# Patient Record
Sex: FEMALE | Race: WHITE | NOT HISPANIC OR LATINO | Employment: OTHER | URBAN - NONMETROPOLITAN AREA
[De-identification: names, ages, dates, MRNs, and addresses within clinical notes are randomized per-mention and may not be internally consistent; named-entity substitution may affect disease eponyms.]

---

## 2023-01-25 PROBLEM — R00.2 HEART PALPITATIONS: Status: ACTIVE | Noted: 2023-01-25

## 2023-01-25 PROBLEM — H25.13 AGE-RELATED NUCLEAR CATARACT, BILATERAL: Status: ACTIVE | Noted: 2023-01-25

## 2023-01-25 PROBLEM — N18.32 STAGE 3B CHRONIC KIDNEY DISEASE (MULTI): Status: ACTIVE | Noted: 2023-01-25

## 2023-01-25 PROBLEM — H90.3 ASYMMETRIC SNHL (SENSORINEURAL HEARING LOSS): Status: ACTIVE | Noted: 2023-01-25

## 2023-01-25 PROBLEM — M54.2 NECK PAIN: Status: ACTIVE | Noted: 2023-01-25

## 2023-01-25 PROBLEM — E27.9 ADRENAL NODULE: Status: ACTIVE | Noted: 2023-01-25

## 2023-01-25 PROBLEM — K92.2 GI BLEEDING: Status: ACTIVE | Noted: 2023-01-25

## 2023-01-25 PROBLEM — I25.84 CORONARY ARTERY CALCIFICATION: Status: ACTIVE | Noted: 2023-01-25

## 2023-01-25 PROBLEM — R06.02 EXERTIONAL SHORTNESS OF BREATH: Status: ACTIVE | Noted: 2023-01-25

## 2023-01-25 PROBLEM — T78.40XA ALLERGIC REACTION: Status: ACTIVE | Noted: 2023-01-25

## 2023-01-25 PROBLEM — M25.522 LEFT ELBOW PAIN: Status: ACTIVE | Noted: 2023-01-25

## 2023-01-25 PROBLEM — M81.0 OSTEOPOROSIS: Status: ACTIVE | Noted: 2023-01-25

## 2023-01-25 PROBLEM — R26.9 GAIT ABNORMALITY: Status: ACTIVE | Noted: 2023-01-25

## 2023-01-25 PROBLEM — G47.33 OBSTRUCTIVE SLEEP APNEA: Status: ACTIVE | Noted: 2023-01-25

## 2023-01-25 PROBLEM — G25.81 RESTLESS LEGS SYNDROME: Status: ACTIVE | Noted: 2023-01-25

## 2023-01-25 PROBLEM — I27.20 PULMONARY HYPERTENSION, MILD (MULTI): Status: ACTIVE | Noted: 2023-01-25

## 2023-01-25 PROBLEM — G89.29 CHRONIC LEFT-SIDED LOW BACK PAIN WITH LEFT-SIDED SCIATICA: Status: ACTIVE | Noted: 2023-01-25

## 2023-01-25 PROBLEM — H43.399 VITREOUS FLOATERS: Status: ACTIVE | Noted: 2023-01-25

## 2023-01-25 PROBLEM — I10 BENIGN ESSENTIAL HYPERTENSION: Status: ACTIVE | Noted: 2023-01-25

## 2023-01-25 PROBLEM — J45.20 MILD INTERMITTENT ASTHMA WITHOUT COMPLICATION (HHS-HCC): Status: ACTIVE | Noted: 2023-01-25

## 2023-01-25 PROBLEM — I26.99 PULMONARY EMBOLISM (MULTI): Status: ACTIVE | Noted: 2023-01-25

## 2023-01-25 PROBLEM — E55.9 VITAMIN D DEFICIENCY: Status: ACTIVE | Noted: 2023-01-25

## 2023-01-25 PROBLEM — E78.5 DYSLIPIDEMIA: Status: ACTIVE | Noted: 2023-01-25

## 2023-01-25 PROBLEM — I25.10 CORONARY ARTERY CALCIFICATION: Status: ACTIVE | Noted: 2023-01-25
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
## 2023-01-25 PROBLEM — R31.0 GROSS HEMATURIA: Status: ACTIVE | Noted: 2023-01-25

## 2023-01-25 PROBLEM — E66.812 CLASS 2 SEVERE OBESITY DUE TO EXCESS CALORIES WITH SERIOUS COMORBIDITY AND BODY MASS INDEX (BMI) OF 39.0 TO 39.9 IN ADULT: Status: ACTIVE | Noted: 2023-01-25

## 2023-01-25 PROBLEM — E66.01 CLASS 2 SEVERE OBESITY DUE TO EXCESS CALORIES WITH SERIOUS COMORBIDITY AND BODY MASS INDEX (BMI) OF 39.0 TO 39.9 IN ADULT (MULTI): Status: ACTIVE | Noted: 2023-01-25

## 2023-01-25 PROBLEM — H91.10 PRESBYACUSIS: Status: ACTIVE | Noted: 2023-01-25

## 2023-01-25 PROBLEM — R53.82 CHRONIC FATIGUE: Status: ACTIVE | Noted: 2023-01-25

## 2023-01-25 PROBLEM — N39.3 STRESS INCONTINENCE IN FEMALE: Status: ACTIVE | Noted: 2023-01-25

## 2023-01-25 PROBLEM — M54.50 ACUTE LOW BACK PAIN: Status: ACTIVE | Noted: 2023-01-25

## 2023-01-25 PROBLEM — H93.13 TINNITUS OF BOTH EARS: Status: ACTIVE | Noted: 2023-01-25

## 2023-01-25 PROBLEM — D12.6 TUBULAR ADENOMA OF COLON: Status: ACTIVE | Noted: 2023-01-25

## 2023-01-25 PROBLEM — F41.8 DEPRESSION WITH ANXIETY: Status: ACTIVE | Noted: 2023-01-25

## 2023-01-25 PROBLEM — B07.0 PLANTAR WARTS: Status: ACTIVE | Noted: 2023-01-25

## 2023-01-25 PROBLEM — L65.9 HAIR LOSS: Status: ACTIVE | Noted: 2023-01-25

## 2023-01-25 PROBLEM — H43.813 POSTERIOR VITREOUS DETACHMENT OF BOTH EYES: Status: ACTIVE | Noted: 2023-01-25

## 2023-01-25 PROBLEM — M54.42 CHRONIC LEFT-SIDED LOW BACK PAIN WITH LEFT-SIDED SCIATICA: Status: ACTIVE | Noted: 2023-01-25

## 2023-01-25 PROBLEM — I20.9 ANGINA PECTORIS (CMS-HCC): Status: ACTIVE | Noted: 2023-01-25

## 2023-01-25 PROBLEM — D64.9 ANEMIA: Status: ACTIVE | Noted: 2023-01-25

## 2023-01-25 PROBLEM — R94.120 ABNORMALLY COMPLIANT LEFT MIDDLE EAR SYSTEM WITH TYPE AD TYMPANOGRAM CURVE: Status: ACTIVE | Noted: 2023-01-25

## 2023-01-25 PROBLEM — I82.409 DVT (DEEP VENOUS THROMBOSIS) (MULTI): Status: ACTIVE | Noted: 2023-01-25

## 2023-01-25 PROBLEM — N28.1 RENAL CYST: Status: ACTIVE | Noted: 2023-01-25

## 2023-01-25 PROBLEM — H04.123 DRY EYES: Status: ACTIVE | Noted: 2023-01-25

## 2023-01-25 PROBLEM — R94.31 ABNORMAL EKG: Status: ACTIVE | Noted: 2023-01-25

## 2023-01-25 PROBLEM — R13.10 DYSPHAGIA: Status: ACTIVE | Noted: 2023-01-25

## 2023-01-25 PROBLEM — F41.9 ANXIETY: Status: ACTIVE | Noted: 2023-01-25

## 2023-01-25 PROBLEM — H91.90 DECREASED HEARING: Status: ACTIVE | Noted: 2023-01-25

## 2023-01-25 PROBLEM — R93.1 AGATSTON CORONARY ARTERY CALCIUM SCORE LESS THAN 100: Status: ACTIVE | Noted: 2023-01-25

## 2023-01-25 PROBLEM — M25.512 ACUTE PAIN OF LEFT SHOULDER: Status: ACTIVE | Noted: 2023-01-25

## 2023-01-25 PROBLEM — I25.10 ATHSCL HEART DISEASE OF NATIVE CORONARY ARTERY W/O ANG PCTRS: Status: ACTIVE | Noted: 2023-01-25

## 2023-01-25 PROBLEM — E27.8 ADRENAL NODULE (MULTI): Status: ACTIVE | Noted: 2023-01-25

## 2023-01-25 RX ORDER — LISINOPRIL 10 MG/1
1 TABLET ORAL DAILY
COMMUNITY
Start: 2015-09-01 | End: 2023-04-17

## 2023-01-25 RX ORDER — BISOPROLOL FUMARATE 5 MG/1
2.5 TABLET, FILM COATED ORAL DAILY
COMMUNITY
Start: 2018-11-26 | End: 2023-09-13 | Stop reason: SDUPTHER

## 2023-01-25 RX ORDER — ATORVASTATIN CALCIUM 40 MG/1
1 TABLET, FILM COATED ORAL DAILY
COMMUNITY
Start: 2016-06-13 | End: 2023-06-05 | Stop reason: SDUPTHER

## 2023-01-25 RX ORDER — NITROGLYCERIN 0.4 MG/1
TABLET SUBLINGUAL
COMMUNITY
Start: 2018-11-26

## 2023-01-25 RX ORDER — DULOXETIN HYDROCHLORIDE 30 MG/1
1 CAPSULE, DELAYED RELEASE ORAL DAILY
COMMUNITY
Start: 2018-07-26 | End: 2023-04-17

## 2023-01-25 RX ORDER — ISOSORBIDE MONONITRATE 30 MG/1
1 TABLET, EXTENDED RELEASE ORAL DAILY
COMMUNITY
Start: 2018-11-26 | End: 2023-09-13 | Stop reason: SDUPTHER

## 2023-03-09 ENCOUNTER — OFFICE VISIT (OUTPATIENT)
Dept: PRIMARY CARE | Facility: CLINIC | Age: 73
End: 2023-03-09
Payer: MEDICARE

## 2023-03-09 VITALS
SYSTOLIC BLOOD PRESSURE: 134 MMHG | HEART RATE: 86 BPM | RESPIRATION RATE: 22 BRPM | BODY MASS INDEX: 39.79 KG/M2 | OXYGEN SATURATION: 98 % | WEIGHT: 216.2 LBS | HEIGHT: 62 IN | DIASTOLIC BLOOD PRESSURE: 78 MMHG

## 2023-03-09 DIAGNOSIS — I25.84 CORONARY ARTERY CALCIFICATION: ICD-10-CM

## 2023-03-09 DIAGNOSIS — L65.9 HAIR LOSS: ICD-10-CM

## 2023-03-09 DIAGNOSIS — I20.9 ANGINA PECTORIS (CMS-HCC): ICD-10-CM

## 2023-03-09 DIAGNOSIS — E27.8 ADRENAL NODULE (MULTI): ICD-10-CM

## 2023-03-09 DIAGNOSIS — N18.32 STAGE 3B CHRONIC KIDNEY DISEASE (MULTI): ICD-10-CM

## 2023-03-09 DIAGNOSIS — I25.10 CORONARY ARTERY CALCIFICATION: ICD-10-CM

## 2023-03-09 DIAGNOSIS — E66.01 CLASS 2 SEVERE OBESITY DUE TO EXCESS CALORIES WITH SERIOUS COMORBIDITY AND BODY MASS INDEX (BMI) OF 39.0 TO 39.9 IN ADULT (MULTI): ICD-10-CM

## 2023-03-09 DIAGNOSIS — I26.93 SINGLE SUBSEGMENTAL PULMONARY EMBOLISM WITHOUT ACUTE COR PULMONALE (MULTI): Primary | ICD-10-CM

## 2023-03-09 DIAGNOSIS — H93.13 TINNITUS OF BOTH EARS: ICD-10-CM

## 2023-03-09 DIAGNOSIS — F41.9 ANXIETY: ICD-10-CM

## 2023-03-09 DIAGNOSIS — R53.83 OTHER FATIGUE: ICD-10-CM

## 2023-03-09 DIAGNOSIS — I25.10 ATHSCL HEART DISEASE OF NATIVE CORONARY ARTERY W/O ANG PCTRS: ICD-10-CM

## 2023-03-09 DIAGNOSIS — E55.9 VITAMIN D DEFICIENCY: ICD-10-CM

## 2023-03-09 DIAGNOSIS — E78.5 DYSLIPIDEMIA: ICD-10-CM

## 2023-03-09 DIAGNOSIS — I10 BENIGN ESSENTIAL HYPERTENSION: ICD-10-CM

## 2023-03-09 DIAGNOSIS — M81.0 AGE-RELATED OSTEOPOROSIS WITHOUT CURRENT PATHOLOGICAL FRACTURE: ICD-10-CM

## 2023-03-09 DIAGNOSIS — K58.8 OTHER IRRITABLE BOWEL SYNDROME: ICD-10-CM

## 2023-03-09 PROBLEM — I27.20 PULMONARY HYPERTENSION, MILD (MULTI): Status: RESOLVED | Noted: 2023-01-25 | Resolved: 2023-03-09

## 2023-03-09 PROCEDURE — 1160F RVW MEDS BY RX/DR IN RCRD: CPT | Performed by: FAMILY MEDICINE

## 2023-03-09 PROCEDURE — 3075F SYST BP GE 130 - 139MM HG: CPT | Performed by: FAMILY MEDICINE

## 2023-03-09 PROCEDURE — 3078F DIAST BP <80 MM HG: CPT | Performed by: FAMILY MEDICINE

## 2023-03-09 PROCEDURE — 3008F BODY MASS INDEX DOCD: CPT | Performed by: FAMILY MEDICINE

## 2023-03-09 PROCEDURE — 1159F MED LIST DOCD IN RCRD: CPT | Performed by: FAMILY MEDICINE

## 2023-03-09 PROCEDURE — 1036F TOBACCO NON-USER: CPT | Performed by: FAMILY MEDICINE

## 2023-03-09 PROCEDURE — 99214 OFFICE O/P EST MOD 30 MIN: CPT | Performed by: FAMILY MEDICINE

## 2023-03-09 RX ORDER — CALCIUM CARBONATE/VITAMIN D3 250-3.125
1 TABLET ORAL
COMMUNITY
End: 2023-09-13 | Stop reason: SDUPTHER

## 2023-03-09 RX ORDER — SEMAGLUTIDE 1.34 MG/ML
0.25 INJECTION, SOLUTION SUBCUTANEOUS
Qty: 1 EACH | Refills: 0 | Status: SHIPPED | OUTPATIENT
Start: 2023-03-09 | End: 2023-03-09

## 2023-03-09 RX ORDER — SEMAGLUTIDE 1.34 MG/ML
0.25 INJECTION, SOLUTION SUBCUTANEOUS
Qty: 1 EACH | Refills: 0 | Status: SHIPPED | OUTPATIENT
Start: 2023-03-09 | End: 2023-04-27

## 2023-03-09 ASSESSMENT — PATIENT HEALTH QUESTIONNAIRE - PHQ9
SUM OF ALL RESPONSES TO PHQ9 QUESTIONS 1 AND 2: 0
2. FEELING DOWN, DEPRESSED OR HOPELESS: NOT AT ALL
1. LITTLE INTEREST OR PLEASURE IN DOING THINGS: NOT AT ALL

## 2023-03-09 ASSESSMENT — ENCOUNTER SYMPTOMS
OCCASIONAL FEELINGS OF UNSTEADINESS: 0
DEPRESSION: 0
LOSS OF SENSATION IN FEET: 0

## 2023-03-09 ASSESSMENT — COLUMBIA-SUICIDE SEVERITY RATING SCALE - C-SSRS
2. HAVE YOU ACTUALLY HAD ANY THOUGHTS OF KILLING YOURSELF?: NO
6. HAVE YOU EVER DONE ANYTHING, STARTED TO DO ANYTHING, OR PREPARED TO DO ANYTHING TO END YOUR LIFE?: NO

## 2023-03-09 ASSESSMENT — PAIN SCALES - GENERAL: PAINLEVEL: 0-NO PAIN

## 2023-03-09 NOTE — PROGRESS NOTES
Subjective     Soni Cleveland is a 72 y.o. female who presents for Follow-up (3 month follow up meds).      HPI  The patient is a 72 year-old female presenting to the clinic for a 3 month follow up .  Has history of pulmonary embolism.  Has been on Eliquis.  Educated on dyslipidemia and diet and exercise.  Educated on hypertension low-salt and exercise.  Complaining hair loss.  Advised to keep appointment with the dermatologist.  Has been to the dermatologist and stated that her loss is improving.  History of anxiety.  Denies depression.  Denies suicidal.  Denies homicidal.  Diet exercise.  Advised to lose weight.  Educated on coronary artery disease.  Advised to call 911 if develops chest pain and/or heart palpitations and/or shortness of breath.  History of adrenal nodule.  Advised to keep appointment with specialist.  And exercise.  Continue current management of angina.  No chest pain.  Feeling tired.  Advised on diet and exercise.  Educated on vitamin D deficiency.  Educated on irritable bowel syndrome.  Advised to increase dietary fiber intake.  Complaining of ringing in both ears.  Recommended referral to the ENT.  Review of Systems     General.  Denies fatigue.  Denies fever.  Denies weight loss.  Denies weight gain.    HEENT dictated as above respiratory.  Denies cough.  Denies shortness of breath.    Cardiovascular.  Denies chest pain.  Denies heart palpitations.  Denies shortness of breath.    Gastrointestinal.  Denies nausea vomiting diarrhea.  Denies abdominal pain.    Genitourinary denies burning urination.  Denies frequent urination.  Denies flank pain.  Denies blood in the urine.  Denies abnormal vaginal discharge.    Neurology.  Denies tingling numbness but denies weakness.  Denies headache.  Denies blurred vision.    Musculoskeletal.  Denies body aches.  Denies joint pains.  Denies muscle aches.  Denies muscle weakness    Endocrinology.  Denies cold intolerance.  Denies hot  "intolerance.      Objective   /78 (BP Location: Left arm, Patient Position: Sitting, BP Cuff Size: Large adult)   Pulse 86   Resp 22   Ht 1.575 m (5' 2\")   Wt 98.1 kg (216 lb 3.2 oz)   SpO2 98%   BMI 39.54 kg/m²      Physical Exam  General.  Not in distress.  HEENT normocephalic anicteric sclerae.  Neck soft supple no thyromegaly.  No carotid bruit.  Lungs are clear.  Heart regular.  Abdomen soft nontender nondistended bowel sounds are positive.  Extremities no clubbing cyanosis or edema.  Psychiatric.  Has good eye contact.  No crying spells noted.  Speech was normal.  Denies depression.  Denies suicidal.  Denies homicidal.     Assessment/Plan     1.  Pulm embolism.  Dictated as above.    2.  Dyslipidemia.  Educated on diet exercise.  Advised to lose weight.    3.  Hair loss.  Dictated as above    4.  Anxiety.  Counseled for anxiety.  Patient.  Denies suicidal.  Denies homicidal.  Continue current management.  5.  Obesity.  Educated on diet and exercise.  Explained adverse effects of medication.  We will continue monitor weight loss  6.  Hypertension.  Dictated as above.    7.  Coronary artery disease.  Dictated as above.    8.  Chronic kidney disease.  Dictated as above.    9.  Osteoporosis.  Dictated as above.    10.  Fatigue.  Dictated as above.    11.  Vitamin D deficiency.  Dictated as above.    12.  Irritable bowel syndrome.  Dictated as above.    13.  Tinnitus of both ears.  Dictated as above                              Problem List Items Addressed This Visit          Circulatory    Angina pectoris (CMS/HCC)    Athscl heart disease of native coronary artery w/o ang pctrs    Benign essential hypertension    Relevant Orders    Comprehensive metabolic panel    Urinalysis with Reflex Microscopic    Albumin , Urine Random    Coronary artery calcification    Pulmonary embolism (CMS/HCC) - Primary       Genitourinary    Stage 3b chronic kidney disease       Musculoskeletal    Osteoporosis    Relevant " Orders    XR DEXA bone density       Endocrine/Metabolic    Class 2 severe obesity due to excess calories with serious comorbidity and body mass index (BMI) of 39.0 to 39.9 in adult (CMS/[facility])    Relevant Medications    semaglutide (Ozempic) 0.25 mg or 0.5 mg(2 mg/1.5 mL) pen injector    Vitamin D deficiency    Relevant Orders    Vitamin D 25-Hydroxy,Total       Other    Adrenal nodule (CMS/HCC)    Anxiety    Dyslipidemia    Relevant Orders    Lipid panel    Tinnitus of both ears    Relevant Orders    Referral to ENT    Hair loss     Other Visit Diagnoses       Other fatigue        Relevant Orders    CBC and Auto Differential    Tsh With Reflex To Free T4 If Abnormal    Vitamin B12    Folate    Other irritable bowel syndrome            Scribe Attestation  By signing my name below, ICourtney Scribe   attest that this documentation has been prepared under the direction and in the presence of Huy John MD.

## 2023-03-18 ENCOUNTER — LAB (OUTPATIENT)
Dept: LAB | Facility: LAB | Age: 73
End: 2023-03-18
Payer: MEDICARE

## 2023-03-18 DIAGNOSIS — E55.9 VITAMIN D DEFICIENCY: ICD-10-CM

## 2023-03-18 DIAGNOSIS — R53.83 OTHER FATIGUE: ICD-10-CM

## 2023-03-18 DIAGNOSIS — I10 BENIGN ESSENTIAL HYPERTENSION: ICD-10-CM

## 2023-03-18 DIAGNOSIS — E78.5 DYSLIPIDEMIA: ICD-10-CM

## 2023-03-18 LAB
ALANINE AMINOTRANSFERASE (SGPT) (U/L) IN SER/PLAS: 15 U/L (ref 7–45)
ALBUMIN (G/DL) IN SER/PLAS: 4 G/DL (ref 3.4–5)
ALKALINE PHOSPHATASE (U/L) IN SER/PLAS: 74 U/L (ref 33–136)
ANION GAP IN SER/PLAS: 13 MMOL/L (ref 10–20)
APPEARANCE, URINE: ABNORMAL
ASPARTATE AMINOTRANSFERASE (SGOT) (U/L) IN SER/PLAS: 16 U/L (ref 9–39)
BACTERIA, URINE: ABNORMAL /HPF
BASOPHILS (10*3/UL) IN BLOOD BY AUTOMATED COUNT: 0.04 X10E9/L (ref 0–0.1)
BASOPHILS/100 LEUKOCYTES IN BLOOD BY AUTOMATED COUNT: 0.4 % (ref 0–2)
BILIRUBIN TOTAL (MG/DL) IN SER/PLAS: 0.6 MG/DL (ref 0–1.2)
BILIRUBIN, URINE: NEGATIVE
BLOOD, URINE: NEGATIVE
CALCIUM (MG/DL) IN SER/PLAS: 9.8 MG/DL (ref 8.6–10.3)
CARBON DIOXIDE, TOTAL (MMOL/L) IN SER/PLAS: 28 MMOL/L (ref 21–32)
CHLORIDE (MMOL/L) IN SER/PLAS: 106 MMOL/L (ref 98–107)
CHOLESTEROL (MG/DL) IN SER/PLAS: 170 MG/DL (ref 0–199)
CHOLESTEROL IN HDL (MG/DL) IN SER/PLAS: 57.4 MG/DL
CHOLESTEROL/HDL RATIO: 3
COLOR, URINE: YELLOW
CREATININE (MG/DL) IN SER/PLAS: 1.27 MG/DL (ref 0.5–1.05)
EOSINOPHILS (10*3/UL) IN BLOOD BY AUTOMATED COUNT: 0.18 X10E9/L (ref 0–0.7)
EOSINOPHILS/100 LEUKOCYTES IN BLOOD BY AUTOMATED COUNT: 2 % (ref 0–6)
ERYTHROCYTE DISTRIBUTION WIDTH (RATIO) BY AUTOMATED COUNT: 14.6 % (ref 11.5–14.5)
ERYTHROCYTE MEAN CORPUSCULAR HEMOGLOBIN CONCENTRATION (G/DL) BY AUTOMATED: 31.8 G/DL (ref 32–36)
ERYTHROCYTE MEAN CORPUSCULAR VOLUME (FL) BY AUTOMATED COUNT: 90 FL (ref 80–100)
ERYTHROCYTES (10*6/UL) IN BLOOD BY AUTOMATED COUNT: 4.72 X10E12/L (ref 4–5.2)
GFR FEMALE: 45 ML/MIN/1.73M2
GLUCOSE (MG/DL) IN SER/PLAS: 93 MG/DL (ref 74–99)
GLUCOSE, URINE: NEGATIVE MG/DL
HEMATOCRIT (%) IN BLOOD BY AUTOMATED COUNT: 42.4 % (ref 36–46)
HEMOGLOBIN (G/DL) IN BLOOD: 13.5 G/DL (ref 12–16)
HYALINE CASTS, URINE: ABNORMAL /LPF
IMMATURE GRANULOCYTES (10*3/UL) ABSOLUTE: 0.02 X10E9/L (ref 0–0.7)
IMMATURE GRANULOCYTES/100 LEUKOCYTES IN BLOOD BY AUTOMATED COUNT: 0.2 % (ref 0–0.9)
KETONES, URINE: NEGATIVE MG/DL
LDL: 76 MG/DL (ref 0–99)
LEUKOCYTE ESTERASE, URINE: ABNORMAL
LEUKOCYTES (10*3/UL) IN BLOOD BY AUTOMATED COUNT: 9.2 X10E9/L (ref 4.4–11.3)
LYMPHOCYTES (10*3/UL) IN BLOOD BY AUTOMATED COUNT: 2.08 X10E9/L (ref 1.2–4.8)
LYMPHOCYTES/100 LEUKOCYTES IN BLOOD BY AUTOMATED COUNT: 22.7 % (ref 13–44)
MONOCYTES (10*3/UL) IN BLOOD BY AUTOMATED COUNT: 0.82 X10E9/L (ref 0.1–1)
MONOCYTES/100 LEUKOCYTES IN BLOOD BY AUTOMATED COUNT: 9 % (ref 2–10)
MUCUS, URINE: ABNORMAL /LPF
NEUTROPHILS (10*3/UL) IN BLOOD BY AUTOMATED COUNT: 6.01 X10E9/L (ref 1.2–7.7)
NEUTROPHILS/100 LEUKOCYTES IN BLOOD BY AUTOMATED COUNT: 65.7 % (ref 40–80)
NITRITE, URINE: NEGATIVE
NRBC (PER 100 WBCS) BY AUTOMATED COUNT: 0 /100 WBC (ref 0–0)
PH, URINE: 5 (ref 5–8)
PLATELETS (10*3/UL) IN BLOOD AUTOMATED COUNT: 240 X10E9/L (ref 150–450)
POTASSIUM (MMOL/L) IN SER/PLAS: 4.4 MMOL/L (ref 3.5–5.3)
PROTEIN TOTAL: 6.6 G/DL (ref 6.4–8.2)
PROTEIN, URINE: NEGATIVE MG/DL
RBC, URINE: 2 /HPF (ref 0–5)
SODIUM (MMOL/L) IN SER/PLAS: 143 MMOL/L (ref 136–145)
SPECIFIC GRAVITY, URINE: 1.02 (ref 1–1.03)
SQUAMOUS EPITHELIAL CELLS, URINE: 11 /HPF
THYROTROPIN (MIU/L) IN SER/PLAS BY DETECTION LIMIT <= 0.05 MIU/L: 1.8 MIU/L (ref 0.44–3.98)
TRIGLYCERIDE (MG/DL) IN SER/PLAS: 181 MG/DL (ref 0–149)
UREA NITROGEN (MG/DL) IN SER/PLAS: 23 MG/DL (ref 6–23)
UROBILINOGEN, URINE: <2 MG/DL (ref 0–1.9)
VLDL: 36 MG/DL (ref 0–40)
WBC, URINE: 28 /HPF (ref 0–5)

## 2023-03-18 PROCEDURE — 82570 ASSAY OF URINE CREATININE: CPT

## 2023-03-18 PROCEDURE — 80053 COMPREHEN METABOLIC PANEL: CPT

## 2023-03-18 PROCEDURE — 36415 COLL VENOUS BLD VENIPUNCTURE: CPT

## 2023-03-18 PROCEDURE — 82306 VITAMIN D 25 HYDROXY: CPT

## 2023-03-18 PROCEDURE — 82746 ASSAY OF FOLIC ACID SERUM: CPT

## 2023-03-18 PROCEDURE — 82043 UR ALBUMIN QUANTITATIVE: CPT

## 2023-03-18 PROCEDURE — 81001 URINALYSIS AUTO W/SCOPE: CPT

## 2023-03-18 PROCEDURE — 84443 ASSAY THYROID STIM HORMONE: CPT

## 2023-03-18 PROCEDURE — 85025 COMPLETE CBC W/AUTO DIFF WBC: CPT

## 2023-03-18 PROCEDURE — 82607 VITAMIN B-12: CPT

## 2023-03-18 PROCEDURE — 80061 LIPID PANEL: CPT

## 2023-03-19 LAB
ALBUMIN (MG/L) IN URINE: 15.4 MG/L
ALBUMIN/CREATININE (UG/MG) IN URINE: 9.8 UG/MG CRT (ref 0–30)
CALCIDIOL (25 OH VITAMIN D3) (NG/ML) IN SER/PLAS: 69 NG/ML
COBALAMIN (VITAMIN B12) (PG/ML) IN SER/PLAS: 680 PG/ML (ref 211–911)
CREATININE (MG/DL) IN URINE: 157 MG/DL (ref 20–320)
FOLATE (NG/ML) IN SER/PLAS: >24 NG/ML

## 2023-03-23 LAB
ANTICARDIOLIPIN IGA ANTIBODY: 0.9 APL U/ML (ref 0–20)
ANTICARDIOLIPIN IGG ANTIBODY: <1.6 GPL U/ML (ref 0–20)
ANTICARDIOLIPIN IGM ANTIBODY: 1.8 MPL U/ML (ref 0–20)
BETA 2 GLYCOPROTEIN 1 IGA AB IN SERUM: 1.3 U/ML (ref 0–20)
BETA 2 GLYCOPROTEIN 1 IGG AB IN SERUM: <1.4 U/ML (ref 0–20)
BETA 2 GLYCOPROTEIN 1 IGM ANTIBODY IN SERUM: 2.6 U/ML (ref 0–20)
COMPLEMENT C3 (MG/DL) IN SER/PLAS: 161 MG/DL (ref 87–200)
COMPLEMENT C4 (MG/DL) IN SER/PLAS: 44 MG/DL (ref 10–50)
RHEUMATOID FACTOR (IU/ML) IN SERUM OR PLASMA: <10 IU/ML (ref 0–15)
THYROPEROXIDASE AB (IU/ML) IN SER/PLAS: <28 IU/ML

## 2023-03-25 LAB — THYROGLOBULIN AB (IU/ML) IN SER/PLAS: <0.9 IU/ML (ref 0–4)

## 2023-04-10 ENCOUNTER — OFFICE VISIT (OUTPATIENT)
Dept: PRIMARY CARE | Facility: CLINIC | Age: 73
End: 2023-04-10
Payer: MEDICARE

## 2023-04-10 VITALS
WEIGHT: 214.6 LBS | HEIGHT: 62 IN | OXYGEN SATURATION: 96 % | DIASTOLIC BLOOD PRESSURE: 78 MMHG | BODY MASS INDEX: 39.49 KG/M2 | HEART RATE: 84 BPM | SYSTOLIC BLOOD PRESSURE: 134 MMHG

## 2023-04-10 DIAGNOSIS — R53.82 CHRONIC FATIGUE: ICD-10-CM

## 2023-04-10 DIAGNOSIS — R41.3 MEMORY PROBLEM: ICD-10-CM

## 2023-04-10 DIAGNOSIS — M85.89 OSTEOPENIA OF MULTIPLE SITES: ICD-10-CM

## 2023-04-10 DIAGNOSIS — E78.5 DYSLIPIDEMIA: Primary | ICD-10-CM

## 2023-04-10 DIAGNOSIS — N18.32 STAGE 3B CHRONIC KIDNEY DISEASE (MULTI): ICD-10-CM

## 2023-04-10 DIAGNOSIS — I27.82 OTHER CHRONIC PULMONARY EMBOLISM WITHOUT ACUTE COR PULMONALE (MULTI): ICD-10-CM

## 2023-04-10 DIAGNOSIS — E55.9 VITAMIN D DEFICIENCY: ICD-10-CM

## 2023-04-10 DIAGNOSIS — H93.13 TINNITUS OF BOTH EARS: ICD-10-CM

## 2023-04-10 DIAGNOSIS — E66.01 CLASS 2 SEVERE OBESITY DUE TO EXCESS CALORIES WITH SERIOUS COMORBIDITY AND BODY MASS INDEX (BMI) OF 39.0 TO 39.9 IN ADULT (MULTI): ICD-10-CM

## 2023-04-10 PROBLEM — I82.509 CHRONIC DEEP VEIN THROMBOSIS (DVT) OF LOWER EXTREMITY (MULTI): Status: ACTIVE | Noted: 2023-01-25

## 2023-04-10 PROCEDURE — 3008F BODY MASS INDEX DOCD: CPT | Performed by: FAMILY MEDICINE

## 2023-04-10 PROCEDURE — 1036F TOBACCO NON-USER: CPT | Performed by: FAMILY MEDICINE

## 2023-04-10 PROCEDURE — 3078F DIAST BP <80 MM HG: CPT | Performed by: FAMILY MEDICINE

## 2023-04-10 PROCEDURE — 99213 OFFICE O/P EST LOW 20 MIN: CPT | Performed by: FAMILY MEDICINE

## 2023-04-10 PROCEDURE — 3075F SYST BP GE 130 - 139MM HG: CPT | Performed by: FAMILY MEDICINE

## 2023-04-10 PROCEDURE — 1160F RVW MEDS BY RX/DR IN RCRD: CPT | Performed by: FAMILY MEDICINE

## 2023-04-10 PROCEDURE — 1159F MED LIST DOCD IN RCRD: CPT | Performed by: FAMILY MEDICINE

## 2023-04-10 ASSESSMENT — PAIN SCALES - GENERAL: PAINLEVEL: 0-NO PAIN

## 2023-04-10 NOTE — PROGRESS NOTES
Subjective     Soni Cleveland is a 72 y.o. female who presents for Follow-up (1 month follow up meds).      HPI  The patient is a 72 year-old female presenting to the clinic for a 1 month follow up appointment.  Discussed diet and exercise.  Patient sees hematology.   She reports memory problems.   Complaint difficulty with memory including forgetfulness.  Referral placed with Neurology.  Educated on obesity and diet and exercise.  Advised to lose weight.  Educated on vitamin D deficiency.  We will continue monitor.  Educated on dyslipidemia and diet exercise.  Educated on osteopenia and muscles and exercise.  Complaining feeling tired.  Advised on diet and exercise.  Patient stated that she had history of pulm embolism when she was treated in the past.  Denies shortness of breath but denies chest pain.  Denies heart palpitations.    Educated on chronic kidney disease.  We will continue monitor.  Complaining of ringing in both ears.    Patient is due for Colonoscopy 2024.   Follow up in 4 weeks.      Review of Systems  Review of systems    General.  Denies fever.  Denies chills.    HEENT denies nasal congestion.  Denies sinus pressure.  Complain ringing in both ears    Respiratory.  Denies cough.  Denies shortness of breath.    Cardiovascular.  Denies chest pain.  Denies heart palpitations.  Denies shortness of breath.    Gastrointestinal.  Denies nausea vomiting diarrhea.  Denies abdominal pain.    Genitourinary denies burning urination.  Denies frequent urination.  Denies flank pain.  Denies blood in the urine.  Denies abnormal vaginal discharge.    Neurology.  Denies tingling numbness but denies weakness.  Denies headache.  Denies blurred vision.    Musculoskeletal.  Denies body aches.  Denies joint pains.  Denies muscle aches.  Denies muscle weakness    Endocrinology.  Dictated as above.      Psychiatric.  Denies depression.  Denies anxiety.  Denies suicidal.  Denies homicidal.      Objective   /78 (BP  "Location: Right arm, Patient Position: Sitting, BP Cuff Size: Large adult)   Pulse 84   Ht 1.575 m (5' 2\")   Wt 97.3 kg (214 lb 9.6 oz)   SpO2 96%   BMI 39.25 kg/m²        Physical Exam  General.  Not in distress.  HEENT normocephalic anicteric sclerae.  Neck soft supple no thyromegaly.  No carotid bruit.  Lungs are clear.  Heart regular.  Abdomen soft nontender nondistended bowel sounds are positive.  Extremities no clubbing cyanosis or edema.  Psychiatric.  Has good eye contact.  No crying spells noted.  Speech was normal.  Denies depression.  Denies suicidal.  Denies homicidal.      Assessment/Plan   1.  Dyslipidemia.  Educated on diet exercise.  We will continue monitor.    2.  Obesity.  Educated on diet exercise and advised to lose weight.  Start taking the Ozempic.    3.  Vitamin D deficiency but educated on vitamin D deficiency.  We will continue monitor    4.  Osteopenia.  Educated on osteopenia and muscles after exercise.  Recommended calcium and vitamin D.  Recommended muscle strength and exercise.    5.  Fatigue.  Educated on diet exercise.  We will continue monitor.    6.  Pulmonary embolism.  Dictated as above.    7.  Memory problem.  Dictated as above  8.  Tinnitus of both ears.  Dictated as above                                      Problem List Items Addressed This Visit          Circulatory    Pulmonary embolism (CMS/[facility])       Genitourinary    Stage 3b chronic kidney disease       Endocrine/Metabolic    Class 2 severe obesity due to excess calories with serious comorbidity and body mass index (BMI) of 39.0 to 39.9 in adult (CMS/[facility])    Vitamin D deficiency       Other    Chronic fatigue    Dyslipidemia - Primary     Other Visit Diagnoses       Osteopenia of multiple sites        Memory problem        Relevant Orders    Referral to Neurology        Scribe Attestation  By signing my name below, Courtney OBREGON Scribe   attest that this documentation has been prepared under the direction and in " the presence of Huy John MD.

## 2023-05-08 ENCOUNTER — OFFICE VISIT (OUTPATIENT)
Dept: PRIMARY CARE | Facility: CLINIC | Age: 73
End: 2023-05-08
Payer: MEDICARE

## 2023-05-08 VITALS
DIASTOLIC BLOOD PRESSURE: 82 MMHG | WEIGHT: 214.6 LBS | OXYGEN SATURATION: 96 % | SYSTOLIC BLOOD PRESSURE: 138 MMHG | HEART RATE: 84 BPM | HEIGHT: 62 IN | BODY MASS INDEX: 39.49 KG/M2

## 2023-05-08 DIAGNOSIS — I25.10 ATHSCL HEART DISEASE OF NATIVE CORONARY ARTERY W/O ANG PCTRS: ICD-10-CM

## 2023-05-08 DIAGNOSIS — E78.5 DYSLIPIDEMIA: Primary | ICD-10-CM

## 2023-05-08 DIAGNOSIS — F40.243 FLYING PHOBIA: ICD-10-CM

## 2023-05-08 DIAGNOSIS — E88.810 DYSMETABOLIC SYNDROME: ICD-10-CM

## 2023-05-08 DIAGNOSIS — E66.01 CLASS 2 SEVERE OBESITY DUE TO EXCESS CALORIES WITH SERIOUS COMORBIDITY AND BODY MASS INDEX (BMI) OF 39.0 TO 39.9 IN ADULT (MULTI): ICD-10-CM

## 2023-05-08 DIAGNOSIS — M81.0 AGE-RELATED OSTEOPOROSIS WITHOUT CURRENT PATHOLOGICAL FRACTURE: ICD-10-CM

## 2023-05-08 DIAGNOSIS — R53.82 CHRONIC FATIGUE: ICD-10-CM

## 2023-05-08 DIAGNOSIS — I10 PRIMARY HYPERTENSION: ICD-10-CM

## 2023-05-08 PROCEDURE — 1159F MED LIST DOCD IN RCRD: CPT | Performed by: FAMILY MEDICINE

## 2023-05-08 PROCEDURE — 3075F SYST BP GE 130 - 139MM HG: CPT | Performed by: FAMILY MEDICINE

## 2023-05-08 PROCEDURE — 3008F BODY MASS INDEX DOCD: CPT | Performed by: FAMILY MEDICINE

## 2023-05-08 PROCEDURE — 1036F TOBACCO NON-USER: CPT | Performed by: FAMILY MEDICINE

## 2023-05-08 PROCEDURE — 3079F DIAST BP 80-89 MM HG: CPT | Performed by: FAMILY MEDICINE

## 2023-05-08 PROCEDURE — 99214 OFFICE O/P EST MOD 30 MIN: CPT | Performed by: FAMILY MEDICINE

## 2023-05-08 PROCEDURE — 1160F RVW MEDS BY RX/DR IN RCRD: CPT | Performed by: FAMILY MEDICINE

## 2023-05-08 RX ORDER — HYDROXYZINE HYDROCHLORIDE 25 MG/1
TABLET, FILM COATED ORAL
Qty: 10 TABLET | Refills: 0 | Status: SHIPPED | OUTPATIENT
Start: 2023-05-08 | End: 2023-08-22 | Stop reason: WASHOUT

## 2023-05-08 ASSESSMENT — PAIN SCALES - GENERAL: PAINLEVEL: 0-NO PAIN

## 2023-05-08 NOTE — PROGRESS NOTES
"Subjective     Soni Cleveland is a 72 y.o. female who presents for No chief complaint on file..       HPI  The patient is a 72 year-old female presenting to the clinic for a 4 week follow up appointment.  Follow-up.  Educated on dyslipidemia and exercise.  Complaining feeling tired but advised on diet exercise.  Educated on obesity and diet and exercise.  Advised to lose weight.  Educated on dysmetabolic syndrome and diet and exercise.  Advised to lose weight.  Educated on hypertension and low-salt and exercise.  Has history of flying phobia.  Needs medication for phobia.    Educated on atherosclerotic heart disease.  Educated on osteoporosis and muscle strength and exercise.  Continue Prolia.    She goes to cardiologist                  Review of Systems  Review of systems    General.  Denies fever.  Denies chills.    HEENT denies nasal congestion.  Denies sinus pressure.    Respiratory.  Denies cough.  Denies shortness of breath.    Cardiovascular.  Denies chest pain.  Denies heart palpitations.  Denies shortness of breath.    Gastrointestinal.  Denies nausea vomiting diarrhea.  Denies abdominal pain.    Genitourinary denies burning urination.  Denies frequent urination.  Denies flank pain.  Denies blood in the urine.  Denies abnormal vaginal discharge.    Neurology.  Denies tingling numbness but denies weakness.  Denies headache.  Denies blurred vision.    Musculoskeletal.  Denies body aches.  Denies joint pains.  Denies muscle aches.  Denies muscle weakness    Endocrinology.  Denies cold intolerance.  Denies hot intolerance.    Psychiatric.  Denies depression.  Denies anxiety.  Denies suicidal.  Denies homicidal.      Objective       5/8/2023 1:35 PM   /82   BP Location Left arm   Patient Position Sitting   BP Cuff Size Large adult   Pulse 84   SpO2 96 %   Weight 97.3 kg (214 lb 9.6 oz)   Height 1.575 m (5' 2\")   Pain Score 0-No pain    Other Vitals   BMI 39.25 kg/m2   BSA 2.06 m2   Menstrual status " Postmenopausal   OB/Gyn status reviewed 5/8/2023   Tobacco   Smoking status Never   Smokeless status Never   Reviewed 5/8/2023          Physical Exam  General.  Not in distress.  HEENT normocephalic anicteric sclerae.  Neck soft supple no thyromegaly.  No carotid bruit.  Lungs are clear.  Heart regular.  Abdomen soft nontender nondistended bowel sounds are positive.  Extremities no clubbing cyanosis or edema.  Psychiatric.  Has good eye contact.  No crying spells noted.  Speech was normal.  Denies depression.  Denies suicidal.  Denies homicidal.    Assessment/Plan     1.  Dyslipidemia.  Educated on diet exercise.  Advised to lose weight we will continue monitor.    2 chronic fatigue.  Educated on diet exercise.  Advised to lose weight    3.  Obesity.  Educated on diet exercise.  We will continue monitor    4.  Dysmetabolic syndrome.  Dictated as above    5.  Hypertension.  Educated on low-salt and exercise.  Continue current management.  6.  Flying phobia.  Counseled for flying phobia.  Started on medication.  Explained adverse effects of medication.  7.  Atherosclerotic heart disease.  Denies chest pain.  Denies heart palpitations.  Denies shortness of breath.  Keep appointment with the cardiologist.    8.  Osteoporosis.  Educated on muscle center exercise.  Continue current management.                                        Problem List Items Addressed This Visit    None  Scribe Attestation  By signing my name below, Courtney OBREGON Scribe   attest that this documentation has been prepared under the direction and in the presence of Huy John MD.

## 2023-06-05 DIAGNOSIS — E78.5 DYSLIPIDEMIA: Primary | ICD-10-CM

## 2023-06-05 RX ORDER — ATORVASTATIN CALCIUM 40 MG/1
40 TABLET, FILM COATED ORAL DAILY
Qty: 90 TABLET | Refills: 0 | Status: SHIPPED | OUTPATIENT
Start: 2023-06-05 | End: 2023-08-25 | Stop reason: SDUPTHER

## 2023-06-12 ENCOUNTER — TELEPHONE (OUTPATIENT)
Dept: PRIMARY CARE | Facility: CLINIC | Age: 73
End: 2023-06-12
Payer: MEDICARE

## 2023-06-12 DIAGNOSIS — M81.0 AGE-RELATED OSTEOPOROSIS WITHOUT CURRENT PATHOLOGICAL FRACTURE: Primary | ICD-10-CM

## 2023-06-30 LAB
ALANINE AMINOTRANSFERASE (SGPT) (U/L) IN SER/PLAS: 20 U/L (ref 7–45)
ALBUMIN (G/DL) IN SER/PLAS: 4.1 G/DL (ref 3.4–5)
ALKALINE PHOSPHATASE (U/L) IN SER/PLAS: 72 U/L (ref 33–136)
ANION GAP IN SER/PLAS: 12 MMOL/L (ref 10–20)
ASPARTATE AMINOTRANSFERASE (SGOT) (U/L) IN SER/PLAS: 19 U/L (ref 9–39)
BILIRUBIN TOTAL (MG/DL) IN SER/PLAS: 0.5 MG/DL (ref 0–1.2)
CALCIUM (MG/DL) IN SER/PLAS: 9.6 MG/DL (ref 8.6–10.3)
CARBON DIOXIDE, TOTAL (MMOL/L) IN SER/PLAS: 28 MMOL/L (ref 21–32)
CHLORIDE (MMOL/L) IN SER/PLAS: 105 MMOL/L (ref 98–107)
CREATININE (MG/DL) IN SER/PLAS: 1.27 MG/DL (ref 0.5–1.05)
GFR FEMALE: 45 ML/MIN/1.73M2
GLUCOSE (MG/DL) IN SER/PLAS: 153 MG/DL (ref 74–99)
POTASSIUM (MMOL/L) IN SER/PLAS: 4.3 MMOL/L (ref 3.5–5.3)
PROTEIN TOTAL: 6.9 G/DL (ref 6.4–8.2)
SODIUM (MMOL/L) IN SER/PLAS: 141 MMOL/L (ref 136–145)
UREA NITROGEN (MG/DL) IN SER/PLAS: 19 MG/DL (ref 6–23)

## 2023-08-21 PROBLEM — R31.0 GROSS HEMATURIA: Status: RESOLVED | Noted: 2023-01-25 | Resolved: 2023-08-21
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
## 2023-08-21 PROBLEM — I25.10 ATHSCL HEART DISEASE OF NATIVE CORONARY ARTERY W/O ANG PCTRS: Status: RESOLVED | Noted: 2023-01-25 | Resolved: 2023-08-21

## 2023-08-21 PROBLEM — I25.84 CORONARY ARTERY CALCIFICATION: Status: RESOLVED | Noted: 2023-01-25 | Resolved: 2023-08-21

## 2023-08-21 PROBLEM — K92.2 GI BLEEDING: Status: RESOLVED | Noted: 2023-01-25 | Resolved: 2023-08-21

## 2023-08-21 PROBLEM — I77.810 MILD ASCENDING AORTA DILATATION (CMS-HCC): Status: ACTIVE | Noted: 2023-08-21

## 2023-08-21 PROBLEM — I25.10 CORONARY ARTERY CALCIFICATION: Status: RESOLVED | Noted: 2023-01-25 | Resolved: 2023-08-21

## 2023-08-22 ENCOUNTER — OFFICE VISIT (OUTPATIENT)
Dept: PRIMARY CARE | Facility: CLINIC | Age: 73
End: 2023-08-22
Payer: MEDICARE

## 2023-08-22 VITALS
BODY MASS INDEX: 39.64 KG/M2 | DIASTOLIC BLOOD PRESSURE: 60 MMHG | OXYGEN SATURATION: 92 % | HEART RATE: 72 BPM | WEIGHT: 215.4 LBS | SYSTOLIC BLOOD PRESSURE: 104 MMHG | HEIGHT: 62 IN | TEMPERATURE: 98.6 F

## 2023-08-22 DIAGNOSIS — Z12.31 ENCOUNTER FOR SCREENING MAMMOGRAM FOR BREAST CANCER: ICD-10-CM

## 2023-08-22 DIAGNOSIS — Z00.00 HEALTHCARE MAINTENANCE: ICD-10-CM

## 2023-08-22 DIAGNOSIS — F43.23 ADJUSTMENT REACTION WITH ANXIETY AND DEPRESSION: Primary | ICD-10-CM

## 2023-08-22 DIAGNOSIS — I10 PRIMARY HYPERTENSION: ICD-10-CM

## 2023-08-22 DIAGNOSIS — R73.9 HYPERGLYCEMIA: ICD-10-CM

## 2023-08-22 LAB — POC HEMOGLOBIN A1C: 5.3 % (ref 4.2–6.5)

## 2023-08-22 PROCEDURE — 3074F SYST BP LT 130 MM HG: CPT | Performed by: PHYSICIAN ASSISTANT

## 2023-08-22 PROCEDURE — 1160F RVW MEDS BY RX/DR IN RCRD: CPT | Performed by: PHYSICIAN ASSISTANT

## 2023-08-22 PROCEDURE — 3008F BODY MASS INDEX DOCD: CPT | Performed by: PHYSICIAN ASSISTANT

## 2023-08-22 PROCEDURE — 83036 HEMOGLOBIN GLYCOSYLATED A1C: CPT | Performed by: PHYSICIAN ASSISTANT

## 2023-08-22 PROCEDURE — 1159F MED LIST DOCD IN RCRD: CPT | Performed by: PHYSICIAN ASSISTANT

## 2023-08-22 PROCEDURE — 1036F TOBACCO NON-USER: CPT | Performed by: PHYSICIAN ASSISTANT

## 2023-08-22 PROCEDURE — 99214 OFFICE O/P EST MOD 30 MIN: CPT | Performed by: PHYSICIAN ASSISTANT

## 2023-08-22 PROCEDURE — 3078F DIAST BP <80 MM HG: CPT | Performed by: PHYSICIAN ASSISTANT

## 2023-08-22 PROCEDURE — 1126F AMNT PAIN NOTED NONE PRSNT: CPT | Performed by: PHYSICIAN ASSISTANT

## 2023-08-22 RX ORDER — DULOXETIN HYDROCHLORIDE 30 MG/1
30 CAPSULE, DELAYED RELEASE ORAL DAILY
Qty: 90 CAPSULE | Refills: 0 | Status: CANCELLED | OUTPATIENT
Start: 2023-08-22 | End: 2023-11-20

## 2023-08-22 RX ORDER — MINOXIDIL 10 MG/1
10 TABLET ORAL DAILY
COMMUNITY
Start: 2023-06-19 | End: 2024-01-08 | Stop reason: ALTCHOICE

## 2023-08-22 RX ORDER — LISINOPRIL 10 MG/1
10 TABLET ORAL DAILY
Qty: 90 TABLET | Refills: 0 | Status: SHIPPED | OUTPATIENT
Start: 2023-08-22 | End: 2023-09-13 | Stop reason: SDUPTHER

## 2023-08-22 RX ORDER — DULOXETIN HYDROCHLORIDE 30 MG/1
30 CAPSULE, DELAYED RELEASE ORAL 2 TIMES DAILY
Qty: 180 CAPSULE | Refills: 0 | Status: SHIPPED | OUTPATIENT
Start: 2023-08-22 | End: 2023-11-06

## 2023-08-22 NOTE — PATIENT INSTRUCTIONS
Please get your Bivalent COVID vaccine    Influenza vaccines will be available in October.    Cymbalta (duloxetine)- take one 30mg dose in the morning and another in the evening.   How Severe Is It?: moderate Is This A New Presentation, Or A Follow-Up?: Follow Up Alopecia Areata

## 2023-08-22 NOTE — ASSESSMENT & PLAN NOTE
"1.  Total coronary artery calcium score of 43.86  falls within the  \"Definite but mild plaque\" category.  "

## 2023-08-22 NOTE — ASSESSMENT & PLAN NOTE
CT in 2021- Mild dilatation of the ascending aorta measuring 3.6 cm.   (Noted 3.8cm in 2018 on ct cardiac calcium score)

## 2023-08-22 NOTE — ASSESSMENT & PLAN NOTE
Normal sinus rhythm  Nonspecific ST abnormality  Abnormal ECG  No previous ECGs available  Confirmed by SRIRAM HOYOS, FARTUN STEELE

## 2023-08-22 NOTE — PROGRESS NOTES
"Subjective     HPI   Soni Cleveland is a 73 y.o. year old female patient with presenting to clinic with concern for   Chief Complaint   Patient presents with    New Patient Visit     Establish care.         Megha Porter presents to clinic to establish as a new patient. She was previously seen by Dr. Luna.     Has had a pneumococcal vaccine.  Has had 5 covid vaccines but has not had bivalent or upto date within 6 months.    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     Compliant with CPAP- she does not use her CPAP. Uncomfortable. Tried variations without benefit.       Depressed- retired from the [employer] at the same time as her  immediately before COVID pandemic. She is frustrated and sad that she put on weight and became sedentary while he has become very active and fit. She has seen psych for counseling before. She has been on cymbalta 30mg every day for\" a long time\". But states that she never really felt better on it.     Patient Active Problem List   Diagnosis    Abnormal EKG    Adrenal nodule (CMS/HCC)    Agatston coronary artery calcium score less than 100    Age-related nuclear cataract, bilateral    Allergic reaction    Anemia    Angina pectoris (CMS/HCC)    Asymmetric SNHL (sensorineural hearing loss)    Primary hypertension    Chronic fatigue    Class 2 severe obesity due to excess calories with serious comorbidity and body mass index (BMI) of 39.0 to 39.9 in adult (CMS/HCC)    Depression with anxiety    Dry eyes    Chronic deep vein thrombosis (DVT) of lower extremity (CMS/HCC)    Dyslipidemia    Dysphagia    Gait abnormality    Heart palpitations    Mild intermittent asthma without complication    Obstructive sleep apnea    Osteoporosis    Posterior vitreous detachment of both eyes    Pulmonary embolism (CMS/HCC)    Renal cyst    Restless legs syndrome    Stage 3b chronic kidney disease (CMS/HCC)    Stress incontinence in female    Tinnitus of " both ears    Tubular adenoma of colon    Vitamin D deficiency    Vitreous floaters    Chronic left-sided low back pain with left-sided sciatica    Mild ascending aorta dilatation (CMS/HCC)       Past Medical History:   Diagnosis Date    Asymmetrical sensorineural hearing loss 03/09/2018    Asymmetrical left sensorineural hearing loss    Chronic fatigue 03/24/2021    History of chronic fatigue    Chronic kidney disease, stage 3a (CMS/HCC) 02/19/2021    now Stage 3B CKD  Aug 2023    Fall 01/25/2023    Subsequent encounter    GI bleeding 01/25/2023    Gross hematuria 01/25/2023    Impacted cerumen, right ear 02/27/2018    Impacted cerumen of right ear    Tinnitus, bilateral 08/18/2020    Tinnitus, bilateral      Past Surgical History:   Procedure Laterality Date    APPENDECTOMY  03/19/2015    Appendectomy    HYSTERECTOMY  03/19/2015    Hysterectomy    TONSILLECTOMY  03/19/2015    Tonsillectomy      Family History   Problem Relation Name Age of Onset    Cirrhosis Mother      Stroke Father          CVA    Heart failure Father          Congestive    Gout Father      Heart attack Father          Myocardial infarction    Colon cancer Sister        Social History     Tobacco Use    Smoking status: Former     Packs/day: 1.00     Years: 10.00     Additional pack years: 0.00     Total pack years: 10.00     Types: Cigarettes    Smokeless tobacco: Never    Tobacco comments:     Was a smoker for about 10 yrs   Substance Use Topics    Alcohol use: Not Currently        Current Outpatient Medications:     apixaban (Eliquis) 5 mg tablet, Take 1 tablet (5 mg) by mouth every 12 hours., Disp: , Rfl:     atorvastatin (Lipitor) 40 mg tablet, Take 1 tablet (40 mg) by mouth once daily., Disp: 90 tablet, Rfl: 0    bisoprolol (Zebeta) 5 mg tablet, Take 0.5 tablets (2.5 mg) by mouth once daily., Disp: , Rfl:     calcium carbonate-vitamin D3 (Oyster Shell) 250 mg-3.125 mcg (125 unit) tablet, Take 1 tablet by mouth 2 times a day with meals.,  "Disp: , Rfl:     denosumab (Prolia) 60 mg/mL syringe, Inject 1 mL (60 mg) under the skin 1 time for 1 dose., Disp: 1 mL, Rfl: 0    dext 70/polycarbophil/peg/NaCl (ARTIFICIAL TEARS SOLUTION OPHT), , Disp: , Rfl:     minoxidil (Loniten) 10 mg tablet, Take 1 tablet (10 mg) by mouth once daily., Disp: , Rfl:     mv-mn/iron/folic acid/herb 190 (VITAMIN D3 COMPLETE ORAL), , Disp: , Rfl:     nitroglycerin (Nitrostat) 0.4 mg SL tablet, DISSOLVE 1 TABLET UNDER THE TONGUE EVERY 5 MINUTES FOR UP TO 3 DOSES AS NEEDED FOR CHEST PAIN.CALL 911 IF PAIN PERSISTS., Disp: , Rfl:     DULoxetine (Cymbalta) 30 mg DR capsule, Take 1 capsule (30 mg) by mouth 2 times a day. Do not crush or chew., Disp: 180 capsule, Rfl: 0    isosorbide mononitrate ER (Imdur) 30 mg 24 hr tablet, Take 1 tablet (30 mg) by mouth once daily., Disp: , Rfl:     lisinopril 10 mg tablet, Take 1 tablet (10 mg) by mouth once daily., Disp: 90 tablet, Rfl: 0     Review of Systems  Constitutional: Denies fever  HEENT: Denies ST, earache  CVS: Denies Chest pain  Pulmonary: Denies wheezing, SOB  GI: Denies N/V  : Denies dysuria  Musculoskeletal:  Denies myalgia  Neuro: Denies focal weakness or numbness.  Skin: Denies Rashes.  *Review of Systems is negative unless otherwise mentioned in HPI or ROS above.    Objective   /60   Pulse 72   Temp 37 °C (98.6 °F)   Ht 1.575 m (5' 2\")   Wt 97.7 kg (215 lb 6.4 oz)   SpO2 92%   BMI 39.40 kg/m²  reviewed Body mass index is 39.4 kg/m².     Physical Exam  Constitutional: NAD.  Resting comfortably.  Head: Atraumatic, normocephalic.  EENT: deferred d/t masking  Cardiac: Regular rate & rhythm.   Pulmonary: Lungs clear bilat  GI: Soft, Nontender, nondistended.   Musculoskeletal: No peripheral edema.   Skin: No evidence of trauma. No rashes  Psych: Intact judgement and insight.    .Assessment/Plan   Problem List Items Addressed This Visit       Primary hypertension    Relevant Medications    lisinopril 10 mg tablet     Other " Visit Diagnoses       Adjustment reaction with anxiety and depression    -  Primary    Relevant Orders    Referral to Psychology    Healthcare maintenance        Relevant Medications    DULoxetine (Cymbalta) 30 mg DR capsule    Hyperglycemia        Relevant Orders    POCT glycosylated hemoglobin (Hb A1C) manually resulted (Completed)    Encounter for screening mammogram for breast cancer        Relevant Orders    BI mammo bilateral screening tomosynthesis

## 2023-08-22 NOTE — ASSESSMENT & PLAN NOTE
Continue care with Dr. Diaz with antihypertensives and cholesterol treatments. Use NTG as prescribed,

## 2023-08-25 DIAGNOSIS — E78.5 DYSLIPIDEMIA: ICD-10-CM

## 2023-08-25 RX ORDER — ATORVASTATIN CALCIUM 40 MG/1
40 TABLET, FILM COATED ORAL DAILY
Qty: 90 TABLET | Refills: 0 | Status: SHIPPED | OUTPATIENT
Start: 2023-08-25 | End: 2023-09-13 | Stop reason: SDUPTHER

## 2023-09-07 LAB
BASOPHILS (10*3/UL) IN BLOOD BY AUTOMATED COUNT: 0.06 X10E9/L (ref 0–0.1)
BASOPHILS/100 LEUKOCYTES IN BLOOD BY AUTOMATED COUNT: 0.7 % (ref 0–2)
EOSINOPHILS (10*3/UL) IN BLOOD BY AUTOMATED COUNT: 0.13 X10E9/L (ref 0–0.4)
EOSINOPHILS/100 LEUKOCYTES IN BLOOD BY AUTOMATED COUNT: 1.4 % (ref 0–6)
ERYTHROCYTE DISTRIBUTION WIDTH (RATIO) BY AUTOMATED COUNT: 13.5 % (ref 11.5–14.5)
ERYTHROCYTE MEAN CORPUSCULAR HEMOGLOBIN CONCENTRATION (G/DL) BY AUTOMATED: 31.2 G/DL (ref 32–36)
ERYTHROCYTE MEAN CORPUSCULAR VOLUME (FL) BY AUTOMATED COUNT: 93 FL (ref 80–100)
ERYTHROCYTES (10*6/UL) IN BLOOD BY AUTOMATED COUNT: 4.7 X10E12/L (ref 4–5.2)
FERRITIN (UG/LL) IN SER/PLAS: 189 UG/L (ref 8–150)
HEMATOCRIT (%) IN BLOOD BY AUTOMATED COUNT: 43.6 % (ref 36–46)
HEMOGLOBIN (G/DL) IN BLOOD: 13.6 G/DL (ref 12–16)
IMMATURE GRANULOCYTES/100 LEUKOCYTES IN BLOOD BY AUTOMATED COUNT: 0.4 % (ref 0–0.9)
IRON (UG/DL) IN SER/PLAS: 84 UG/DL (ref 35–150)
IRON BINDING CAPACITY (UG/DL) IN SER/PLAS: 337 UG/DL (ref 240–445)
IRON SATURATION (%) IN SER/PLAS: 25 % (ref 25–45)
LEUKOCYTES (10*3/UL) IN BLOOD BY AUTOMATED COUNT: 9.2 X10E9/L (ref 4.4–11.3)
LYMPHOCYTES (10*3/UL) IN BLOOD BY AUTOMATED COUNT: 1.44 X10E9/L (ref 0.8–3)
LYMPHOCYTES/100 LEUKOCYTES IN BLOOD BY AUTOMATED COUNT: 15.7 % (ref 13–44)
MONOCYTES (10*3/UL) IN BLOOD BY AUTOMATED COUNT: 0.87 X10E9/L (ref 0.05–0.8)
MONOCYTES/100 LEUKOCYTES IN BLOOD BY AUTOMATED COUNT: 9.5 % (ref 2–10)
NEUTROPHILS (10*3/UL) IN BLOOD BY AUTOMATED COUNT: 6.65 X10E9/L (ref 1.6–5.5)
NEUTROPHILS/100 LEUKOCYTES IN BLOOD BY AUTOMATED COUNT: 72.3 % (ref 40–80)
PLATELETS (10*3/UL) IN BLOOD AUTOMATED COUNT: 235 X10E9/L (ref 150–450)

## 2023-09-08 LAB
IMMUNOGLOBULIN LIGHT CHAINS KAPPA/LAMBDA (MASS RATIO) IN SERUM: 1.23 (ref 0.26–1.65)
IMMUNOGLOBULIN LIGHT CHAINS.KAPPA (MG/DL) IN SERUM: 2.3 MG/DL (ref 0.33–1.94)
IMMUNOGLOBULIN LIGHT CHAINS.LAMBDA (MG/DL) IN SERUM: 1.87 MG/DL (ref 0.57–2.63)

## 2023-09-12 PROBLEM — M25.512 ACUTE PAIN OF LEFT SHOULDER: Status: RESOLVED | Noted: 2023-01-25 | Resolved: 2023-09-12

## 2023-09-12 PROBLEM — B07.0 PLANTAR WARTS: Status: RESOLVED | Noted: 2023-01-25 | Resolved: 2023-09-12

## 2023-09-12 PROBLEM — L65.9 HAIR LOSS: Status: RESOLVED | Noted: 2023-01-25 | Resolved: 2023-09-12

## 2023-09-12 PROBLEM — M25.522 LEFT ELBOW PAIN: Status: RESOLVED | Noted: 2023-01-25 | Resolved: 2023-09-12

## 2023-09-12 PROBLEM — F41.9 ANXIETY: Status: RESOLVED | Noted: 2023-01-25 | Resolved: 2023-09-12

## 2023-09-12 PROBLEM — R06.02 EXERTIONAL SHORTNESS OF BREATH: Status: RESOLVED | Noted: 2023-01-25 | Resolved: 2023-09-12

## 2023-09-12 NOTE — PROGRESS NOTES
Subjective     HPI   Soni Cleveland is a 73 y.o. year old female patient with presenting to clinic with concern for   Chief Complaint   Patient presents with    Follow-up       Fatigue since increasing cymbalta to 30mg BID from every day. Try taking 60 mg at bedtime instead.  Prolia due 12/12/23, labs need ordered. Needs to be on Rx Ca and Vit D. DEXA due in 5 more years.    Want to discuss vaccines. Recommended covid vaccine update and seasonal flu and RSV vaccine if eligible and Boostrix Tdap.         Patient Active Problem List   Diagnosis    Abnormal EKG    Abnormally compliant left middle ear system with type AD tympanogram curve    Adrenal nodule (CMS/HCC)    Agatston coronary artery calcium score less than 100    Age-related nuclear cataract, bilateral    Allergic reaction    Anemia    Angina pectoris (CMS/HCC)    Asymmetric SNHL (sensorineural hearing loss)    Primary hypertension    Chronic fatigue    Class 2 severe obesity due to excess calories with serious comorbidity and body mass index (BMI) of 39.0 to 39.9 in adult (CMS/HCC)    Depression with anxiety    Dry eyes    Chronic deep vein thrombosis (DVT) of lower extremity (CMS/HCC)    Dyslipidemia    Dysphagia    Gait abnormality    Heart palpitations    Mild intermittent asthma without complication    Neck pain    Obstructive sleep apnea    Osteoporosis    Posterior vitreous detachment of both eyes    Presbyacusis    Pulmonary embolism (CMS/HCC)    Renal cyst    Restless legs syndrome    Stage 3b chronic kidney disease (CMS/HCC)    Stress incontinence in female    Tinnitus of both ears    Tubular adenoma of colon    Vitamin D deficiency    Vitreous floaters    Decreased hearing    Acute low back pain    Chronic left-sided low back pain with left-sided sciatica    Mild ascending aorta dilatation (CMS/HCC)       Past Medical History:   Diagnosis Date    Asymmetrical sensorineural hearing loss 03/09/2018    Asymmetrical left sensorineural hearing loss     Chronic fatigue 03/24/2021    History of chronic fatigue    Chronic kidney disease, stage 3a (CMS/HCC) 02/19/2021    now Stage 3B CKD  Aug 2023    Fall 01/25/2023    Subsequent encounter    GI bleeding 01/25/2023    Gross hematuria 01/25/2023    Impacted cerumen, right ear 02/27/2018    Impacted cerumen of right ear    Tinnitus, bilateral 08/18/2020    Tinnitus, bilateral      Past Surgical History:   Procedure Laterality Date    APPENDECTOMY  03/19/2015    Appendectomy    HYSTERECTOMY  03/19/2015    Hysterectomy    MR HEAD ANGIO WO IV CONTRAST  2/21/2012    MR HEAD ANGIO WO IV CONTRAST [facility]    MR NECK ANGIO WO IV CONTRAST  2/21/2012    MR NECK ANGIO WO IV CONTRAST [facility]    TONSILLECTOMY  03/19/2015    Tonsillectomy      Family History   Problem Relation Name Age of Onset    Cirrhosis Mother      Stroke Father          CVA    Heart failure Father          Congestive    Gout Father      Heart attack Father          Myocardial infarction    Colon cancer Sister        Social History     Tobacco Use    Smoking status: Former     Packs/day: 1.00     Years: 10.00     Additional pack years: 0.00     Total pack years: 10.00     Types: Cigarettes    Smokeless tobacco: Never    Tobacco comments:     Was a smoker for about 10 yrs   Substance Use Topics    Alcohol use: Not Currently        Current Outpatient Medications:     apixaban (Eliquis) 5 mg tablet, Take 1 tablet (5 mg) by mouth every 12 hours., Disp: , Rfl:     denosumab (Prolia) 60 mg/mL syringe, Inject 1 mL (60 mg) under the skin 1 time for 1 dose., Disp: 1 mL, Rfl: 0    dext 70/polycarbophil/peg/NaCl (ARTIFICIAL TEARS SOLUTION OPHT), , Disp: , Rfl:     DULoxetine (Cymbalta) 30 mg DR capsule, Take 1 capsule (30 mg) by mouth 2 times a day. Do not crush or chew., Disp: 180 capsule, Rfl: 0    minoxidil (Loniten) 10 mg tablet, Take 1 tablet (10 mg) by mouth once daily., Disp: , Rfl:     mv-mn/iron/folic acid/herb 190 (VITAMIN D3 COMPLETE  "ORAL), , Disp: , Rfl:     nitroglycerin (Nitrostat) 0.4 mg SL tablet, DISSOLVE 1 TABLET UNDER THE TONGUE EVERY 5 MINUTES FOR UP TO 3 DOSES AS NEEDED FOR CHEST PAIN.CALL 911 IF PAIN PERSISTS., Disp: , Rfl:     atorvastatin (Lipitor) 40 mg tablet, Take 1 tablet (40 mg) by mouth once daily at bedtime., Disp: 90 tablet, Rfl: 3    bisoprolol (Zebeta) 5 mg tablet, Take 0.5 tablets (2.5 mg) by mouth once daily., Disp: 45 tablet, Rfl: 3    calcium carbonate-vitamin D3 (Oyster Shell) 250 mg-3.125 mcg (125 unit) tablet, Take 1 tablet by mouth 2 times a day with meals., Disp: 180 tablet, Rfl: 3    isosorbide mononitrate ER (Imdur) 30 mg 24 hr tablet, Take 1 tablet (30 mg) by mouth once daily., Disp: 90 tablet, Rfl: 3    lisinopril 10 mg tablet, Take 1 tablet (10 mg) by mouth once daily., Disp: 90 tablet, Rfl: 3     Review of Systems  Constitutional: Denies fever  HEENT: Denies ST, earache  CVS: Denies Chest pain  Pulmonary: Denies wheezing, SOB  GI: Denies N/V  : Denies dysuria  Musculoskeletal:  Denies myalgia  Neuro: Denies focal weakness or numbness.  Skin: Denies Rashes.  *Review of Systems is negative unless otherwise mentioned in HPI or ROS above.    Objective   /80   Pulse 78   Temp 37 °C (98.6 °F)   Ht 1.575 m (5' 2\")   Wt 96.2 kg (212 lb)   SpO2 95%   BMI 38.78 kg/m²  reviewed Body mass index is 38.78 kg/m².     Physical Exam  Constitutional: NAD.  Resting comfortably.  Head: Atraumatic, normocephalic.  ENT: Moist oral mucosa. Nasal mucosa mildly edematous and nonerythematous.   Cardiac: Regular rate & rhythm.   Pulmonary: Lungs clear bilat  GI: Soft, Nontender, nondistended.   Musculoskeletal: No peripheral edema.   Skin: No evidence of trauma. No rashes  Psych: Intact judgement and insight.    .Assessment/Plan   Problem List Items Addressed This Visit       Primary hypertension    Relevant Medications    lisinopril 10 mg tablet    isosorbide mononitrate ER (Imdur) 30 mg 24 hr tablet    bisoprolol " (Zebeta) 5 mg tablet    Other Relevant Orders    CBC    Dyslipidemia    Relevant Medications    atorvastatin (Lipitor) 40 mg tablet    Osteoporosis - Primary    Relevant Medications    calcium carbonate-vitamin D3 (Oyster Shell) 250 mg-3.125 mcg (125 unit) tablet    Other Relevant Orders    Basic Metabolic Panel    Calcium    Magnesium    Phosphorus     Other Visit Diagnoses       Routine general medical examination at a health care facility        Relevant Orders    CBC    Borderline hyperlipidemia        Relevant Orders    TSH with reflex to Free T4 if abnormal    Vitamin D insufficiency        Relevant Orders    Vitamin D 25-Hydroxy,Total (for eval of Vitamin D levels)

## 2023-09-13 ENCOUNTER — OFFICE VISIT (OUTPATIENT)
Dept: PRIMARY CARE | Facility: CLINIC | Age: 73
End: 2023-09-13
Payer: MEDICARE

## 2023-09-13 VITALS
SYSTOLIC BLOOD PRESSURE: 116 MMHG | HEIGHT: 62 IN | TEMPERATURE: 98.6 F | HEART RATE: 78 BPM | DIASTOLIC BLOOD PRESSURE: 80 MMHG | OXYGEN SATURATION: 95 % | BODY MASS INDEX: 39.01 KG/M2 | WEIGHT: 212 LBS

## 2023-09-13 DIAGNOSIS — Z00.00 ROUTINE GENERAL MEDICAL EXAMINATION AT A HEALTH CARE FACILITY: ICD-10-CM

## 2023-09-13 DIAGNOSIS — M81.0 AGE-RELATED OSTEOPOROSIS WITHOUT CURRENT PATHOLOGICAL FRACTURE: ICD-10-CM

## 2023-09-13 DIAGNOSIS — E78.5 BORDERLINE HYPERLIPIDEMIA: ICD-10-CM

## 2023-09-13 DIAGNOSIS — I10 PRIMARY HYPERTENSION: ICD-10-CM

## 2023-09-13 DIAGNOSIS — E78.5 DYSLIPIDEMIA: ICD-10-CM

## 2023-09-13 DIAGNOSIS — E55.9 VITAMIN D INSUFFICIENCY: ICD-10-CM

## 2023-09-13 DIAGNOSIS — M81.0 AGE RELATED OSTEOPOROSIS, UNSPECIFIED PATHOLOGICAL FRACTURE PRESENCE: Primary | ICD-10-CM

## 2023-09-13 PROCEDURE — 1036F TOBACCO NON-USER: CPT | Performed by: PHYSICIAN ASSISTANT

## 2023-09-13 PROCEDURE — 3008F BODY MASS INDEX DOCD: CPT | Performed by: PHYSICIAN ASSISTANT

## 2023-09-13 PROCEDURE — 1159F MED LIST DOCD IN RCRD: CPT | Performed by: PHYSICIAN ASSISTANT

## 2023-09-13 PROCEDURE — 1160F RVW MEDS BY RX/DR IN RCRD: CPT | Performed by: PHYSICIAN ASSISTANT

## 2023-09-13 PROCEDURE — 99214 OFFICE O/P EST MOD 30 MIN: CPT | Performed by: PHYSICIAN ASSISTANT

## 2023-09-13 PROCEDURE — 3074F SYST BP LT 130 MM HG: CPT | Performed by: PHYSICIAN ASSISTANT

## 2023-09-13 PROCEDURE — 1126F AMNT PAIN NOTED NONE PRSNT: CPT | Performed by: PHYSICIAN ASSISTANT

## 2023-09-13 PROCEDURE — 3079F DIAST BP 80-89 MM HG: CPT | Performed by: PHYSICIAN ASSISTANT

## 2023-09-13 RX ORDER — LISINOPRIL 10 MG/1
10 TABLET ORAL DAILY
Qty: 90 TABLET | Refills: 3 | Status: SHIPPED | OUTPATIENT
Start: 2023-09-13 | End: 2024-09-12

## 2023-09-13 RX ORDER — BISOPROLOL FUMARATE 5 MG/1
2.5 TABLET, FILM COATED ORAL DAILY
Qty: 45 TABLET | Refills: 3 | Status: SHIPPED | OUTPATIENT
Start: 2023-09-13 | End: 2024-09-12

## 2023-09-13 RX ORDER — ISOSORBIDE MONONITRATE 30 MG/1
30 TABLET, EXTENDED RELEASE ORAL DAILY
Qty: 90 TABLET | Refills: 3 | Status: SHIPPED | OUTPATIENT
Start: 2023-09-13 | End: 2023-12-15 | Stop reason: ALTCHOICE

## 2023-09-13 RX ORDER — ATORVASTATIN CALCIUM 40 MG/1
40 TABLET, FILM COATED ORAL NIGHTLY
Qty: 90 TABLET | Refills: 3 | Status: SHIPPED | OUTPATIENT
Start: 2023-09-13 | End: 2024-09-12

## 2023-09-13 RX ORDER — CALCIUM CARBONATE/VITAMIN D3 250-3.125
1 TABLET ORAL
Qty: 180 TABLET | Refills: 3 | Status: SHIPPED | OUTPATIENT
Start: 2023-09-13 | End: 2024-09-12

## 2023-09-13 ASSESSMENT — PATIENT HEALTH QUESTIONNAIRE - PHQ9
1. LITTLE INTEREST OR PLEASURE IN DOING THINGS: NOT AT ALL
2. FEELING DOWN, DEPRESSED OR HOPELESS: NOT AT ALL
SUM OF ALL RESPONSES TO PHQ9 QUESTIONS 1 AND 2: 0

## 2023-11-03 ENCOUNTER — OFFICE VISIT (OUTPATIENT)
Dept: OTOLARYNGOLOGY | Facility: CLINIC | Age: 73
End: 2023-11-03
Payer: MEDICARE

## 2023-11-03 DIAGNOSIS — H91.93 DECREASED HEARING OF BOTH EARS: Primary | ICD-10-CM

## 2023-11-03 DIAGNOSIS — H93.13 TINNITUS OF BOTH EARS: ICD-10-CM

## 2023-11-03 PROCEDURE — 1160F RVW MEDS BY RX/DR IN RCRD: CPT | Performed by: OTOLARYNGOLOGY

## 2023-11-03 PROCEDURE — 3074F SYST BP LT 130 MM HG: CPT | Performed by: OTOLARYNGOLOGY

## 2023-11-03 PROCEDURE — 1036F TOBACCO NON-USER: CPT | Performed by: OTOLARYNGOLOGY

## 2023-11-03 PROCEDURE — 3079F DIAST BP 80-89 MM HG: CPT | Performed by: OTOLARYNGOLOGY

## 2023-11-03 PROCEDURE — 1126F AMNT PAIN NOTED NONE PRSNT: CPT | Performed by: OTOLARYNGOLOGY

## 2023-11-03 PROCEDURE — 99213 OFFICE O/P EST LOW 20 MIN: CPT | Performed by: OTOLARYNGOLOGY

## 2023-11-03 PROCEDURE — 3008F BODY MASS INDEX DOCD: CPT | Performed by: OTOLARYNGOLOGY

## 2023-11-03 PROCEDURE — 1159F MED LIST DOCD IN RCRD: CPT | Performed by: OTOLARYNGOLOGY

## 2023-11-03 NOTE — PROGRESS NOTES
Chief Complaint     follow up tinnitus      History of Present Illness    11.03.2023: She has decreased hearing bilaterally, more on the left side. She has severely annoying tinnitus.     Plan:  1- Hearing test  2- information regarding lenire was provided    _________________________________________________________________    05.05.2023: Tinnitus makes her crazy at night. During the day she can manage it. It is like cricket sounds.  TMs look intact.     Recommendations:  1- hearing test  2- consider lenire device     ____________________________________________________________________________________________        03.08.2021: Ms. Cleveland is a 71 yo F. She is referred by Dr. John for tinnitus in ears. It has been going on for years.   Secondly, she has itching in her ear canals.     ____________________________________________________________________________________________     03.24.2021: She has bilateral presbyacusis and tinnitus. She had her hearing test today. Compared to her hearing test which was done ~3 years ago, there is not much progress in her hearing loss. It was explained to her that tinnitus is a benign condition and may happen with age related hearing loss. Stress and focusing on tinnitus can make it psychologically worse. Some of her current medications may have caused or may have increased her tinnitus (aspirin, lisonopril and/or atorvastatin). I recommend to ask Dr. John if it is possible to stop/pause some of these medications for some time or not.      Recommendations:  1- avoid focusing on your tinnitus  2- follow up in 3 years      Review of Systems  Below is a copy of her initial ROS, she does not have fever today.     Constitutional: no fever.   ENMT: difficulty with hearing~and~tinnitus.      Active Problems     · Abnormal EKG (794.31) (R94.31)   · Abnormally compliant left middle ear system with type AD tympanogram curve (385.89)  (R94.120)   · Acute low back pain (724.2)  (M54.50)   · Acute pain of left shoulder (719.41) (M25.512)   · Adrenal nodule (255.8) (E27.8)   · Agatston coronary artery calcium score less than 100 (793.2) (R93.1)   · Age-related nuclear cataract, bilateral (366.16) (H25.13)   · Allergic reaction (995.3) (T78.40XA)   · RAVI positive (795.79) (R76.8)   · Anemia (285.9) (D64.9)   · Angina pectoris (413.9) (I20.9)   · Angina pectoris   · Anxiety (300.00) (F41.9)   · Asymmetric SNHL (sensorineural hearing loss) (389.16) (H90.3)   · Athscl heart disease of native coronary artery w/o ang pctrs (414.01) (I25.10)   · Benign essential hypertension (401.1) (I10)   · BMI 39.0-39.9,adult (V85.39) (Z68.39)   · Breast cancer screening (V76.10) (Z12.39)   · Chronic fatigue (780.79) (R53.82)   · Chronic left-sided low back pain with left-sided sciatica (724.2,724.3,338.29)  (M54.42,G89.29)   · CKD (chronic kidney disease) (585.9) (N18.9)   · Class 2 severe obesity due to excess calories with serious comorbidity and body mass  index (BMI) of 38.0 to 38.9 in adult (278.01,V85.38) (E66.01,Z68.38)   · Class 2 severe obesity due to excess calories with serious comorbidity and body mass  index (BMI) of 39.0 to 39.9 in adult (278.01,V85.39) (E66.01,Z68.39)   · Class 3 severe obesity due to excess calories with serious comorbidity and body mass  index (BMI) of 40.0 to 44.9 in adult (278.01,V85.41) (E66.01,Z68.41)   · Colon cancer screening (V76.51) (Z12.11)   · Coronary artery calcification (414.00,414.4) (I25.10,I25.84)   · Coronary atherosclerosis (414.00) (I25.10)   · Decreased hearing (389.9) (H91.90)   · Depression (311) (F32.A)   · Depression with anxiety (300.4) (F41.8)   · Dry eyes (375.15) (H04.123)   · DVT (deep venous thrombosis) (453.40) (I82.409)   · Dyslipidemia (272.4) (E78.5)   · Dysphagia (787.20) (R13.10)   · Encounter for colorectal cancer screening (V76.51,V76.41) (Z12.11,Z12.12)   · Encounter for immunization (V03.89) (Z23)   · Encounter for preventive health  examination (V70.0) (Z00.00)   · Exertional shortness of breath (786.05) (R06.02)   · Fall, subsequent encounter (V58.89,E888.9) (W19.XXXD)   · Gait abnormality (781.2) (R26.9)   · GI bleeding (578.9) (K92.2)   · GI bleeding (578.9) (K92.2)   · Gross hematuria (599.71) (R31.0)   · Hair loss (704.00) (L65.9)   · Heart palpitations (785.1) (R00.2)   · History of long-term treatment with high-risk medication (V58.69) (Z79.899)   · Left elbow pain (719.42) (M25.522)   · Mild intermittent asthma without complication (493.90) (J45.20)   · Neck pain (723.1) (M54.2)   · Obesity (278.00) (E66.9)   · Obstructive sleep apnea (327.23) (G47.33)   · Osteoporosis (733.00) (M81.0)   · Plantar warts (078.12) (B07.0)   · Posterior vitreous detachment of both eyes (379.21) (H43.813)   · Postmenopausal status (V49.81) (Z78.0)   · Presbyacusis (388.01) (H91.10)   · Pulmonary embolism (415.19) (I26.99)   · Pulmonary hypertension, mild (416.8) (I27.20)   · Pulmonary hypertension, mild   · Renal cyst (753.10) (N28.1)   · Restless legs syndrome (333.94) (G25.81)   · Stage 1 chronic kidney disease (585.1) (N18.1)   · Stage 3a chronic kidney disease (585.3) (N18.31)   · Stage 3b chronic kidney disease (585.3) (N18.32)   · Stress incontinence in female (625.6) (N39.3)   · Tinnitus (388.30) (H93.19)   · Tinnitus of both ears (388.30) (H93.13)   · Tinnitus, bilateral (388.30) (H93.13)   · Tubular adenoma of colon (211.3) (D12.6)   · Vitamin D deficiency (268.9) (E55.9)   · Vitreous floaters (379.24) (H43.399)        Vitamin D deficiency (268.9) (E55.9)       Osteoporosis (733.00) (M81.0)       Dyslipidemia (272.4) (E78.5)       Chronic fatigue (780.79) (R53.82)           Past Medical History     · History of Acute pain of left shoulder (719.41) (M25.512)   · Resolved Date: 24 Mar 2021   · History of Acute pain of left shoulder (719.41) (M25.512)   · Resolved Date: 02 Aug 2021   · History of Ankle pain (719.47) (M25.579)   · Resolved Date: 01 Jun  2016   · History of Anxiety (300.00) (F41.9)   · Resolved Date: 01 Jun 2016   · History of Asymmetrical left sensorineural hearing loss   · Resolved Date: 24 Mar 2021   · History of Benign essential hypertension (401.1) (I10)   · Resolved Date: 20 Oct 2015   · History of Hand pain (729.5) (M79.643)   · Resolved Date: 01 Jun 2016   · History of chest pain (V13.89) (Z87.898)   · Resolved Date: 01 Jun 2016   · History of chronic fatigue (V13.89) (Z87.898)   · Resolved Date: 19 Feb 2021   · History of chronic fatigue (V13.89) (Z87.898)   · Resolved Date: 24 Mar 2021   · History of dizziness (V13.89) (Z87.898)   · Resolved Date: 01 Jun 2016   · History of fatigue (V13.89) (Z87.898)   · Resolved Date: 01 Jun 2016   · History of neck pain (V13.59) (Z87.39)   · Resolved Date: 07 Jul 2022   · History of neck pain (V13.59) (Z87.39)   · Resolved Date: 07 Jul 2022   · History of obesity (V12.29) (Z86.39)   · Resolved Date: 05 May 2021   · History of obesity (V12.29) (Z86.39)   · Resolved Date: 02 Aug 2021   · History of tinea corporis (V12.09) (Z86.19)   · Resolved Date: 01 Jun 2016   · History of vertigo (V12.49) (Z87.898)   · Resolved Date: 01 Jun 2016   · History of Impacted cerumen of right ear (380.4) (H61.21)   · Resolved Date: 24 Mar 2021   · History of Intertrigo (695.89) (L30.4)   · Resolved Date: 01 Jun 2016   · History of Need for hepatitis C screening test (V73.89) (Z11.59)   · Resolved Date: 26 Jul 2018   · History of No significant past medical history   · History of SOB (shortness of breath) on exertion (786.05) (R06.02)   · Resolved Date: 01 Jun 2016   · History of Stage 3a chronic kidney disease (585.3) (N18.31)   · Resolved Date: 05 May 2021   · History of Stage 3b chronic kidney disease (585.3) (N18.32)   · Resolved Date: 05 May 2021   · History of Tinnitus, bilateral (388.30) (H93.13)   · Resolved Date: 19 Feb 2021     Surgical History     · History of Appendectomy   · History of Hysterectomy   · History of  Tonsillectomy     Family History     · Family history of hepatic cirrhosis (V18.59) (Z83.79)     · Family history of cerebrovascular accident (V17.1) (Z82.3)   · Family history of congestive heart failure (V17.49) (Z82.49)   · Family history of gout (V18.19) (Z82.69)   · Family history of myocardial infarction (V17.3) (Z82.49)     · Family history of colon cancer (V16.0) (Z80.0)     Social History     · Denied: History of Alcohol   · H/O nicotine dependence (V15.82) (Z87.891)   · Never Used Drugs     Allergies     · Penicillins   Recorded By: Oksana Causey; 5/7/2013 4:34:28 PM     Current Meds     Medication Name Instruction   Artificial Tears SOLN     Atorvastatin Calcium 40 MG Oral Tablet Take 1 tablet daily   Bisoprolol Fumarate 5 MG Oral Tablet TAKE 0.5 TABLET Daily   DULoxetine HCl - 30 MG Oral Capsule Delayed Release Particles TAKE 1 CAPSULE Daily   Eliquis 5 MG Oral Tablet TAKE 1 TABLET Every twelve hours   Isosorbide Mononitrate ER 30 MG Oral Tablet Extended Release 24 Hour TAKE 1 TABLET DAILY.   Lisinopril 10 MG Oral Tablet Take 1 tablet daily   Nitroglycerin 0.4 MG Sublingual Tablet Sublingual DISSOLVE 1 TABLET UNDER THE TONGUE EVERY 5 MINUTES FOR UP TO 3 DOSES AS NEEDED FOR CHEST PAIN.CALL 911 IF PAIN PERSISTS.   Ozempic (0.25 or 0.5 MG/DOSE) 2 MG/1.5ML SOPN     Prolia 60 MG/ML Subcutaneous Solution Prefilled Syringe INJECT 60 MG Once   Vitamin D3 Complete Oral Tablet        Physical Exam  (Old exam)  General appearance: Healthy-appearing, well-nourished, well groomed, in no acute distress.      Head and Face: Atraumatic with no masses, lesions, or scarring.      Facial strength: Normal strength and symmetry, no synkinesis or facial tic.      Eyes: Conjunctivas look non-hyperemic bilaterally     Ears: Ear canals look normal. Mild wax bilaterally. Tympanic membranes look intact, no hyperemia, fluid or retraction. Mild sclerosis at left posteroinferiorly. She may have had effusion when she was a little  kid.     Nose: No purulent discharge. (previous exam)     Throat: No tonsil hypertrophy. No hyperemia. (previous exam)     Pulmonary: Normal respiratory effort.      Lymphatic No palpable pathologic lymph nodes at neck. (previous exam)     Neurological/Psychiatric Orientation to person, place, and time: Normal.   Mood and affect: Normal.      Extremities: No clubbing.      Skin: No skin lesions were noted at face or neck      Procedure  03.24.2021: Hearing Test  Appointment time: 9:10-9:40     Otoscopy revealed clear ear canals with visual inspection of the tympanic membranes bilaterally.     Behavioral hearing evaluation revealed slight asymmetry with the left ear being worse:  Right ear - normal hearing sensitivity 250-2000 Hz sloping to mild sensorineural hearing loss  Left ear - normal hearing sensitivity 250-2000 Hz sloping to moderate sensorineural hearing loss  *slight decrease bilaterally 4094-2477 Hz slince 2018     Speech reception thresholds obtained at a level consistent with pure tone thresholds bilaterally.     Word discrimination excellent bilaterally.     Tympanometry revealed:  Right ear - Type A, normal middle ear function  Left ear - Type Ad, eardrum hypermobility with normal ear canal volume and middle ear pressure     Ipsilateral/contralateral acoustic reflexes present 500-4000 Hz bilaterally.        Recommendations:  1) Re-evaluate hearing annually, or sooner, if a change in hearing is noted  2) Hearing aids for both ears if struggling to hear  3) Use of fan or soothing noise during times of quiet (bed time) to help mask tinnitus     03.07.2018: Hearing test     Otoscopy revealed clear ear canals with visual inspection of the tympanic membranes bilaterally.     Behavioral hearing evaluation revealed slight asymmetry with the left ear being worse:  Right ear - normal hearing sensitivity 250-2000 Hz sloping to a mild high frequency sensorineural hearing loss  Left ear - normal hearing sensitivity  250-2000 Hz sloping to a moderate high frequency sensorineural hearing loss     Speech reception thresholds obtained at a level consistent with pure tone thresholds bilaterally.     Word discrimination excellent bilaterally.     Tympanometry revealed normal middle ear function bilaterally.     Ipsilateral acoustic reflexes present 500-4000 Hz bilaterally.    Diagnoses/Problems     · Tinnitus of both ears (388.30) (H93.13)     Assessment and Plan:     11.03.2023: She has decreased hearing bilaterally, more on the left side. She has severely annoying tinnitus.     Plan:  1- Hearing test  2- information regarding lenire was provided    _________________________________________________________________    05.05.2023: Tinnitus makes her crazy at night. During the day she can manage it. It is like cricket sounds.  TMs look intact.     Recommendations:  1- hearing test  2- consider lenire device     ____________________________________________________________________________________________     03.24.2021: She has bilateral presbyacusis and tinnitus. She had her hearing test today. Compared to her hearing test which was done ~3 years ago, there is not much progress in her hearing loss. It was explained to her that tinnitus is a benign condition and may happen with age related hearing loss. Stress and focusing on tinnitus can make it psychologically worse. Some of her current medications may have caused or may have increased her tinnitus (aspirin, lisonopril and/or atorvastatin). I recommend to ask Dr. John if it is possible to stop/pause some of these medications for some time or not.      Recommendations:  1- avoid focusing on your tinnitus  2- follow up in 3 years

## 2023-11-04 DIAGNOSIS — Z00.00 HEALTHCARE MAINTENANCE: ICD-10-CM

## 2023-11-06 RX ORDER — DULOXETIN HYDROCHLORIDE 30 MG/1
30 CAPSULE, DELAYED RELEASE ORAL 2 TIMES DAILY
Qty: 180 CAPSULE | Refills: 1 | Status: SHIPPED | OUTPATIENT
Start: 2023-11-06 | End: 2024-04-04 | Stop reason: SDUPTHER

## 2023-11-27 PROBLEM — M81.0 AGE-RELATED OSTEOPOROSIS WITHOUT CURRENT PATHOLOGICAL FRACTURE: Status: ACTIVE | Noted: 2023-11-27

## 2023-11-29 ENCOUNTER — APPOINTMENT (OUTPATIENT)
Dept: OPHTHALMOLOGY | Facility: CLINIC | Age: 73
End: 2023-11-29
Payer: MEDICARE

## 2023-12-13 ENCOUNTER — OFFICE VISIT (OUTPATIENT)
Dept: PRIMARY CARE | Facility: CLINIC | Age: 73
End: 2023-12-13
Payer: MEDICARE

## 2023-12-13 VITALS
OXYGEN SATURATION: 93 % | SYSTOLIC BLOOD PRESSURE: 115 MMHG | WEIGHT: 215.8 LBS | BODY MASS INDEX: 39.71 KG/M2 | TEMPERATURE: 97 F | HEIGHT: 62 IN | HEART RATE: 68 BPM | DIASTOLIC BLOOD PRESSURE: 80 MMHG

## 2023-12-13 DIAGNOSIS — Z00.00 MEDICARE ANNUAL WELLNESS VISIT, INITIAL: Primary | ICD-10-CM

## 2023-12-13 DIAGNOSIS — Z00.00 ROUTINE GENERAL MEDICAL EXAMINATION AT HEALTH CARE FACILITY: ICD-10-CM

## 2023-12-13 DIAGNOSIS — L30.9 HAND DERMATITIS: ICD-10-CM

## 2023-12-13 PROCEDURE — 3008F BODY MASS INDEX DOCD: CPT | Performed by: PHYSICIAN ASSISTANT

## 2023-12-13 PROCEDURE — 1160F RVW MEDS BY RX/DR IN RCRD: CPT | Performed by: PHYSICIAN ASSISTANT

## 2023-12-13 PROCEDURE — 1170F FXNL STATUS ASSESSED: CPT | Performed by: PHYSICIAN ASSISTANT

## 2023-12-13 PROCEDURE — 3074F SYST BP LT 130 MM HG: CPT | Performed by: PHYSICIAN ASSISTANT

## 2023-12-13 PROCEDURE — 3079F DIAST BP 80-89 MM HG: CPT | Performed by: PHYSICIAN ASSISTANT

## 2023-12-13 PROCEDURE — 1126F AMNT PAIN NOTED NONE PRSNT: CPT | Performed by: PHYSICIAN ASSISTANT

## 2023-12-13 PROCEDURE — G0438 PPPS, INITIAL VISIT: HCPCS | Performed by: PHYSICIAN ASSISTANT

## 2023-12-13 PROCEDURE — 1036F TOBACCO NON-USER: CPT | Performed by: PHYSICIAN ASSISTANT

## 2023-12-13 PROCEDURE — 1159F MED LIST DOCD IN RCRD: CPT | Performed by: PHYSICIAN ASSISTANT

## 2023-12-13 RX ORDER — TRIAMCINOLONE ACETONIDE 1 MG/G
CREAM TOPICAL 2 TIMES DAILY
Qty: 30 G | Refills: 0 | Status: SHIPPED | OUTPATIENT
Start: 2023-12-13

## 2023-12-13 ASSESSMENT — ENCOUNTER SYMPTOMS
OCCASIONAL FEELINGS OF UNSTEADINESS: 0
LOSS OF SENSATION IN FEET: 0
DEPRESSION: 0

## 2023-12-13 ASSESSMENT — ACTIVITIES OF DAILY LIVING (ADL)
DOING_HOUSEWORK: INDEPENDENT
TAKING_MEDICATION: INDEPENDENT
BATHING: INDEPENDENT
DRESSING: INDEPENDENT
MANAGING_FINANCES: INDEPENDENT
GROCERY_SHOPPING: INDEPENDENT

## 2023-12-13 NOTE — PROGRESS NOTES
"Subjective   HPI   Soni Cleveland is a 73 y.o. year old female patient with presenting to clinic with concern for Medicare Visit     Chief Complaint   Patient presents with    Medicare Annual Wellness Visit Subsequent     Has shot coming up wants to make sure order is in  Hands shake and itch in the morning         Fatigue since increasing cymbalta to 30mg BID from every day. Try taking 60 mg at bedtime instead.    Needs to be on Rx Ca and Vit D. DEXA due in 5 more years.    Shaky hands at times in the mornings and times of stress. Infrequent.   Itching hands and dry skin. Hx dryness.     May 2024 will need colonoscopy ordered properly    Prolia infusion coming up soon.     Previous visit- Recommended covid vaccine update and seasonal flu and RSV vaccine if eligible and Boostrix Tdap.     Has had a pneumococcal vaccine.  Has had 5 covid vaccines but has not had bivalent or upto date within 6 months.    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 Works in [employer] keeping 1/2 day once a week.     NonCompliant with CPAP- she does not use her CPAP. Uncomfortable. Tried variations without benefit.     Depressed- retired from the [employer] at the same time as her  immediately before COVID pandemic. She is frustrated and sad that she put on weight and became sedentary while he has become very active and fit. She has seen psych for counseling before. She has been on cymbalta 30mg every day for\" a long time\". But states that she never really felt better on it.       Patient Care Team:  Vanessa Dsouza PA-C as PCP - General (Family Medicine)  Huy John MD as PCP - [facility] Attributed Provider   No Patient Care Coordination Note on file.       Specialists      Screenings  Labs-(50PSA, Mg,CK,B12,  PAP- (21-65yr,HPV q5)   SANDRA-(40yr)  DEXA-(65yr,depot,steroid)  Colonosc-(50)  CT cardiac Ca (40y)  LDCT (20 pack yr)     AAA US- (smoke,60y, FamHx,CAD, PAD,HTN,Chol)     PVD US- (LE " discolor/anesthesia)  Dentist-  Ophtho-    Body mass index is 39.47 kg/m². BMI (annual wellness)- morbid>40, nutritionist <19     Preventative Health   Health Maintenance Due   Topic Date Due    Hepatitis A Vaccine (1 of 2 - Risk 2-dose series) Never done    Hepatitis B Vaccine (1 of 3 - Risk 3-dose series) Never done            Topic Date Due    Hepatitis A Vaccines (1 of 2 - Risk 2-dose series) Never done    Hepatitis B Vaccines (1 of 3 - Risk 3-dose series) Never done        Immunizations Reviewed  Immunization History   Administered Date(s) Administered    Flu vaccine, quadrivalent, high-dose, preservative free, age 65y+ (FLUZONE) 12/02/2021    Influenza, High Dose Seasonal, Preservative Free 10/09/2019    Influenza, Seasonal, Quadrivalent, Adjuvanted 11/05/2020, 09/12/2022    Influenza, seasonal, injectable 09/12/2022    Influenza, seasonal, injectable, preservative free 10/01/2014    Moderna COVID-19 vaccine, bivalent, blue cap/gray label *Check age/dose* 10/08/2022    Moderna SARS-CoV-2 Vaccination 02/05/2021, 03/05/2021, 11/18/2021, 04/05/2022, 10/08/2022    Pneumococcal conjugate vaccine, 13-valent (PREVNAR 13) 07/18/2017    Pneumococcal polysaccharide vaccine, 23-valent, age 2 years and older (PNEUMOVAX 23) 10/17/2014, 01/14/2021    Pneumococcal, Unspecified 01/14/2021    Zoster vaccine, recombinant, adult (SHINGRIX) 01/24/2021, 12/15/2021    Zoster, live 10/01/2014        Problem List Reviewed  Patient Active Problem List   Diagnosis    Abnormal EKG    Abnormally compliant left middle ear system with type AD tympanogram curve    Adrenal nodule (CMS/HCC)    Agatston coronary artery calcium score less than 100    Age-related nuclear cataract, bilateral    Allergic reaction    Anemia    Angina pectoris (CMS/HCC)    Asymmetric SNHL (sensorineural hearing loss)    Primary hypertension    Chronic fatigue    Class 2 severe obesity due to excess calories with serious comorbidity and body mass index (BMI) of 39.0  to 39.9 in adult (CMS/HCC)    Depression with anxiety    Dry eyes    Chronic deep vein thrombosis (DVT) of lower extremity (CMS/HCC)    Dyslipidemia    Dysphagia    Gait abnormality    Heart palpitations    Mild intermittent asthma without complication    Neck pain    Obstructive sleep apnea    Osteoporosis    Posterior vitreous detachment of both eyes    Presbyacusis    Pulmonary embolism (CMS/HCC)    Renal cyst    Restless legs syndrome    Stage 3b chronic kidney disease (CMS/HCC)    Stress incontinence in female    Tinnitus of both ears    Tubular adenoma of colon    Vitamin D deficiency    Vitreous floaters    Decreased hearing    Acute low back pain    Chronic left-sided low back pain with left-sided sciatica    Mild ascending aorta dilatation (CMS/HCC)    Age-related osteoporosis without current pathological fracture       Past Medical History:   Diagnosis Date    Asymmetrical sensorineural hearing loss 03/09/2018    Asymmetrical left sensorineural hearing loss    Chronic fatigue 03/24/2021    History of chronic fatigue    Chronic kidney disease, stage 3a (CMS/HCC) 02/19/2021    now Stage 3B CKD  Aug 2023    Fall 01/25/2023    Subsequent encounter    GI bleeding 01/25/2023    Gross hematuria 01/25/2023    Impacted cerumen, right ear 02/27/2018    Impacted cerumen of right ear    Tinnitus, bilateral 08/18/2020    Tinnitus, bilateral      Past Surgical History:   Procedure Laterality Date    APPENDECTOMY  03/19/2015    Appendectomy    HYSTERECTOMY  03/19/2015    Hysterectomy    MR HEAD ANGIO WO IV CONTRAST  2/21/2012    MR HEAD ANGIO WO IV CONTRAST [facility]    MR NECK ANGIO WO IV CONTRAST  2/21/2012    MR NECK ANGIO WO IV CONTRAST [facility]    TONSILLECTOMY  03/19/2015    Tonsillectomy      Family History   Problem Relation Name Age of Onset    Cirrhosis Mother      Stroke Father          CVA    Heart failure Father          Congestive    Gout Father      Heart attack Father           Myocardial infarction    Colon cancer Sister        Social History     Tobacco Use    Smoking status: Former     Packs/day: 1.00     Years: 10.00     Additional pack years: 0.00     Total pack years: 10.00     Types: Cigarettes    Smokeless tobacco: Never    Tobacco comments:     Was a smoker for about 10 yrs   Substance Use Topics    Alcohol use: Not Currently        Medications Reviewed  Current Outpatient Medications on File Prior to Visit   Medication Sig Dispense Refill    apixaban (Eliquis) 5 mg tablet Take 1 tablet (5 mg) by mouth every 12 hours.      atorvastatin (Lipitor) 40 mg tablet Take 1 tablet (40 mg) by mouth once daily at bedtime. 90 tablet 3    bisoprolol (Zebeta) 5 mg tablet Take 0.5 tablets (2.5 mg) by mouth once daily. 45 tablet 3    calcium carbonate-vitamin D3 (Oyster Shell) 250 mg-3.125 mcg (125 unit) tablet Take 1 tablet by mouth 2 times a day with meals. 180 tablet 3    dext 70/polycarbophil/peg/NaCl (ARTIFICIAL TEARS SOLUTION OPHT)       DULoxetine (Cymbalta) 30 mg DR capsule Take 1 capsule (30 mg) by mouth 2 times a day. 180 capsule 1    isosorbide mononitrate ER (Imdur) 30 mg 24 hr tablet Take 1 tablet (30 mg) by mouth once daily. 90 tablet 3    lisinopril 10 mg tablet Take 1 tablet (10 mg) by mouth once daily. 90 tablet 3    minoxidil (Loniten) 10 mg tablet Take 1 tablet (10 mg) by mouth once daily.      mv-mn/iron/folic acid/herb 190 (VITAMIN D3 COMPLETE ORAL)       nitroglycerin (Nitrostat) 0.4 mg SL tablet DISSOLVE 1 TABLET UNDER THE TONGUE EVERY 5 MINUTES FOR UP TO 3 DOSES AS NEEDED FOR CHEST PAIN.CALL 911 IF PAIN PERSISTS.      denosumab (Prolia) 60 mg/mL syringe Inject 1 mL (60 mg) under the skin 1 time for 1 dose. 1 mL 0     No current facility-administered medications on file prior to visit.        Review of Systems  Constitutional: Denies fever  HEENT: Denies ST, earache  CVS: Denies Chest pain  Pulmonary: Denies wheezing, SOB  GI: Denies N/V  : Denies  "dysuria  Musculoskeletal:  Denies myalgia  Neuro: Denies focal weakness or numbness.  Skin: Denies Rashes.  *Review of Systems is negative unless otherwise mentioned in HPI or ROS above.      @ADAMABEGIN  /80   Pulse 68   Temp 36.1 °C (97 °F)   Ht 1.575 m (5' 2\")   Wt 97.9 kg (215 lb 12.8 oz)   SpO2 93%   BMI 39.47 kg/m²  reviewed Body mass index is 39.47 kg/m².     Physical Exam  Constitutional: NAD. Afebrile. Resting comfortably.  ENT: Nasal mucosa and oropharynx: moist oral mucosa. Posterior oropharynx nonerythematous. No posterior pharyngeal streaking.  Eyes: PERRLA. EOM intact. No vertical or circular nystagmus.  Lymph: No anterior cervical chain or submandibular lymphadenopathy. No sentinel lymph nodes.  Cardiac: Regular rate & rhythm. No murmur, gallops, or rubs.  Pulmonary: Lungs clear to auscultation bilaterally with good aeration. No wheezes, rhonchi, or rales. Normal WOB.  GI: Soft, Nontender, nondistended. No guarding. Normal BS x4.  : No suprapubic tenderness. No CVA tenderness to percussion.   Musculoskeletal: No peripheral edema.   Skin: No evidence of trauma. No rashes  Neuro: No focal neuro deficits. Normal gait without assistive devices.  Psych: Intact judgement and insight.    Plan    Current Outpatient Medications:     apixaban (Eliquis) 5 mg tablet, Take 1 tablet (5 mg) by mouth every 12 hours., Disp: , Rfl:     atorvastatin (Lipitor) 40 mg tablet, Take 1 tablet (40 mg) by mouth once daily at bedtime., Disp: 90 tablet, Rfl: 3    bisoprolol (Zebeta) 5 mg tablet, Take 0.5 tablets (2.5 mg) by mouth once daily., Disp: 45 tablet, Rfl: 3    calcium carbonate-vitamin D3 (Oyster Shell) 250 mg-3.125 mcg (125 unit) tablet, Take 1 tablet by mouth 2 times a day with meals., Disp: 180 tablet, Rfl: 3    dext 70/polycarbophil/peg/NaCl (ARTIFICIAL TEARS SOLUTION OPHT), , Disp: , Rfl:     DULoxetine (Cymbalta) 30 mg DR capsule, Take 1 capsule (30 mg) by mouth 2 times a day., Disp: 180 capsule, " Rfl: 1    isosorbide mononitrate ER (Imdur) 30 mg 24 hr tablet, Take 1 tablet (30 mg) by mouth once daily., Disp: 90 tablet, Rfl: 3    lisinopril 10 mg tablet, Take 1 tablet (10 mg) by mouth once daily., Disp: 90 tablet, Rfl: 3    minoxidil (Loniten) 10 mg tablet, Take 1 tablet (10 mg) by mouth once daily., Disp: , Rfl:     mv-mn/iron/folic acid/herb 190 (VITAMIN D3 COMPLETE ORAL), , Disp: , Rfl:     nitroglycerin (Nitrostat) 0.4 mg SL tablet, DISSOLVE 1 TABLET UNDER THE TONGUE EVERY 5 MINUTES FOR UP TO 3 DOSES AS NEEDED FOR CHEST PAIN.CALL 911 IF PAIN PERSISTS., Disp: , Rfl:     denosumab (Prolia) 60 mg/mL syringe, Inject 1 mL (60 mg) under the skin 1 time for 1 dose., Disp: 1 mL, Rfl: 0             .Assessment/Plan   Problem List Items Addressed This Visit    None  Visit Diagnoses         Codes    Medicare annual wellness visit, initial    -  Primary Z00.00    Hand dermatitis     L30.9    Relevant Medications    triamcinolone (Kenalog) 0.1 % cream    Routine general medical examination at health care facility     Z00.00

## 2023-12-15 ENCOUNTER — OFFICE VISIT (OUTPATIENT)
Dept: RHEUMATOLOGY | Facility: CLINIC | Age: 73
End: 2023-12-15
Payer: MEDICARE

## 2023-12-15 VITALS — SYSTOLIC BLOOD PRESSURE: 128 MMHG | DIASTOLIC BLOOD PRESSURE: 76 MMHG | BODY MASS INDEX: 38.96 KG/M2 | WEIGHT: 213 LBS

## 2023-12-15 DIAGNOSIS — R76.8 SS-A ANTIBODY POSITIVE: ICD-10-CM

## 2023-12-15 DIAGNOSIS — R76.8 ANA POSITIVE: Primary | ICD-10-CM

## 2023-12-15 DIAGNOSIS — L65.9 ALOPECIA: ICD-10-CM

## 2023-12-15 PROBLEM — R60.0 LOCALIZED EDEMA: Status: ACTIVE | Noted: 2021-11-19

## 2023-12-15 PROBLEM — E83.52 HYPERCALCEMIA: Status: ACTIVE | Noted: 2023-03-02

## 2023-12-15 PROBLEM — I82.409 DEEP VEIN THROMBOSIS (DVT) (MULTI): Status: ACTIVE | Noted: 2023-12-15

## 2023-12-15 PROBLEM — R31.9 HEMATURIA: Status: ACTIVE | Noted: 2022-10-27

## 2023-12-15 PROBLEM — J45.20 MILD INTERMITTENT ASTHMA (HHS-HCC): Status: ACTIVE | Noted: 2023-01-25

## 2023-12-15 PROBLEM — Z78.0 ASYMPTOMATIC POSTMENOPAUSAL STATE: Status: ACTIVE | Noted: 2023-12-15

## 2023-12-15 PROBLEM — R13.19 CERVICAL DYSPHAGIA: Status: ACTIVE | Noted: 2023-12-15

## 2023-12-15 PROBLEM — E88.810 METABOLIC SYNDROME: Status: ACTIVE | Noted: 2023-12-15

## 2023-12-15 PROBLEM — K62.5 BRIGHT RED RECTAL BLEEDING: Status: ACTIVE | Noted: 2019-05-02

## 2023-12-15 PROBLEM — R06.02 SHORTNESS OF BREATH: Status: ACTIVE | Noted: 2021-07-09

## 2023-12-15 PROBLEM — N17.9 AKI (ACUTE KIDNEY INJURY) (CMS-HCC): Status: ACTIVE | Noted: 2023-12-15

## 2023-12-15 PROBLEM — H43.819 POSTERIOR VITREOUS DETACHMENT: Status: ACTIVE | Noted: 2023-12-15

## 2023-12-15 PROBLEM — I13.0 HYPERTENSIVE HEART AND CHRONIC KIDNEY DISEASE WITH HEART FAILURE AND STAGE 1 THROUGH STAGE 4 CHRONIC KIDNEY DISEASE, OR UNSPECIFIED CHRONIC KIDNEY DISEASE (MULTI): Status: ACTIVE | Noted: 2022-09-07

## 2023-12-15 PROBLEM — D35.01 ADENOMA OF RIGHT ADRENAL GLAND: Status: ACTIVE | Noted: 2021-09-07

## 2023-12-15 PROBLEM — I27.20 PULMONARY HYPERTENSION (MULTI): Status: ACTIVE | Noted: 2023-12-15

## 2023-12-15 PROBLEM — Z92.29 H/O HIGH RISK MEDICATION TREATMENT: Status: ACTIVE | Noted: 2023-12-15

## 2023-12-15 PROBLEM — R41.3 MEMORY DIFFICULTY: Status: ACTIVE | Noted: 2023-12-15

## 2023-12-15 PROBLEM — H25.10 NUCLEAR SENILE CATARACT: Status: ACTIVE | Noted: 2023-01-25

## 2023-12-15 PROBLEM — K92.2 GASTROINTESTINAL HEMORRHAGE: Status: ACTIVE | Noted: 2023-01-25

## 2023-12-15 PROBLEM — G43.009 HEADACHE, COMMON MIGRAINE: Status: ACTIVE | Noted: 2023-06-09

## 2023-12-15 PROBLEM — N39.0 URINARY TRACT INFECTION: Status: ACTIVE | Noted: 2023-12-15

## 2023-12-15 PROBLEM — E27.8: Status: ACTIVE | Noted: 2023-12-15

## 2023-12-15 PROBLEM — K63.5 POLYP OF COLON: Status: ACTIVE | Noted: 2022-10-27

## 2023-12-15 PROBLEM — E83.51 HYPOCALCEMIA: Status: ACTIVE | Noted: 2022-10-04

## 2023-12-15 PROBLEM — M54.2 NECK PAIN ON RIGHT SIDE: Status: ACTIVE | Noted: 2023-12-15

## 2023-12-15 PROBLEM — S37.009A INJURY OF KIDNEY: Status: ACTIVE | Noted: 2023-12-15

## 2023-12-15 PROBLEM — I26.99 ACUTE PULMONARY EMBOLISM (MULTI): Status: ACTIVE | Noted: 2023-12-15

## 2023-12-15 PROBLEM — K92.1 GASTROINTESTINAL HEMORRHAGE WITH MELENA: Status: ACTIVE | Noted: 2019-05-02

## 2023-12-15 PROBLEM — Q61.9 CYSTIC KIDNEY DISEASE, UNSPECIFIED: Status: ACTIVE | Noted: 2023-01-25

## 2023-12-15 PROBLEM — N18.30 CHRONIC KIDNEY DISEASE, STAGE 3 (MULTI): Status: ACTIVE | Noted: 2022-11-28

## 2023-12-15 PROBLEM — M54.9 BACK PAIN: Status: ACTIVE | Noted: 2023-12-15

## 2023-12-15 PROBLEM — E83.30 DISORDER OF PHOSPHORUS METABOLISM, UNSPECIFIED: Status: ACTIVE | Noted: 2022-09-07

## 2023-12-15 PROBLEM — D50.9 IRON DEFICIENCY ANEMIA, UNSPECIFIED: Status: ACTIVE | Noted: 2023-03-02

## 2023-12-15 PROBLEM — G89.29 OTHER CHRONIC PAIN: Status: ACTIVE | Noted: 2022-07-25

## 2023-12-15 PROBLEM — E53.8 DEFICIENCY OF OTHER SPECIFIED B GROUP VITAMINS: Status: ACTIVE | Noted: 2023-03-02

## 2023-12-15 PROBLEM — I26.99 ACUTE PULMONARY EMBOLISM WITHOUT ACUTE COR PULMONALE (MULTI): Status: ACTIVE | Noted: 2023-12-15

## 2023-12-15 PROBLEM — R41.3 MEMORY IMPAIRMENT: Status: ACTIVE | Noted: 2023-12-15

## 2023-12-15 PROBLEM — K76.0 FATTY (CHANGE OF) LIVER, NOT ELSEWHERE CLASSIFIED: Status: ACTIVE | Noted: 2023-03-02

## 2023-12-15 PROBLEM — G62.9 POLYNEUROPATHY, UNSPECIFIED: Status: ACTIVE | Noted: 2023-03-02

## 2023-12-15 PROBLEM — D35.01: Status: ACTIVE | Noted: 2022-09-07

## 2023-12-15 PROBLEM — M85.80 OSTEOPENIA: Status: ACTIVE | Noted: 2023-12-15

## 2023-12-15 PROBLEM — R51.9 HEADACHE: Status: ACTIVE | Noted: 2023-12-15

## 2023-12-15 PROBLEM — I50.30 DIASTOLIC HEART FAILURE (MULTI): Status: ACTIVE | Noted: 2023-12-15

## 2023-12-15 PROBLEM — Z86.711 HISTORY OF PULMONARY EMBOLISM: Status: ACTIVE | Noted: 2023-12-15

## 2023-12-15 PROBLEM — M85.80 OTHER SPECIFIED DISORDERS OF BONE DENSITY AND STRUCTURE, UNSPECIFIED SITE: Status: ACTIVE | Noted: 2023-01-04

## 2023-12-15 PROBLEM — E78.00 ELEVATED CHOLESTEROL: Status: ACTIVE | Noted: 2023-12-15

## 2023-12-15 PROBLEM — R05.9 COUGH: Status: ACTIVE | Noted: 2023-12-15

## 2023-12-15 PROCEDURE — 1159F MED LIST DOCD IN RCRD: CPT | Performed by: INTERNAL MEDICINE

## 2023-12-15 PROCEDURE — 3008F BODY MASS INDEX DOCD: CPT | Performed by: INTERNAL MEDICINE

## 2023-12-15 PROCEDURE — 1160F RVW MEDS BY RX/DR IN RCRD: CPT | Performed by: INTERNAL MEDICINE

## 2023-12-15 PROCEDURE — 99214 OFFICE O/P EST MOD 30 MIN: CPT | Mod: PO | Performed by: INTERNAL MEDICINE

## 2023-12-15 PROCEDURE — 1126F AMNT PAIN NOTED NONE PRSNT: CPT | Performed by: INTERNAL MEDICINE

## 2023-12-15 PROCEDURE — 1036F TOBACCO NON-USER: CPT | Performed by: INTERNAL MEDICINE

## 2023-12-15 PROCEDURE — 99214 OFFICE O/P EST MOD 30 MIN: CPT | Performed by: INTERNAL MEDICINE

## 2023-12-15 PROCEDURE — 3078F DIAST BP <80 MM HG: CPT | Performed by: INTERNAL MEDICINE

## 2023-12-15 PROCEDURE — 3074F SYST BP LT 130 MM HG: CPT | Performed by: INTERNAL MEDICINE

## 2023-12-15 RX ORDER — ARTIFICIAL TEARS 1; 2; 3 MG/ML; MG/ML; MG/ML
SOLUTION/ DROPS OPHTHALMIC
COMMUNITY
End: 2023-12-15 | Stop reason: ALTCHOICE

## 2023-12-15 RX ORDER — RIVAROXABAN 15 MG-20MG
KIT ORAL EVERY 12 HOURS
COMMUNITY
Start: 2021-07-09 | End: 2023-12-15 | Stop reason: ALTCHOICE

## 2023-12-15 RX ORDER — TRIAMCINOLONE ACETONIDE 1 MG/ML
LOTION TOPICAL
COMMUNITY
Start: 2023-06-23 | End: 2024-04-04 | Stop reason: WASHOUT

## 2023-12-15 RX ORDER — DIAZEPAM 5 MG/1
TABLET ORAL
COMMUNITY
Start: 2023-05-26 | End: 2024-01-05 | Stop reason: WASHOUT

## 2023-12-15 RX ORDER — PANTOPRAZOLE SODIUM 40 MG/1
TABLET, DELAYED RELEASE ORAL
COMMUNITY
End: 2023-12-15 | Stop reason: ALTCHOICE

## 2023-12-15 RX ORDER — ARTIFICIAL TEARS 1; 2; 3 MG/ML; MG/ML; MG/ML
SOLUTION/ DROPS OPHTHALMIC
COMMUNITY

## 2023-12-15 RX ORDER — CALCIUM CARBONATE 600 MG
TABLET ORAL
COMMUNITY
Start: 2021-02-19 | End: 2023-12-15 | Stop reason: ALTCHOICE

## 2023-12-15 RX ORDER — SEMAGLUTIDE 0.68 MG/ML
INJECTION, SOLUTION SUBCUTANEOUS
COMMUNITY
Start: 2023-04-28 | End: 2023-12-15 | Stop reason: ALTCHOICE

## 2023-12-15 RX ORDER — ACETAMINOPHEN 325 MG/1
650 TABLET ORAL EVERY 6 HOURS PRN
COMMUNITY
Start: 2019-05-06

## 2023-12-15 RX ORDER — CHOLECALCIFEROL (VITAMIN D3) 25 MCG
2000 TABLET ORAL
COMMUNITY
Start: 2022-07-05

## 2023-12-15 RX ORDER — KETOCONAZOLE 20 MG/G
CREAM TOPICAL
COMMUNITY
Start: 2023-09-18

## 2023-12-15 RX ORDER — FERROUS SULFATE 325(65) MG
325 TABLET ORAL
COMMUNITY
End: 2024-06-04 | Stop reason: SDUPTHER

## 2023-12-15 RX ORDER — METHYLPREDNISOLONE 4 MG/1
TABLET ORAL
COMMUNITY
Start: 2021-03-24 | End: 2023-12-15 | Stop reason: ALTCHOICE

## 2023-12-15 RX ORDER — ASPIRIN 81 MG/1
81 TABLET ORAL EVERY OTHER DAY
COMMUNITY
Start: 2018-08-23 | End: 2023-12-15 | Stop reason: ALTCHOICE

## 2023-12-15 RX ORDER — DENOSUMAB 60 MG/ML
INJECTION SUBCUTANEOUS
COMMUNITY

## 2023-12-15 RX ORDER — DIPHENHYDRAMINE HCL 25 MG
CAPSULE ORAL
COMMUNITY
Start: 2019-08-20 | End: 2023-12-15 | Stop reason: ALTCHOICE

## 2023-12-15 NOTE — PROGRESS NOTES
"Recheck  Alopecia  /  + RAVI   Doing well       HPI - No problems since her last OV just \"getting older\"  \"just getting older, like my memory right now - sometimes I just can't find the words\"  She gets back pain sometimes with walking.  She has 2 fingers that hurt.  Pain isn't bad enough to take anything.  No other pain.  No swelling.  AM stiffness until she moves around.  Occ R 4th trigger finger and painful to unlock - not recently.   No CP \"more like my shoulder or my arm or sometimes into my jaw\" - she does see cardiol.    +BATISTA with walking distances or with steps.  No GI other than constipation.   No mouth sores.  +dry mouth at night only - sleeps with mouth open,.  No rash, numbness/tingling, or raynauds.    PE  NAD  Sig hair thinning over top of scalp  Moist MM good salivary pooling  RRR no r/m/g  CTA  No edema  2+ rad and pedal pulses  No synovitis  NT and no pain with ROM throughout  No finger triggering elicited    A/P - +RAVI x mult, +SSA - checked because of alopecia x34 yrs.  No partic s/s of CTD  OP - on prolia from primary  She will restart back exercises  She sees a hand specialist but can also call me if trigger finger sx recur  Reeval 1 yr or sooner PRN    "

## 2023-12-18 ENCOUNTER — APPOINTMENT (OUTPATIENT)
Dept: RHEUMATOLOGY | Facility: CLINIC | Age: 73
End: 2023-12-18
Payer: MEDICARE

## 2023-12-27 ENCOUNTER — CLINICAL SUPPORT (OUTPATIENT)
Dept: AUDIOLOGY | Facility: CLINIC | Age: 73
End: 2023-12-27
Payer: MEDICARE

## 2023-12-27 DIAGNOSIS — H93.13 SUBJECTIVE TINNITUS OF BOTH EARS: ICD-10-CM

## 2023-12-27 DIAGNOSIS — H90.3 SENSORINEURAL HEARING LOSS (SNHL) OF BOTH EARS: Primary | ICD-10-CM

## 2023-12-27 PROCEDURE — 92557 COMPREHENSIVE HEARING TEST: CPT | Performed by: AUDIOLOGIST

## 2023-12-27 PROCEDURE — 92550 TYMPANOMETRY & REFLEX THRESH: CPT | Performed by: AUDIOLOGIST

## 2023-12-27 ASSESSMENT — PAIN - FUNCTIONAL ASSESSMENT: PAIN_FUNCTIONAL_ASSESSMENT: 0-10

## 2023-12-27 ASSESSMENT — ENCOUNTER SYMPTOMS
DEPRESSION: 0
OCCASIONAL FEELINGS OF UNSTEADINESS: 0
LOSS OF SENSATION IN FEET: 0

## 2023-12-27 ASSESSMENT — PAIN SCALES - GENERAL: PAINLEVEL_OUTOF10: 0 - NO PAIN

## 2023-12-27 NOTE — PROGRESS NOTES
Soni Cleveland, age 73 years, is here today for a hearing re-evaluation.     Difficulty hearing - yes, difficulty hearing in noise   Tinnitus - yes, bilaterally for the past several years  Excessive noise exposure - no  Chronic ear infections - yes, childhood  Ear pain - no  Ear drainage - no  Past ear surgery - no  Vertigo - yes, severe episode in 1976, more mild recently (onset - sudden, duration - seconds, aggravated by movement  Past hearing aid use - no  Family history - no    Appointment time: 3-3:30    Otoscopy revealed clear ear canals with visual inspection of the tympanic membranes bilaterally.    Behavioral hearing evaluation revealed slight asymmetry with the left ear being worse:  Right ear - normal hearing sensitivity 250-2000 Hz sloping to mild/moderate sensorineural hearing loss 1770-6383 Hz  Left ear - normal hearing sensitivity 250-2000 Hz sloping to mild/moderately-severe sensorineural hearing loss 8981-3015 Hz  *slight decrease bilaterally since 2021    Speech reception thresholds obtained at a level consistent with pure tone thresholds bilaterally.    Word discrimination excellent (96%) bilaterally.    Tympanometry revealed:  Right ear - Type A, normal middle ear function  Left ear - Type Ad, eardrum hypermobility with normal ear canal volume and normal middle ear pressure    Ipsilateral/contralateral acoustic reflexes present 500-4000 Hz bilaterally.      Recommendations:  1) Re-evaluate hearing annually, or sooner, if a change in hearing is noted  2) Hearing aids for both ears if struggling to hear  3) Use of fan or soothing noise during times of quiet (bed time) to help mask tinnitus

## 2024-01-02 ENCOUNTER — LAB (OUTPATIENT)
Dept: LAB | Facility: LAB | Age: 74
End: 2024-01-02
Payer: MEDICARE

## 2024-01-02 DIAGNOSIS — E78.5 BORDERLINE HYPERLIPIDEMIA: ICD-10-CM

## 2024-01-02 DIAGNOSIS — E55.9 VITAMIN D INSUFFICIENCY: ICD-10-CM

## 2024-01-02 DIAGNOSIS — Z00.00 ROUTINE GENERAL MEDICAL EXAMINATION AT A HEALTH CARE FACILITY: ICD-10-CM

## 2024-01-02 DIAGNOSIS — I10 PRIMARY HYPERTENSION: ICD-10-CM

## 2024-01-02 DIAGNOSIS — M81.0 AGE RELATED OSTEOPOROSIS, UNSPECIFIED PATHOLOGICAL FRACTURE PRESENCE: ICD-10-CM

## 2024-01-02 LAB
ANION GAP SERPL CALC-SCNC: 15 MMOL/L (ref 10–20)
BUN SERPL-MCNC: 18 MG/DL (ref 6–23)
CALCIUM SERPL-MCNC: 9.9 MG/DL (ref 8.6–10.3)
CHLORIDE SERPL-SCNC: 101 MMOL/L (ref 98–107)
CO2 SERPL-SCNC: 30 MMOL/L (ref 21–32)
CREAT SERPL-MCNC: 1.36 MG/DL (ref 0.5–1.05)
ERYTHROCYTE [DISTWIDTH] IN BLOOD BY AUTOMATED COUNT: 13.5 % (ref 11.5–14.5)
GFR SERPL CREATININE-BSD FRML MDRD: 42 ML/MIN/1.73M*2
GLUCOSE SERPL-MCNC: 170 MG/DL (ref 74–99)
HCT VFR BLD AUTO: 44.9 % (ref 36–46)
HGB BLD-MCNC: 13.9 G/DL (ref 12–16)
MAGNESIUM SERPL-MCNC: 1.9 MG/DL (ref 1.6–2.4)
MCH RBC QN AUTO: 28.8 PG (ref 26–34)
MCHC RBC AUTO-ENTMCNC: 31 G/DL (ref 32–36)
MCV RBC AUTO: 93 FL (ref 80–100)
NRBC BLD-RTO: 0 /100 WBCS (ref 0–0)
PHOSPHATE SERPL-MCNC: 3.7 MG/DL (ref 2.5–4.9)
PLATELET # BLD AUTO: 247 X10*3/UL (ref 150–450)
POTASSIUM SERPL-SCNC: 4.5 MMOL/L (ref 3.5–5.3)
RBC # BLD AUTO: 4.83 X10*6/UL (ref 4–5.2)
SODIUM SERPL-SCNC: 141 MMOL/L (ref 136–145)
TSH SERPL-ACNC: 1.27 MIU/L (ref 0.44–3.98)
WBC # BLD AUTO: 12.4 X10*3/UL (ref 4.4–11.3)

## 2024-01-02 PROCEDURE — 36415 COLL VENOUS BLD VENIPUNCTURE: CPT

## 2024-01-02 PROCEDURE — 83735 ASSAY OF MAGNESIUM: CPT

## 2024-01-02 PROCEDURE — 84100 ASSAY OF PHOSPHORUS: CPT

## 2024-01-02 PROCEDURE — 85027 COMPLETE CBC AUTOMATED: CPT

## 2024-01-02 PROCEDURE — 82306 VITAMIN D 25 HYDROXY: CPT

## 2024-01-02 PROCEDURE — 80048 BASIC METABOLIC PNL TOTAL CA: CPT

## 2024-01-02 PROCEDURE — 84443 ASSAY THYROID STIM HORMONE: CPT

## 2024-01-03 LAB — 25(OH)D3 SERPL-MCNC: 65 NG/ML (ref 30–100)

## 2024-01-05 ENCOUNTER — OFFICE VISIT (OUTPATIENT)
Dept: OTOLARYNGOLOGY | Facility: CLINIC | Age: 74
End: 2024-01-05
Payer: MEDICARE

## 2024-01-05 DIAGNOSIS — M81.0 AGE-RELATED OSTEOPOROSIS WITHOUT CURRENT PATHOLOGICAL FRACTURE: Primary | ICD-10-CM

## 2024-01-05 DIAGNOSIS — H91.93 DECREASED HEARING OF BOTH EARS: Primary | ICD-10-CM

## 2024-01-05 PROCEDURE — 3008F BODY MASS INDEX DOCD: CPT | Performed by: OTOLARYNGOLOGY

## 2024-01-05 PROCEDURE — 99213 OFFICE O/P EST LOW 20 MIN: CPT | Performed by: OTOLARYNGOLOGY

## 2024-01-05 PROCEDURE — 1036F TOBACCO NON-USER: CPT | Performed by: OTOLARYNGOLOGY

## 2024-01-05 PROCEDURE — 1126F AMNT PAIN NOTED NONE PRSNT: CPT | Performed by: OTOLARYNGOLOGY

## 2024-01-05 RX ORDER — FAMOTIDINE 10 MG/ML
20 INJECTION INTRAVENOUS ONCE AS NEEDED
Status: CANCELLED | OUTPATIENT
Start: 2024-01-05

## 2024-01-05 RX ORDER — ALBUTEROL SULFATE 0.83 MG/ML
3 SOLUTION RESPIRATORY (INHALATION) AS NEEDED
Status: CANCELLED | OUTPATIENT
Start: 2024-01-05

## 2024-01-05 RX ORDER — DIPHENHYDRAMINE HYDROCHLORIDE 50 MG/ML
50 INJECTION INTRAMUSCULAR; INTRAVENOUS AS NEEDED
Status: CANCELLED | OUTPATIENT
Start: 2024-01-05

## 2024-01-05 RX ORDER — EPINEPHRINE 0.3 MG/.3ML
0.3 INJECTION SUBCUTANEOUS EVERY 5 MIN PRN
Status: CANCELLED | OUTPATIENT
Start: 2024-01-05

## 2024-01-05 NOTE — PROGRESS NOTES
Chief Complaint     follow up tinnitus      History of Present Illness    01.05.2023: She had her hearing test done.  It shows bilateral presbycusis.  Left ear is worse than the right ear at 6 and 8 kHz, 15 dB difference.    Recommendations:  1-follow-up in 1 year with hearing test  2-consider using hearing aids  _________________________________________________________________    11.03.2023: She has decreased hearing bilaterally, more on the left side. She has severely annoying tinnitus.     Plan:  1- Hearing test  2- information regarding lenire was provided    _________________________________________________________________    05.05.2023: Tinnitus makes her crazy at night. During the day she can manage it. It is like cricket sounds.  TMs look intact.     Recommendations:  1- hearing test  2- consider lenire device     ____________________________________________________________________________________________        03.08.2021: Ms. Cleveland is a 71 yo F. She is referred by Dr. John for tinnitus in ears. It has been going on for years.   Secondly, she has itching in her ear canals.     ____________________________________________________________________________________________     03.24.2021: She has bilateral presbyacusis and tinnitus. She had her hearing test today. Compared to her hearing test which was done ~3 years ago, there is not much progress in her hearing loss. It was explained to her that tinnitus is a benign condition and may happen with age related hearing loss. Stress and focusing on tinnitus can make it psychologically worse. Some of her current medications may have caused or may have increased her tinnitus (aspirin, lisonopril and/or atorvastatin). I recommend to ask Dr. John if it is possible to stop/pause some of these medications for some time or not.      Recommendations:  1- avoid focusing on your tinnitus  2- follow up in 3 years      Review of Systems  Below is a copy of her  initial ROS, she does not have fever today.     Constitutional: no fever.   ENMT: difficulty with hearing~and~tinnitus.      Active Problems     · Abnormal EKG (794.31) (R94.31)   · Abnormally compliant left middle ear system with type AD tympanogram curve (385.89)  (R94.120)   · Acute low back pain (724.2) (M54.50)   · Acute pain of left shoulder (719.41) (M25.512)   · Adrenal nodule (255.8) (E27.8)   · Agatston coronary artery calcium score less than 100 (793.2) (R93.1)   · Age-related nuclear cataract, bilateral (366.16) (H25.13)   · Allergic reaction (995.3) (T78.40XA)   · RAVI positive (795.79) (R76.8)   · Anemia (285.9) (D64.9)   · Angina pectoris (413.9) (I20.9)   · Angina pectoris   · Anxiety (300.00) (F41.9)   · Asymmetric SNHL (sensorineural hearing loss) (389.16) (H90.3)   · Athscl heart disease of native coronary artery w/o ang pctrs (414.01) (I25.10)   · Benign essential hypertension (401.1) (I10)   · BMI 39.0-39.9,adult (V85.39) (Z68.39)   · Breast cancer screening (V76.10) (Z12.39)   · Chronic fatigue (780.79) (R53.82)   · Chronic left-sided low back pain with left-sided sciatica (724.2,724.3,338.29)  (M54.42,G89.29)   · CKD (chronic kidney disease) (585.9) (N18.9)   · Class 2 severe obesity due to excess calories with serious comorbidity and body mass  index (BMI) of 38.0 to 38.9 in adult (278.01,V85.38) (E66.01,Z68.38)   · Class 2 severe obesity due to excess calories with serious comorbidity and body mass  index (BMI) of 39.0 to 39.9 in adult (278.01,V85.39) (E66.01,Z68.39)   · Class 3 severe obesity due to excess calories with serious comorbidity and body mass  index (BMI) of 40.0 to 44.9 in adult (278.01,V85.41) (E66.01,Z68.41)   · Colon cancer screening (V76.51) (Z12.11)   · Coronary artery calcification (414.00,414.4) (I25.10,I25.84)   · Coronary atherosclerosis (414.00) (I25.10)   · Decreased hearing (389.9) (H91.90)   · Depression (311) (F32.A)   · Depression with anxiety (300.4) (F41.8)   ·  Dry eyes (375.15) (H04.123)   · DVT (deep venous thrombosis) (453.40) (I82.409)   · Dyslipidemia (272.4) (E78.5)   · Dysphagia (787.20) (R13.10)   · Encounter for colorectal cancer screening (V76.51,V76.41) (Z12.11,Z12.12)   · Encounter for immunization (V03.89) (Z23)   · Encounter for preventive health examination (V70.0) (Z00.00)   · Exertional shortness of breath (786.05) (R06.02)   · Fall, subsequent encounter (V58.89,E888.9) (W19.XXXD)   · Gait abnormality (781.2) (R26.9)   · GI bleeding (578.9) (K92.2)   · GI bleeding (578.9) (K92.2)   · Gross hematuria (599.71) (R31.0)   · Hair loss (704.00) (L65.9)   · Heart palpitations (785.1) (R00.2)   · History of long-term treatment with high-risk medication (V58.69) (Z79.899)   · Left elbow pain (719.42) (M25.522)   · Mild intermittent asthma without complication (493.90) (J45.20)   · Neck pain (723.1) (M54.2)   · Obesity (278.00) (E66.9)   · Obstructive sleep apnea (327.23) (G47.33)   · Osteoporosis (733.00) (M81.0)   · Plantar warts (078.12) (B07.0)   · Posterior vitreous detachment of both eyes (379.21) (H43.813)   · Postmenopausal status (V49.81) (Z78.0)   · Presbyacusis (388.01) (H91.10)   · Pulmonary embolism (415.19) (I26.99)   · Pulmonary hypertension, mild (416.8) (I27.20)   · Pulmonary hypertension, mild   · Renal cyst (753.10) (N28.1)   · Restless legs syndrome (333.94) (G25.81)   · Stage 1 chronic kidney disease (585.1) (N18.1)   · Stage 3a chronic kidney disease (585.3) (N18.31)   · Stage 3b chronic kidney disease (585.3) (N18.32)   · Stress incontinence in female (625.6) (N39.3)   · Tinnitus (388.30) (H93.19)   · Tinnitus of both ears (388.30) (H93.13)   · Tinnitus, bilateral (388.30) (H93.13)   · Tubular adenoma of colon (211.3) (D12.6)   · Vitamin D deficiency (268.9) (E55.9)   · Vitreous floaters (379.24) (H43.399)        Vitamin D deficiency (268.9) (E55.9)       Osteoporosis (733.00) (M81.0)       Dyslipidemia (272.4) (E78.5)       Chronic fatigue  (780.79) (R53.82)           Past Medical History     · History of Acute pain of left shoulder (719.41) (M25.512)   · Resolved Date: 24 Mar 2021   · History of Acute pain of left shoulder (719.41) (M25.512)   · Resolved Date: 02 Aug 2021   · History of Ankle pain (719.47) (M25.579)   · Resolved Date: 01 Jun 2016   · History of Anxiety (300.00) (F41.9)   · Resolved Date: 01 Jun 2016   · History of Asymmetrical left sensorineural hearing loss   · Resolved Date: 24 Mar 2021   · History of Benign essential hypertension (401.1) (I10)   · Resolved Date: 20 Oct 2015   · History of Hand pain (729.5) (M79.643)   · Resolved Date: 01 Jun 2016   · History of chest pain (V13.89) (Z87.898)   · Resolved Date: 01 Jun 2016   · History of chronic fatigue (V13.89) (Z87.898)   · Resolved Date: 19 Feb 2021   · History of chronic fatigue (V13.89) (Z87.898)   · Resolved Date: 24 Mar 2021   · History of dizziness (V13.89) (Z87.898)   · Resolved Date: 01 Jun 2016   · History of fatigue (V13.89) (Z87.898)   · Resolved Date: 01 Jun 2016   · History of neck pain (V13.59) (Z87.39)   · Resolved Date: 07 Jul 2022   · History of neck pain (V13.59) (Z87.39)   · Resolved Date: 07 Jul 2022   · History of obesity (V12.29) (Z86.39)   · Resolved Date: 05 May 2021   · History of obesity (V12.29) (Z86.39)   · Resolved Date: 02 Aug 2021   · History of tinea corporis (V12.09) (Z86.19)   · Resolved Date: 01 Jun 2016   · History of vertigo (V12.49) (Z87.898)   · Resolved Date: 01 Jun 2016   · History of Impacted cerumen of right ear (380.4) (H61.21)   · Resolved Date: 24 Mar 2021   · History of Intertrigo (695.89) (L30.4)   · Resolved Date: 01 Jun 2016   · History of Need for hepatitis C screening test (V73.89) (Z11.59)   · Resolved Date: 26 Jul 2018   · History of No significant past medical history   · History of SOB (shortness of breath) on exertion (786.05) (R06.02)   · Resolved Date: 01 Jun 2016   · History of Stage 3a chronic kidney disease (585.3)  (N18.31)   · Resolved Date: 05 May 2021   · History of Stage 3b chronic kidney disease (585.3) (N18.32)   · Resolved Date: 05 May 2021   · History of Tinnitus, bilateral (388.30) (H93.13)   · Resolved Date: 19 Feb 2021     Surgical History     · History of Appendectomy   · History of Hysterectomy   · History of Tonsillectomy     Family History     · Family history of hepatic cirrhosis (V18.59) (Z83.79)     · Family history of cerebrovascular accident (V17.1) (Z82.3)   · Family history of congestive heart failure (V17.49) (Z82.49)   · Family history of gout (V18.19) (Z82.69)   · Family history of myocardial infarction (V17.3) (Z82.49)     · Family history of colon cancer (V16.0) (Z80.0)     Social History     · Denied: History of Alcohol   · H/O nicotine dependence (V15.82) (Z87.891)   · Never Used Drugs     Allergies     · Penicillins   Recorded By: Oksana Causey; 5/7/2013 4:34:28 PM     Current Meds     Medication Name Instruction   Artificial Tears SOLN     Atorvastatin Calcium 40 MG Oral Tablet Take 1 tablet daily   Bisoprolol Fumarate 5 MG Oral Tablet TAKE 0.5 TABLET Daily   DULoxetine HCl - 30 MG Oral Capsule Delayed Release Particles TAKE 1 CAPSULE Daily   Eliquis 5 MG Oral Tablet TAKE 1 TABLET Every twelve hours   Isosorbide Mononitrate ER 30 MG Oral Tablet Extended Release 24 Hour TAKE 1 TABLET DAILY.   Lisinopril 10 MG Oral Tablet Take 1 tablet daily   Nitroglycerin 0.4 MG Sublingual Tablet Sublingual DISSOLVE 1 TABLET UNDER THE TONGUE EVERY 5 MINUTES FOR UP TO 3 DOSES AS NEEDED FOR CHEST PAIN.CALL 911 IF PAIN PERSISTS.   Ozempic (0.25 or 0.5 MG/DOSE) 2 MG/1.5ML SOPN     Prolia 60 MG/ML Subcutaneous Solution Prefilled Syringe INJECT 60 MG Once   Vitamin D3 Complete Oral Tablet        Physical Exam  (Old exam)  General appearance: Healthy-appearing, well-nourished, well groomed, in no acute distress.      Head and Face: Atraumatic with no masses, lesions, or scarring.      Facial strength: Normal strength and  symmetry, no synkinesis or facial tic.      Eyes: Conjunctivas look non-hyperemic bilaterally     Ears: Ear canals look normal. Mild wax bilaterally. Tympanic membranes look intact, no hyperemia, fluid or retraction. Mild sclerosis at left posteroinferiorly. She may have had effusion when she was a little kid.     Nose: No purulent discharge. (previous exam)     Throat: No tonsil hypertrophy. No hyperemia. (previous exam)     Pulmonary: Normal respiratory effort.      Lymphatic No palpable pathologic lymph nodes at neck. (previous exam)     Neurological/Psychiatric Orientation to person, place, and time: Normal.   Mood and affect: Normal.      Extremities: No clubbing.      Skin: No skin lesions were noted at face or neck      Procedure  03.24.2021: Hearing Test  Appointment time: 9:10-9:40     Otoscopy revealed clear ear canals with visual inspection of the tympanic membranes bilaterally.     Behavioral hearing evaluation revealed slight asymmetry with the left ear being worse:  Right ear - normal hearing sensitivity 250-2000 Hz sloping to mild sensorineural hearing loss  Left ear - normal hearing sensitivity 250-2000 Hz sloping to moderate sensorineural hearing loss  *slight decrease bilaterally 8402-6271 Hz ince 2018     Speech reception thresholds obtained at a level consistent with pure tone thresholds bilaterally.     Word discrimination excellent bilaterally.     Tympanometry revealed:  Right ear - Type A, normal middle ear function  Left ear - Type Ad, eardrum hypermobility with normal ear canal volume and middle ear pressure     Ipsilateral/contralateral acoustic reflexes present 500-4000 Hz bilaterally.        Recommendations:  1) Re-evaluate hearing annually, or sooner, if a change in hearing is noted  2) Hearing aids for both ears if struggling to hear  3) Use of fan or soothing noise during times of quiet (bed time) to help mask tinnitus     03.07.2018: Hearing test     Otoscopy revealed clear ear  canals with visual inspection of the tympanic membranes bilaterally.     Behavioral hearing evaluation revealed slight asymmetry with the left ear being worse:  Right ear - normal hearing sensitivity 250-2000 Hz sloping to a mild high frequency sensorineural hearing loss  Left ear - normal hearing sensitivity 250-2000 Hz sloping to a moderate high frequency sensorineural hearing loss     Speech reception thresholds obtained at a level consistent with pure tone thresholds bilaterally.     Word discrimination excellent bilaterally.     Tympanometry revealed normal middle ear function bilaterally.     Ipsilateral acoustic reflexes present 500-4000 Hz bilaterally.    Diagnoses/Problems     · Tinnitus of both ears (388.30) (H93.13)     Assessment and Plan:     01.05.2023: She had her hearing test done.  It shows bilateral presbycusis.  Left ear is worse than the right ear at 6 and 8 kHz, 15 dB difference.    Recommendations:  1-follow-up in 1 year with hearing test  2-consider using hearing aids  _________________________________________________________________    11.03.2023: She has decreased hearing bilaterally, more on the left side. She has severely annoying tinnitus.     Plan:  1- Hearing test  2- information regarding nguyễn was provided    _________________________________________________________________    05.05.2023: Tinnitus makes her crazy at night. During the day she can manage it. It is like cricket sounds.  TMs look intact.     Recommendations:  1- hearing test  2- consider lenire device     ____________________________________________________________________________________________     03.24.2021: She has bilateral presbyacusis and tinnitus. She had her hearing test today. Compared to her hearing test which was done ~3 years ago, there is not much progress in her hearing loss. It was explained to her that tinnitus is a benign condition and may happen with age related hearing loss. Stress and focusing on  tinnitus can make it psychologically worse. Some of her current medications may have caused or may have increased her tinnitus (aspirin, lisonopril and/or atorvastatin). I recommend to ask Dr. John if it is possible to stop/pause some of these medications for some time or not.      Recommendations:  1- avoid focusing on your tinnitus  2- follow up in 3 years

## 2024-01-08 ENCOUNTER — OFFICE VISIT (OUTPATIENT)
Dept: HEMATOLOGY/ONCOLOGY | Facility: HOSPITAL | Age: 74
End: 2024-01-08
Payer: MEDICARE

## 2024-01-08 ENCOUNTER — LAB (OUTPATIENT)
Dept: LAB | Facility: LAB | Age: 74
End: 2024-01-08
Payer: MEDICARE

## 2024-01-08 ENCOUNTER — INFUSION (OUTPATIENT)
Dept: HEMATOLOGY/ONCOLOGY | Facility: HOSPITAL | Age: 74
End: 2024-01-08
Payer: MEDICARE

## 2024-01-08 VITALS
DIASTOLIC BLOOD PRESSURE: 75 MMHG | TEMPERATURE: 95.4 F | HEART RATE: 93 BPM | WEIGHT: 214.18 LBS | RESPIRATION RATE: 18 BRPM | BODY MASS INDEX: 39.41 KG/M2 | HEIGHT: 62 IN | SYSTOLIC BLOOD PRESSURE: 109 MMHG | OXYGEN SATURATION: 95 %

## 2024-01-08 DIAGNOSIS — D64.9 ANEMIA, UNSPECIFIED TYPE: ICD-10-CM

## 2024-01-08 DIAGNOSIS — E61.1 IRON DEFICIENCY: ICD-10-CM

## 2024-01-08 DIAGNOSIS — D64.9 ANEMIA, UNSPECIFIED TYPE: Primary | ICD-10-CM

## 2024-01-08 DIAGNOSIS — M81.0 AGE-RELATED OSTEOPOROSIS WITHOUT CURRENT PATHOLOGICAL FRACTURE: ICD-10-CM

## 2024-01-08 DIAGNOSIS — I26.99 PULMONARY EMBOLISM, OTHER, UNSPECIFIED CHRONICITY, UNSPECIFIED WHETHER ACUTE COR PULMONALE PRESENT (MULTI): ICD-10-CM

## 2024-01-08 DIAGNOSIS — K63.5 POLYP OF COLON, UNSPECIFIED PART OF COLON, UNSPECIFIED TYPE: ICD-10-CM

## 2024-01-08 DIAGNOSIS — I10 HYPERTENSION, UNSPECIFIED TYPE: ICD-10-CM

## 2024-01-08 LAB
ALBUMIN SERPL BCP-MCNC: 4 G/DL (ref 3.4–5)
ALP SERPL-CCNC: 90 U/L (ref 33–136)
ALT SERPL W P-5'-P-CCNC: 20 U/L (ref 7–45)
ANION GAP SERPL CALC-SCNC: 11 MMOL/L (ref 10–20)
AST SERPL W P-5'-P-CCNC: 19 U/L (ref 9–39)
BASOPHILS # BLD AUTO: 0.05 X10*3/UL (ref 0–0.1)
BASOPHILS NFR BLD AUTO: 0.5 %
BILIRUB SERPL-MCNC: 0.4 MG/DL (ref 0–1.2)
BUN SERPL-MCNC: 16 MG/DL (ref 6–23)
CA-I BLD-SCNC: 1.25 MMOL/L (ref 1.1–1.33)
CALCIUM SERPL-MCNC: 9.3 MG/DL (ref 8.6–10.3)
CHLORIDE SERPL-SCNC: 105 MMOL/L (ref 98–107)
CO2 SERPL-SCNC: 32 MMOL/L (ref 21–32)
CREAT SERPL-MCNC: 1.19 MG/DL (ref 0.5–1.05)
EGFRCR SERPLBLD CKD-EPI 2021: 49 ML/MIN/1.73M*2
EOSINOPHIL # BLD AUTO: 0.21 X10*3/UL (ref 0–0.4)
EOSINOPHIL NFR BLD AUTO: 2.2 %
ERYTHROCYTE [DISTWIDTH] IN BLOOD BY AUTOMATED COUNT: 13.3 % (ref 11.5–14.5)
FERRITIN SERPL-MCNC: 256 NG/ML (ref 8–150)
GLUCOSE SERPL-MCNC: 104 MG/DL (ref 74–99)
HCT VFR BLD AUTO: 42.9 % (ref 36–46)
HGB BLD-MCNC: 13.1 G/DL (ref 12–16)
IMM GRANULOCYTES # BLD AUTO: 0.03 X10*3/UL (ref 0–0.5)
IMM GRANULOCYTES NFR BLD AUTO: 0.3 % (ref 0–0.9)
IRON SATN MFR SERPL: 29 % (ref 25–45)
IRON SERPL-MCNC: 104 UG/DL (ref 35–150)
LYMPHOCYTES # BLD AUTO: 1.69 X10*3/UL (ref 0.8–3)
LYMPHOCYTES NFR BLD AUTO: 17.9 %
MCH RBC QN AUTO: 28.8 PG (ref 26–34)
MCHC RBC AUTO-ENTMCNC: 30.5 G/DL (ref 32–36)
MCV RBC AUTO: 94 FL (ref 80–100)
MONOCYTES # BLD AUTO: 0.86 X10*3/UL (ref 0.05–0.8)
MONOCYTES NFR BLD AUTO: 9.1 %
NEUTROPHILS # BLD AUTO: 6.6 X10*3/UL (ref 1.6–5.5)
NEUTROPHILS NFR BLD AUTO: 70 %
NRBC BLD-RTO: 0 /100 WBCS (ref 0–0)
PLATELET # BLD AUTO: 246 X10*3/UL (ref 150–450)
POTASSIUM SERPL-SCNC: 3.8 MMOL/L (ref 3.5–5.3)
PROT SERPL-MCNC: 6.4 G/DL (ref 6.4–8.2)
RBC # BLD AUTO: 4.55 X10*6/UL (ref 4–5.2)
SODIUM SERPL-SCNC: 144 MMOL/L (ref 136–145)
TIBC SERPL-MCNC: 358 UG/DL (ref 240–445)
UIBC SERPL-MCNC: 254 UG/DL (ref 110–370)
WBC # BLD AUTO: 9.4 X10*3/UL (ref 4.4–11.3)

## 2024-01-08 PROCEDURE — 1160F RVW MEDS BY RX/DR IN RCRD: CPT | Performed by: INTERNAL MEDICINE

## 2024-01-08 PROCEDURE — 82330 ASSAY OF CALCIUM: CPT

## 2024-01-08 PROCEDURE — 36415 COLL VENOUS BLD VENIPUNCTURE: CPT

## 2024-01-08 PROCEDURE — 1126F AMNT PAIN NOTED NONE PRSNT: CPT | Performed by: INTERNAL MEDICINE

## 2024-01-08 PROCEDURE — 80053 COMPREHEN METABOLIC PANEL: CPT

## 2024-01-08 PROCEDURE — 99214 OFFICE O/P EST MOD 30 MIN: CPT | Performed by: INTERNAL MEDICINE

## 2024-01-08 PROCEDURE — 85025 COMPLETE CBC W/AUTO DIFF WBC: CPT

## 2024-01-08 PROCEDURE — 3008F BODY MASS INDEX DOCD: CPT | Performed by: INTERNAL MEDICINE

## 2024-01-08 PROCEDURE — 1159F MED LIST DOCD IN RCRD: CPT | Performed by: INTERNAL MEDICINE

## 2024-01-08 PROCEDURE — 96372 THER/PROPH/DIAG INJ SC/IM: CPT

## 2024-01-08 PROCEDURE — 82728 ASSAY OF FERRITIN: CPT

## 2024-01-08 PROCEDURE — 3074F SYST BP LT 130 MM HG: CPT | Performed by: INTERNAL MEDICINE

## 2024-01-08 PROCEDURE — 3078F DIAST BP <80 MM HG: CPT | Performed by: INTERNAL MEDICINE

## 2024-01-08 PROCEDURE — 2500000004 HC RX 250 GENERAL PHARMACY W/ HCPCS (ALT 636 FOR OP/ED): Mod: JZ | Performed by: PHYSICIAN ASSISTANT

## 2024-01-08 PROCEDURE — 96372 THER/PROPH/DIAG INJ SC/IM: CPT | Performed by: PHYSICIAN ASSISTANT

## 2024-01-08 PROCEDURE — 83550 IRON BINDING TEST: CPT

## 2024-01-08 PROCEDURE — 1036F TOBACCO NON-USER: CPT | Performed by: INTERNAL MEDICINE

## 2024-01-08 PROCEDURE — 83540 ASSAY OF IRON: CPT

## 2024-01-08 RX ORDER — FAMOTIDINE 10 MG/ML
20 INJECTION INTRAVENOUS ONCE AS NEEDED
OUTPATIENT
Start: 2024-07-06

## 2024-01-08 RX ORDER — DIPHENHYDRAMINE HYDROCHLORIDE 50 MG/ML
50 INJECTION INTRAMUSCULAR; INTRAVENOUS AS NEEDED
Status: DISCONTINUED | OUTPATIENT
Start: 2024-01-08 | End: 2024-01-08 | Stop reason: HOSPADM

## 2024-01-08 RX ORDER — ALBUTEROL SULFATE 0.83 MG/ML
3 SOLUTION RESPIRATORY (INHALATION) AS NEEDED
OUTPATIENT
Start: 2024-07-06

## 2024-01-08 RX ORDER — FAMOTIDINE 10 MG/ML
20 INJECTION INTRAVENOUS ONCE AS NEEDED
Status: DISCONTINUED | OUTPATIENT
Start: 2024-01-08 | End: 2024-01-08 | Stop reason: HOSPADM

## 2024-01-08 RX ORDER — EPINEPHRINE 0.3 MG/.3ML
0.3 INJECTION SUBCUTANEOUS EVERY 5 MIN PRN
Status: DISCONTINUED | OUTPATIENT
Start: 2024-01-08 | End: 2024-01-08 | Stop reason: HOSPADM

## 2024-01-08 RX ORDER — DIPHENHYDRAMINE HYDROCHLORIDE 50 MG/ML
50 INJECTION INTRAMUSCULAR; INTRAVENOUS AS NEEDED
OUTPATIENT
Start: 2024-07-06

## 2024-01-08 RX ORDER — ALBUTEROL SULFATE 0.83 MG/ML
3 SOLUTION RESPIRATORY (INHALATION) AS NEEDED
Status: DISCONTINUED | OUTPATIENT
Start: 2024-01-08 | End: 2024-01-08 | Stop reason: HOSPADM

## 2024-01-08 RX ORDER — EPINEPHRINE 0.3 MG/.3ML
0.3 INJECTION SUBCUTANEOUS EVERY 5 MIN PRN
OUTPATIENT
Start: 2024-07-06

## 2024-01-08 RX ORDER — MINOXIDIL 2 %
SOLUTION, NON-ORAL TOPICAL 2 TIMES DAILY
COMMUNITY

## 2024-01-08 RX ADMIN — DENOSUMAB 60 MG: 60 INJECTION SUBCUTANEOUS at 10:54

## 2024-01-08 ASSESSMENT — ENCOUNTER SYMPTOMS
DEPRESSION: 0
OCCASIONAL FEELINGS OF UNSTEADINESS: 0
LOSS OF SENSATION IN FEET: 0

## 2024-01-08 ASSESSMENT — PAIN SCALES - GENERAL: PAINLEVEL: 0-NO PAIN

## 2024-01-08 ASSESSMENT — PATIENT HEALTH QUESTIONNAIRE - PHQ9
2. FEELING DOWN, DEPRESSED OR HOPELESS: NOT AT ALL
SUM OF ALL RESPONSES TO PHQ9 QUESTIONS 1 & 2: 0
1. LITTLE INTEREST OR PLEASURE IN DOING THINGS: NOT AT ALL

## 2024-01-08 ASSESSMENT — COLUMBIA-SUICIDE SEVERITY RATING SCALE - C-SSRS
6. HAVE YOU EVER DONE ANYTHING, STARTED TO DO ANYTHING, OR PREPARED TO DO ANYTHING TO END YOUR LIFE?: NO
2. HAVE YOU ACTUALLY HAD ANY THOUGHTS OF KILLING YOURSELF?: NO
1. IN THE PAST MONTH, HAVE YOU WISHED YOU WERE DEAD OR WISHED YOU COULD GO TO SLEEP AND NOT WAKE UP?: NO

## 2024-01-08 NOTE — PROGRESS NOTES
There is no need to repeat CMP/Ionized Calcium today per Vanessa Dsouza.  The pt had BMP/vitamin D level; CBC; mag/phos drawn on Jan. 2, 2024; all parameters met for prolia injection today.  Ca 9.9; CrCl 57.42

## 2024-01-08 NOTE — PROGRESS NOTES
Interval History:    Patient is a 73 -year-old white woman referred for anemia, hemoglobin down to  8.8 July/August 2021 but actually normal 10/2021 and subsequently.   7/2021 patient had bilateral pulmonary emboli without cor pulmonale with symptoms of shortness of breath (unprovoked  thrombosis), acute kidney injury felt secondary to contrast, UTI, also possible pneumonia, dysphagia with surgery noting no intervention needed (symptoms subsequently resolved) , recommended outpatient GI follow-up (patient decided not to follow-up with GI), recommended iron; patient was treated with a  heparin drip and discharged with Xarelto which was later switched to Eliquis due to cost , also maintained on aspirin at the recommendation of PCP; however, cardiology discontinued her aspirin 1/31/2022.  Patient had a colonoscopy and EGD at an outside institution 2019 around  the time of diverticulitis with GI bleeding at that time, subsequently no GI bleeding.       Patient was most recently seen by cardiology provider Monika Cruz 8/7/2023 noting blood pressure okay but dizzy so stopped the Imdur, continued bisoprolol and lisinopril, follow-up in 1 year; 11/28/2022  was seen by cardiology noting pulmonary embolism, CAD-recommended continuing oral anticoagulation.    Hair loss - [facility] 1/2023 tx'd with topical steroids, minoxidil, biotin started-still slowly  getting better. She had an appointment with Dr. Crawley 3/2023 due to her abnormal RAVI and rheumatologic panel which she actually had  in the past as well, additional blood work unremarkable, hair thinning not felt related to her positive SSA/RAVI, no evidence of connective tissue disease, seen 12/15/2023 recommending back exercises and call if trigger finger worsens-patient's chronic pain is stable.  Patient reported that she  was donating blood for about 15 years most recently May 2021 and had intermittently taken iron but more regularly over the past several  years but  never IV iron/B12/folate.  8/2021 patient was seen by urology Dr. Gibbons noting gross painless hematuria on anticoagulation, adrenal nodule felt to be an adenoma by imaging-labs  done for the adrenal nodule were overall unremarkable, urine cytology negative-he considered a possible cystoscopy-patient told me 6/30/2022 and again 10/27/2022 and 1/8/2024 that she was not going to do a cystoscopy  or follow-up with urology.  Mammogram was unremarkable 9/2023.  Patient  has seen pulmonary, most recently 10/2020, recommended continuing CPAP for CHUCK-she uses her CPAP intermittently.   Patient follows with Dr. Lou, for CKD, also for hypercalcemia with calcium decreased from 2 tablets/day down to 1 tablet/day at his appointment 9/2022, notes not in all scripts EMR, most recently saw nephrology 8/2023 with no new changes.  She saw an endocrinologist  at an outside hospital Dr. Dunbar who reviewed a follow-up CT 3/4/2022 showing stable adrenal nodule, no notes available, said that she did not need to follow-up. 1/19/2022 she saw Dr. Romano for the iron deficiency anemia, noted normal hemoglobin, on anticoagulation,  colonoscopy recommended 5/2024 but not sooner.  Patient gets Prolia from her PCP most recently  7/5/23 and she plans on getting this today 1/8/2024; 2023 DEXA scan showed osteopenia.  Calcium mildly elevated April-June 2022, with calcium dose decreased by her nephrologist 9/2022-normal calcium 1/2023 and most  recently 1/2024.  Patient saw Dr. Lloyd with ENT  5/5/2023 for tinnitus recommending a hearing test, possible Lenire device (neuromodulation device)-1/5/2024 she saw Dr. Lloyd for her annual follow-up, still observing but not requiring hearing aids, light noise recommended for tinnitus.  PCP 4/10/2023 recommended Ozempic for obesity and she took this briefly without any issues but then was concerned as she learned about potential side effects, also PCP at that time referred her to neurology for memory  "loss.  5/26/2023 she  saw Dr. Reyna with neurology for memory loss, did well on the office cognition exam, MRI of the brain 6/9/2023 unremarkable with chronic white matter ischemic disease, follow-up as needed.  Patient saw her new PCP Vanessa Dsouza 8/22/2023 for  chronic medical problems, depression and referred to psychology but as of 1/8/2024 she still has not gone and her mood is doing okay. Patient is expecting her third grandchild March 2024.  12/13/2023 PCP ordered colonoscopy for May 2024, recommended topical steroids for hand dermatitis which is doing better; labs done by PCP 1/2/2024 noted and PCP contacted the patient 1/5/2024 noting elevated glucose with follow-up check in 2 months, leukocytosis recommending recheck in 3 months.  1/8/2024 patient was aware of her lab results, no diabetes yet diagnosed, has had a recent URI with sore throat, cough, runny nose but this is getting better, no recent use of systemic steroids.     Patient has currently no bleeding issues on the Eliquis.  She is by herself today.  In 2022 she noted every time she ate something it \"goes through her\" in terms of her bowels, but did not really feel like it was diarrhea,  symptoms 9/7/2023 actually better, 1/8/2024 bowels actually back to normal.  Starting in 2022 she has had some itching on her buttocks-she did discuss with dermatology in 2023 and they started her on a cream which helps, itching still doing better.   She lives with her  and is able to do chores.  She has had  shortness of breath, mostly dyspnea on exertion, stable for several years.  She rarely eats red meat.  10/2022 I told her she could discontinue the iron but she preferred to stay on this .     Patient received flu vaccination and most recent COVID-vaccine both in October 2023.   Patient received 2 Covid vaccinations and a booster November 2021, another  COVID-vaccine 9/2022.  9/7/2023 recommended ongoing vaccine management through PCP.  I have " reviewed the available laboratory data, radiographic data, medications, and notes, and have summarized them in this note 1/8/2024.  No other hospitalization, major illness, or procedures  since her last visit on 9/7/23.      No fevers, drenching sweats, unintentional weight loss, headaches, sore throat except with recent URI, acute vision changes, chest pain, edema, cough except with recent URI, nausea or vomiting, abdominal pain, blood  in stool now, dysuria or hematuria now , bone/back/muscle/joint pain except as above, numbness, focal weakness, tingling  now (3/2/2023 noted tingling in her toes for the past several weeks), rashes or lumps but itching and alopecia as above , abnormal bruising or bleeding now, diabetes or thyroid disorder although elevated glucose 1/2024.    [facility] transition occurred 10/2/2023 from prior EMR system.  I did not go back in the epic system prior to this point unless patient brought up an issue.  I did review the EMR data in epic from 10/2/2023 going forward, but relied on our old EMR system for data prior to the transition.    Hematology-oncology history (reviewed and updated 1/8/24)  -2016: Negative SPEP, beta-2 glycoprotein antibodies IgA/IgG/IgM, anticardiolipin antibodies IgG/IgA/IgM.  Iron 15% with ferritin 16.  RAVI 1: 640 otherwise negative KENDRA panel seen by Dr. Lopez with rheumatology with KENDRA panel ordered and negative.  -2017: Hemoglobin 12  -2018: Creatinine 1.01-1.2  -2019: Hemoglobin 11-12s.  Iron 41%, ferritin 19.  -10/2020: Normal WBC and platelets, hemoglobin 12.8  -3/2021: Hemoglobin 12.3.  Creatinine 1.06.  -7/8/2021 CT chest showed bilateral pulmonary emboli right greater than left, atelectasis/fibrosis, mild dilated aorta 3.6 cm, fatty liver, 3 cm right adrenal adenoma  -7/2021 patient had bilateral pulmonary emboli without cor pulmonale with symptoms of shortness of breath (unprovoked thrombosis, negative for DVT), acute kidney injury felt secondary to contrast,  UTI, also possible pneumonia, dysphagia with surgery consult  noting no intervention needed, recommended outpatient GI follow-up, recommended iron; patient was treated with a heparin drip and discharged with Xarelto.  -July-August 2021: Normal WBC, hemoglobin 8.8-11.4, MCV 83-84, platelet 200s.  Creatinine  up to 3.2.  Calcium 7.7-10.4.  Normal LFTs.  Urine analysis large blood but on repeat no blood.  TSH 0.47 (0.44 lower limit).  Folic acid 6.6.  B12 447.  Sodium down to 128.  Iron 6% with ferritin 83.  D-dimer 3979.  Normal INR.  Negative stool  occult.  -8/2021 CT abdomen pelvis with 3.2 cm right adrenal nodule  -10/27/2021: WBC 6.1, hemoglobin 12.2, MCV 88, platelet 237.  Normal sodium and potassium, creatinine 1.2, calcium 10.0.  Normal cortisol, PTH intact, aldosterone, epinephrine, norepinephrine, dopamine, renin, basically normal normetanephrines, and  metanephrines.    -I initially saw the patient on 12/20/2021 regarding anemia, data outlined above but noted unprovoked pulmonary embolism. Patient  had a colonoscopy and EGD at an outside institution 5/2019 around the time of diverticulitis  with GI bleeding at that time, endoscopy okay-she even had a capsule endoscopy 6/2019 which was okay per patient.  Patient intermittently took oral iron for several years.  Prior to her pulmonary embolism 7/2021 patient reported no recent surgery, travel,  immobility  She never had IV iron, B12, or folate.  7/2021 was her first thrombosis; no family history of thrombosis; G2, P2.  She had never seen hematology-in college.  She reported anemia off and on since her teenage years.  She only ate red meat rarely.   Her only bleeding episodes were the hematuria 7/2021 as well as GI bleeding with diverticulitis around May 2019.  She denied any pica.  Never had a transfusion.  She was not on a PPI.   -12/20/2021-12/23/2021: Normal CBC with WBC 6.0, hemoglobin 12.7, MCV 88, platelet 224, normal differential number.  Negative  hepatitis and celiac.  Normal sodium and potassium, creatinine 1.36, calcium 9.9, normal LFTs.  Iron 25% with ferritin 50.  Folate  15.  B12 726.  Normal TSH.   -1/19/2022 patient saw Dr. Romano noting her hemoglobin was normal, on anticoagulation, no endoscopy needed until 5/2024.   -1/31/2022 cardiology felt she did not need aspirin while she was on Eliquis.  -2/14/2022: Normal CBC with WBC 6.7, hemoglobin 13.0, MCV 90, platelets 227.  Normal sodium and potassium, creatinine 1.1, calcium 10.2, normal phosphorus  -3/4/2022 CT abdomen without contrast showed liver unremarkable, unremarkable spleen, 25 mm right adrenal nodule felt to be an adenoma, bilateral renal hypodensities felt to be cysts   -4/19/2022 labs from nephrology: WBC 7.4, hemoglobin 13, MCV 91, platelet 224.  Normal sodium and potassium, creatinine 1.3, calcium 10.8.  Normal phosphorus.  PTH intact 19 was normal.   -6/30/2022: WBC 6.0, hemoglobin 13.1, MCV 89, platelet 214, normal differential number.  Normal sodium, potassium 4.0, creatinine 1.3, calcium 10.6, normal LFTs and TSH.  Iron 20% with ferritin 109, normal.  B12 = 436.  Vitamin D 88.  Normal lipid panel.   Negative SPEP.   -9/7/2022 labs by nephrology showed PTH intact normal 29, vitamin D 74.  Vitamin D 1/25 was 41.  Light chain kappa 2.42 elevated, normal lambda and ratio, first light chains.   -October-December 2022: WBC 5.7-8.9, hemoglobin 12.9-13.8, normal MCV, platelets 200s, normal differential number.  Normal sodium and potassium, creatinine 1.2-1.3, normal calcium, normal LFTs.  12/19/2022 normal zinc at 69 and folate of 13.  10/27/2022  iron 21% with ferritin 111.  Normal PTH and TSH.  Kappa 2.49, normal lambda and ratio.  RAVI 1: 320.   -January 2023: Normal CBC with WBC 7.7, hemoglobin 12.9, MCV 91, platelet 214.  Normal sodium, potassium 4.2, creatinine 1.1, calcium 9.2.  Normal PTH.  RAVI 1: 320.  Positive SSA.  -March 2023: WBC 9.2, hemoglobin 13.5, MCV 90, platelet 240, normal  differential number.  Urinalysis no blood or protein.  Elevated folate.   Triglycerides 181.  Normal TSH.  B12 = 600s.  Vitamin  D 69.  Iron 22% with ferritin 130.  Negative rheumatoid factor, beta-2 glycoprotein and anticardiolipin antibodies, C3/C4, antithyroglobulin antibodies.   -6/30/2023: Normal sodium, potassium 4.3, creatinine 1.2, calcium 9.6, normal LFTs.  -9/7/2023: WBC 9.2, hemoglobin 13.6, MCV 93, platelet 235, elevated neutrophils, monocytes 0.8 not significant.  Iron 25%, ferritin elevated 189.  Kappa 2.3 actually lower, normal lambda and ratio.  -1/2/2024: WBC 12.4, hemoglobin 13.9, MCV 93, platelet 247.  Normal magnesium, phosphorus, TSH, vitamin D.  Normal sodium, normal potassium, creatinine 1.3, calcium 9.9, glucose 170.     Past medical history  -Anemia as above   -Hypercalcemia April and June 2022  -Hypertension.  Due to dizziness provider Nancy discontinued Imdur 8/2023 but kept on bisoprolol  and lisinopril.  -CKD with episodes of acute kidney injury   -7/2021 patient had bilateral pulmonary emboli without cor pulmonale with symptoms of shortness of breath (unprovoked thrombosis), acute kidney injury felt secondary to contrast, UTI, also possible pneumonia, dysphagia with surgery consult noting no intervention  needed, recommended outpatient GI follow-up, recommended iron; patient was treated with a heparin drip and discharged with Xarelto (at home she was transitioned to Eliquis due to cost) ; labs notable for hyponatremia, acute kidney injury, urine analysis large blood.  Patient had a colonoscopy and EGD at an outside institution 2019 around the time of diverticulitis.  -2016 RAVI +1: 640 otherwise normal KENDRA panel, seen by rheumatology Dr. Lopez who noted alopecia and additional evaluation ordered.  January 2023 RAVI 1: 320 with positive SSA.  Patient was seen  by Dr. Crawley March and June 2023 with hair thinning and abnormal rheumatologic panel, but negative rheumatoid factor, beta-2  glycoprotein and anticardiolipin antibodies, C3 and C4, antithyroglobulin antibodies;  no evidence of a connective tissue disorder, unlikely clinically significant.  -Dysphagia: 7/2021 esophagram with mild dysmotility, increased left hemidiaphragm, atelectasis/scarring/pneumonitis-patient was treated for pneumonia  -7/2021 right renal cyst  -7/8/2021 CT chest showed bilateral pulmonary emboli right greater than left, atelectasis/fibrosis, mild dilated aorta 3.6 cm, fatty liver, 3 cm right adrenal adenoma  -9/2017 cecal polyp tubular adenoma, internal hemorrhoids  -Asthma, sleep apnea  -Dyslipidemia  -Diastolic CHF, pulmonary hypertension  -Restless leg syndrome  -Stress incontinence  -Tinnitus  -Vitamin D deficiency  -7/2021  painless hematuria in the setting of anticoagulation, negative urine cytology 8/2021  -Palpitations, angina, atherosclerotic cardiac disease-cardiac catheterization overall unremarkable 12/2018   -Alopecia unknown etiology   -Low back pain, neck pain, and leg pain.  12/2021 lumbar spine x-ray showed DJD and scoliosis.  7/2022 x-ray showed moderate spondylosis cervical spine with cervical neuroforaminal narrowing and lumbar spine DJD.   -Anxiety, depression  -Diverticulitis, GI bleed 2019  -Sensorineural hearing loss, tinnitus  -Osteoporosis treated with Prolia 201 7-2021  -Cataracts, floaters, dry eyes, vitreous detachment  -7/2021 facial fracture after fall   -Adrenal adenoma  -Memory loss felt not to be significant by neurology Dr. Reyna May 2023 with MRI of the brain only with chronic white matter ischemic changes June 2023.  -1/2024 elevated glucose and WBC     Past surgical history  Endoscopy as above, left hand surgery, tonsils and adenoids, hysterectomy, appendectomy, cardiac  catheterization     Family history  Sister with colorectal cancer dx  at age 62 otherwise no first-degree relatives with malignancy.  No family history of thrombosis or anemia.     Social history  No tobacco  except 1 pack/day for 10 years in the  1970s, alcohol, or drugs.        Allergies   Allergen Reactions    Penicillin Hives and Rash     Prescribed ceftriaxone in July 2021     Current Outpatient Medications on File Prior to Visit   Medication Sig Dispense Refill    acetaminophen (Tylenol) 325 mg tablet Take 2 tablets (650 mg) by mouth every 6 hours if needed.      apixaban (Eliquis) 5 mg tablet Take 1 tablet (5 mg) by mouth every 12 hours.      artificial tears, dextran-hypomel-glycerin, 0.1-0.3-0.2 % ophthalmic solution Administer into affected eye(s).      atorvastatin (Lipitor) 40 mg tablet Take 1 tablet (40 mg) by mouth once daily at bedtime. 90 tablet 3    bisoprolol (Zebeta) 5 mg tablet Take 0.5 tablets (2.5 mg) by mouth once daily. 45 tablet 3    calcium carbonate-vitamin D3 (Oyster Shell) 250 mg-3.125 mcg (125 unit) tablet Take 1 tablet by mouth 2 times a day with meals. 180 tablet 3    cholecalciferol (Vitamin D-3) 25 MCG (1000 UT) tablet Take 2 tablets (2,000 Units) by mouth once daily.      denosumab (Prolia) 60 mg/mL syringe Prolia      DULoxetine (Cymbalta) 30 mg DR capsule Take 1 capsule (30 mg) by mouth 2 times a day. 180 capsule 1    ferrous sulfate, 325 mg ferrous sulfate, tablet Take 1 tablet by mouth.      HAIR, SKIN AND NAILS, BIOTIN, ORAL Take by mouth once daily.      ketoconazole (NIZOral) 2 % cream       lisinopril 10 mg tablet Take 1 tablet (10 mg) by mouth once daily. 90 tablet 3    minoxidil (Rogaine) 2 % external solution Apply topically 2 times a day.      nitroglycerin (Nitrostat) 0.4 mg SL tablet DISSOLVE 1 TABLET UNDER THE TONGUE EVERY 5 MINUTES FOR UP TO 3 DOSES AS NEEDED FOR CHEST PAIN.CALL 911 IF PAIN PERSISTS.      triamcinolone (Kenalog) 0.1 % cream Apply topically 2 times a day. Apply to affected area 1-2 times daily as needed. Avoid face and groin. 30 g 0    triamcinolone (Kenalog) 0.1 % lotion       [DISCONTINUED] diazePAM (Valium) 5 mg tablet Take by mouth.       [DISCONTINUED] minoxidil (Loniten) 10 mg tablet Take 1 tablet (10 mg) by mouth once daily.       No current facility-administered medications on file prior to visit.     Vitals:    01/08/24 1017   BP: 109/75   Pulse: 93   Resp: 18   Temp: 35.2 °C (95.4 °F)   SpO2: 95%          Physical Exam:      Constitutional: Performance status 0-1. 97.1 kg.  -General: Well-developed and well-nourished. No acute distress.  Elevated BMI noted.  -Lymphatics: No cervical or supraclavicular lymphadenopathy.  -Eyes: Wears glasses.  Anicteric.  -HEENT: MMM with no visible oral lesions.  -Cardiovascular: RRR.    -Respiratory: Clear to auscultation bilaterally. Breathing is nonlabored.  -Abdomen: Soft, nontender, limited by body habitus.  -Back: No costovertebral angle tenderness. No spine tenderness.  -Extremities: Trace bilateral leg edema-stable.  -Neurologic: Awake, alert, and oriented. Moves all extremities well. Speech fluent with normal content.   -Skin:  Partial alopecia, seems a little better.  -Psychiatric: Appropriate, cooperative, and pleasant.       Assessment and Plan:   Assessment:    #Normocytic anemia with hemoglobin down to 8.8 in July/August 2021 likely due to  acute on chronic kidney injury, painless hematuria at least in part secondary to UTI, normal WBC and platelets for the most part until leukocytosis 1/2024 as below.  No significant anemia documented prior to this and hemoglobin 10/2021  normal as well as subsequently.  Patient does have a history of iron deficiency dating back at least to 2016 (iron 15% with ferritin  16) with borderline iron studies July/August 2021, also borderline folate July/August 2021 with normal B12 and negative stool occult.   Iron deficiency could definitely have been due to blood donation,  also lack of intake in her diet.  2016 and 2022 negative SPEP , but 9/2022 mild elevated kappa light chain of questionable significance.    EGD, colonoscopy, capsule endoscopy 2019 no evidence of  bleeding according to patient although she had diverticulitis with GI bleeding in 2019.    She tolerates oral iron, has intermittently been on it for years.  My evaluation 12/2021 showed a normal CBC, iron 25% with ferritin 50, normal B12 and folate, negative celiac,  negative hepatitis.  1/2021 Dr. Romano saw the patient and recommended no further endoscopy.  Again, hemoglobin normal on most recent labs 1/2024 with iron studies/folate/B12 okay 2023.  Mildly elevated light chains at the end of 2022 overall stable  of questionable clinical significance, may be due to an underlying rheumatologic disorder as below, actually better 9/2023.  Patient could have anemia secondary to CKD, although hemoglobin actually normal 1/2024.  9/2023 iron 25% with ferritin actually elevated 189.  NEGATIVE HEPATITIS PANEL 12/2021.     # Leukocytosis 1/2/2024 WBC 12.4 likely due to recent URI which is getting better on its own.     #Mild hypercalcemia April and June 2022 at 10.6-10.8, SPEP negative in 2016, on calcium supplementation.  Myeloma seems unlikely given stable CKD and only minimal elevation in calcium.  Note that patient gets Prolia which can help the hypercalcemia.   Mildly abnormal kappa light chain 9/2022 of questionable significance, first-ever-stable on follow-up at the end of 2022.  9/2022 evaluation by nephrology showed normal PTH, vitamin D, vitamin D 1/25.  Patient decreased her calcium supplementation from  2 tablets/day down to 1 tablet/day 9/2022.  Normal calcium 1/2023 and on most recent labs 1/2024 with kappa light chain better 9/2023 again of questionable clinical significance.     #Unprovoked bilateral pulmonary emboli without cor pulmonale and without DVT 7/2021, D-dimer elevated 3979.  In 2016 patient had negative beta-2 glycoprotein antibodies and anticardiolipin antibodies done during rheumatology evaluation.  No signs or  signs symptoms of malignancy.  No smoking.  Obesity does put her at increased risk of  thrombosis.  No family history of thrombosis and no personally history of miscarriages.  First thrombotic event .  We discussed a hypercoagulable evaluation and ultimately deferred that 3/10/2022.  Aspirin was discontinued from cardiology 1/31/2022 while she is on other anticoagulation.     #RAVI 1: 640 otherwise negative KENDRA panel seen by Dr. Lopez with rheumatology with KENDRA panel ordered and negative.   Patient did not recall the details of this visit.  1/2023 RAVI 1: 320 with positive SSA.  Dermatology referred her to rheumatology again due to the alopecia and possible rheumatologic disorder, was seen by rheumatology again  March-June 2023 felt not to have a connective tissue disorder.    #Arthritis    #Alopecia unknown etiology, going on for years, seen by dermatology and started on treatment 1/2023 with rheumatology evaluation unremarkable 2023    #2021 imaging with dilated aorta, fatty liver, adrenal adenoma , renal cyst.  She reported endocrinology 3/2022 based on a follow-up CT 3/2022 recommended no further follow-up of the adrenal adenoma.     #CKD with episodes of acute kidney injury 2021 felt secondary to contrast.  6/2023 creatinine 1.2 and 1.3 in 1/2024.     #7/2021 gross painless hematuria on anticoagulation possibly due to underlying UTI     #July/August 2021 TSH lower limit of normal.  TSH within normal limits 12/2021 and 12/2022 and 1/2024.     #Family history of colon cancer and personal history of polyps.     #July 2021-8/2021 CT abdomen pelvis with 3.2 cm right adrenal nodule.  10/2021 basically normal cortisol, PTH intact, aldosterone, epinephrine, norepinephrine, dopamine, renin, normetanephrines, and metanephrines.   Updated CT 3/2022 stable.     #Hypertension    #Dysphagia: 7/2021 esophagram with mild dysmotility, increased left hemidiaphragm, atelectasis/scarring/pneumonitis-patient was treated for pneumonia.  Dysphagia resolved and patient declined follow-up  with GI.    #9/2017 cecal polyp  tubular adenoma, internal hemorrhoids     #Asthma, sleep apnea, dyslipidemia, diastolic CHF, pulmonary hypertension, restless leg syndrome. stress incontinence, tinnitus/hearing loss, vitamin D deficiency, CAD, axiety, depression, diverticulitis, osteoporosis treated with Prolia     #2022 patient started having frequent bowel movements (previous celiac panel negative) with bowels improved in 2023 and back to normal 1/8/2024.    # Elevated glucose 1/2024 without a history of diabetes     -CBC with differential with further evaluation if WBC count remains elevated (if minimally elevated could repeat in a few months or in 1 month when she no longer has her URI), and iron studies ordered.  -I recommended follow-up with PCP for her URI if symptoms not improved, also for her elevated glucose.  -I again advised follow-up with PCP and/or her subspecialists regarding her skin, nononcology issues on imaging, fatty liver, CKD,  hematuria.  1/8/2024 patient declined follow-up with urology and declined a referral to hepatology.  -Light chains done 9/2023 can be monitored next visit.  - CMP is at the discretion of PCP/renal .  -Iron instructions were reviewed most recently 3/10/2022.  Given the elevated ferritin, 1/8/2024 I told her to discontinue the iron.  I reminded her if she does resume donating blood, I advised her to take iron for 2 weeks before and 2 weeks after.  -Dr. Romano recommended a colonoscopy 5/2024.  Consider endoscopy sooner if she has iron deficiency again.   -Given her unprovoked thrombosis, consider indefinite anticoagulation as long as the benefit outweighs the risk.  Anticoagulation is at the discretion of PCP, currently on Eliquis, avoid if weight  greater than 120 kg so patient is okay at this time.  A hypercoagulable evaluation was discussed, would not change recommendation  for indefinite anticoagulation-3/10/2022 we agreed to defer hypercoagulable evaluation for now.  General healthy lifestyle with   reduction of immobility and prevention of blood clots was encouraged including prophylactic anticoagulation if ever hospitalized or has a surgery if not on anticoagulation, no smoking, optimization of weight, getting up and moving around regularly if  on a plane or in a car, no hormonal agents.  Compression stockings were recommended again 1/8/2024. Concurrent other blood thinning medications  were discouraged unless clinically needed.  I have advised the  patient to get a medical alert bracelet while on anticoagulation-she did get this 8/2022 and is still using this.  Elevated D-dimer could be followed up.   - Labs are incorporated in my note which is to be sent to PCP. I have recommended routine health care maintenance and screening through PCP. The patient was reminded to followup with the PCP for other medical problems and ongoing care. The patient was  asked to return to clinic, or sooner as needed for new or concerning symptoms, in 6 months (she comes every 6 months anyway for her Prolia ordered by PCP).    1/8/2024 I explained my upcoming departure from [facility], explained that I was told that [facility] is working on my replacement, notified patient to make sure appointments/orders are scheduled and kept as directed.  I wished the patient the best of luck.

## 2024-03-13 ENCOUNTER — APPOINTMENT (OUTPATIENT)
Dept: OPHTHALMOLOGY | Facility: CLINIC | Age: 74
End: 2024-03-13
Payer: MEDICARE

## 2024-04-03 NOTE — PROGRESS NOTES
Subjective     HPI   Soni Cleveland is a 73 y.o. year old female patient with presenting to clinic with concern for   Chief Complaint   Patient presents with    Follow-up         Fatigue since increasing cymbalta to 30mg BID from every day. Changed at last visit to take 60 mg at bedtime instead of morning- not helping. Go back to 30mg every day. Will consider alternative depression options next visit. Liekly contribiting to fatigue  Has cardiology appointment upcoming with Dr. Diaz.    Due for colonoscopy- ordered  Jayjay due in Sept    Miscellaneous Notes  - documented in this encounter  Telephone Encounter - Connor Castelan - 09/04/2019 3:27 PM EDT  Formatting of this note might be different from the original.  Called patient on her cell phone. Informed her that the capsule endoscopy did not show any findings to explain her acute GI bleeding. The patient presented initially with hematochezia and a drop in hemoglobin of 3 g from her baseline. Initial presentation was accompanied by hypotension. Admission she will underwent upper endoscopy which did not demonstrate any pathology to explain the hematochezia. The patient subsequently underwent a colonoscopy 2 days later and the colostomy demonstrated some scattered diverticulosis but was otherwise unremarkable. Capsule endoscopy completed on August 6, 2019 was read the evening of the capsule being completed and no significant pathology was identified. A capsule endoscopy report has been reread and an amended report will be scanned into Epic reflecting only a change in the time of the first duodenal image. No pathology within the small bowel is identified to explain the hematochezia. I suspect the patient's acute presentation was secondary to diverticular bleeding. No reports of any recurrence of GI bleeding since discharge from the hospital.    Connor Castelan MD       Reviewed vaccines  Immunization History   Administered Date(s) Administered    Flu vaccine,  "quadrivalent, high-dose, preservative free, age 65y+ (FLUZONE) 12/02/2021    Influenza, High Dose Seasonal, Preservative Free 10/09/2019    Influenza, Seasonal, Quadrivalent, Adjuvanted 11/05/2020, 09/12/2022, 10/19/2023    Influenza, seasonal, injectable 09/12/2022    Influenza, seasonal, injectable, preservative free 10/01/2014    Moderna COVID-19 vaccine, Fall 2023, 12 yeasrs and older (50mcg/0.5mL) 09/21/2023    Moderna COVID-19 vaccine, bivalent, blue cap/gray label *Check age/dose* 10/08/2022    Moderna SARS-CoV-2 Vaccination 02/05/2021, 03/05/2021, 11/18/2021, 04/05/2022, 10/08/2022    Pneumococcal conjugate vaccine, 13-valent (PREVNAR 13) 07/18/2017    Pneumococcal polysaccharide vaccine, 23-valent, age 2 years and older (PNEUMOVAX 23) 10/17/2014, 01/14/2021    Pneumococcal, Unspecified 01/14/2021    RSV, 60 Years And Older (AREXVY) 09/21/2023    Tdap vaccine, age 7 year and older (BOOSTRIX, ADACEL) 10/19/2023    Zoster vaccine, recombinant, adult (SHINGRIX) 01/24/2021, 12/15/2021    Zoster, live 10/01/2014          Topic Date Due    Hepatitis A Vaccines (1 of 2 - Risk 2-dose series) Never done    Hepatitis B Vaccines (1 of 3 - Risk 3-dose series) Never done         Needs to be on Rx Ca and Vit D. DEXA due in 5 more years.    Shaky hands at times in the mornings and times of stress. Infrequent.   Itching hands and dry skin. Hx dryness.     Prolia infusion coming up soon. Let me know if you need ordered.    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     NonCompliant with CPAP- she does not use her CPAP. Uncomfortable. Tried variations without benefit.     Depressed- retired from the schools at the same time as her  immediately before COVID pandemic. She is frustrated and sad that she put on weight and became sedentary while he has become very active and fit. She has seen psych for counseling before. She has been on cymbalta 30mg every day for\" a long time\". " But states that she never really felt better on it.   Increasing cymbalta didn't help. Reducing dose again to 30 mg and will consider other options at next visit.    DVT PE July 2021- on eliquis  CKD follows with [facility] Dr Lou  Operative Report - Connor Castelan - 05/06/2019 8:59 AM EDT  Formatting of this note is different from the original.  COLONOSCOPY REPORT    Patient Name: Soni Cleveland  MRN: [id number]  Date of Surgery: 5/6/2019    Surgeon: Connor Castelan MD    Preoperative Diagnosis: Hematochezia    Postop Diagnosis: Diverticulosis, moderate internal hemorrhoids    Procedure(s): Colonoscopy    Indication for the procedure: Hematochezia with hypotension    Consent: Informed consent was obtained after the risks, including but not limited to bleeding, infection, perforation, allergic reactions, missed lesions, death and the possible need for surgery were explained to the patient. In addition, the benefits and alternatives to the procedure were also explained to the patient. The patient understands fully and agrees to proceed as planned. All questions were answered to full satisfaction. Informed consent was received from the patient.    Medications: Versed 3 mg IV, Fentanyl 50 µg IV    Procedure Technique:  The patient was placed in the left lateral position and underwent continuous pulse oximetry and blood pressure monitoring thoughout the procedure. Following IV sedation, a fiberoptic videocolonoscope was introduced through the rectum and advanced under direct visualization all the way to the cecum, confirmed by the appendiceal orifice, the ileocecal valve and the convergence of tenia coli. The quality of the bowel preparation was good. Retroflexion was performed in the rectum. The patient tolerated the procedure well with no immediate complications. Withdrawal time was 6 minutes.    Findings:  RECTAL EXAM: Normal sphincter tone, no palpable masses.  COLON: A slim pediatric colonoscopy was inserted in the rectum  and advanced under endoscopic position of the cecum. The bowel prep was good. Scattered yellow bubbles were noted throughout the colon which were lavaged and suctioned with good visualization of mucosa. In the ascending colon there was a small diverticulum with a fecalith. No active bleeding seen. The scope was then retracted through into the transverse, descending colon which was normal in the sigmoid colon there were a few small diverticulum identified but otherwise unremarkable. Retroflexed views in the rectum revealed large nonbleeding internal hemorrhoids.    Specimens: none  Estimated Blood Loss: none  Complications: none    Unclear if this few diverticula were adequate to explain the patient's hematochezia.    Suggest:  1- schedule outpatient capsule endoscopy  2-patient stable for discharge    Signature: Connor Castelan MD  Date: May 6, 2019  Time: 9:21 AM    Electronically signed by Connor Castelan at 05/06/2019 9:25 AM EDT   Back to   Patient Active Problem List   Diagnosis    Abnormal EKG    Abnormally compliant left middle ear system with type AD tympanogram curve    Adrenal nodule (CMS/HCC)    Agatston coronary artery calcium score less than 100    Age-related nuclear cataract, bilateral    Allergic reaction    Anemia    Angina pectoris (CMS/HCC)    Asymmetric SNHL (sensorineural hearing loss)    Primary hypertension    Chronic fatigue    Class 2 severe obesity due to excess calories with serious comorbidity and body mass index (BMI) of 39.0 to 39.9 in adult (CMS/HCC)    Depression with anxiety    Dry eyes    Chronic deep vein thrombosis (DVT) of lower extremity (CMS/HCC)    Dyslipidemia    Dysphagia    Gait abnormality    Heart palpitations    Mild intermittent asthma without complication    Neck pain    Obstructive sleep apnea    Osteoporosis    Posterior vitreous detachment of both eyes    Presbyacusis    Pulmonary embolism (CMS/HCC)    Renal cyst    Restless legs syndrome    Stage 3b chronic kidney  disease (CMS/HCC)    Stress incontinence in female    Tinnitus of both ears    Tubular adenoma of colon    Vitamin D deficiency    Vitreous floaters    Decreased hearing    Acute low back pain    Chronic left-sided low back pain with left-sided sciatica    Mild ascending aorta dilatation (CMS/HCC)    Age-related osteoporosis without current pathological fracture    Acute pulmonary embolism without acute cor pulmonale (CMS/HCC)    Headache    Mild intermittent asthma    Gastrointestinal hemorrhage    Bright red rectal bleeding    Shortness of breath    Adrenal mass 1 cm to 4 cm in diameter (CMS/HCC)    Vertigo    Urinary tract infection    Posterior vitreous detachment    Osteopenia    Nuclear senile cataract    Migraine    Metabolic syndrome    Memory impairment    Memory difficulty    Localized edema    Hypocalcemia    History of pulmonary embolism    Hematuria    Gastrointestinal hemorrhage with melena    Elevated cholesterol    Diastolic heart failure (CMS/HCC)    Deep vein thrombosis (DVT) (CMS/HCC)    Cough    Nonscarring hair loss, unspecified    Alopecia    Adenoma of right adrenal gland    Back pain    Pulmonary hypertension (CMS/HCC)    Acute pulmonary embolism (CMS/HCC)    Benign neoplasm of adrenal gland, right    Polyneuropathy, unspecified    Asymptomatic postmenopausal state    Cervical dysphagia    Cystic kidney disease, unspecified    Deficiency of other specified B group vitamins    Injury of kidney    Iron deficiency anemia, unspecified    Other chronic pain    Polyp of colon    Other specified disorders of bone density and structure, unspecified site    Disorder of phosphorus metabolism, unspecified    Hypertensive heart and chronic kidney disease with heart failure and stage 1 through stage 4 chronic kidney disease, or unspecified chronic kidney disease (CMS/HCC)    Fatty (change of) liver, not elsewhere classified    H/O high risk medication treatment    Headache, common migraine    Neck pain on  right side    Hypercalcemia       Past Medical History:   Diagnosis Date    Asymmetrical sensorineural hearing loss 03/09/2018    Asymmetrical left sensorineural hearing loss    Chronic fatigue 03/24/2021    History of chronic fatigue    Chronic kidney disease, stage 3a (CMS/HCC) 02/19/2021    now Stage 3B CKD  Aug 2023    Fall 01/25/2023    Subsequent encounter    GI bleeding 01/25/2023    Gross hematuria 01/25/2023    Impacted cerumen, right ear 02/27/2018    Impacted cerumen of right ear    Tinnitus, bilateral 08/18/2020    Tinnitus, bilateral      Past Surgical History:   Procedure Laterality Date    APPENDECTOMY  03/19/2015    Appendectomy    HYSTERECTOMY  03/19/2015    Hysterectomy    MR HEAD ANGIO WO IV CONTRAST  2/21/2012    MR HEAD ANGIO WO IV CONTRAST [facility]    MR NECK ANGIO WO IV CONTRAST  2/21/2012    MR NECK ANGIO WO IV CONTRAST [facility]    TONSILLECTOMY  03/19/2015    Tonsillectomy      Family History   Problem Relation Name Age of Onset    Cirrhosis Mother      Stroke Father          CVA    Heart failure Father          Congestive    Gout Father      Heart attack Father          Myocardial infarction    Colon cancer Sister        Social History     Tobacco Use    Smoking status: Former     Packs/day: 1.00     Years: 10.00     Additional pack years: 0.00     Total pack years: 10.00     Types: Cigarettes    Smokeless tobacco: Never    Tobacco comments:     Was a smoker for about 10 yrs   Substance Use Topics    Alcohol use: Not Currently        Current Outpatient Medications:     acetaminophen (Tylenol) 325 mg tablet, Take 2 tablets (650 mg) by mouth every 6 hours if needed., Disp: , Rfl:     apixaban (Eliquis) 5 mg tablet, Take 1 tablet (5 mg) by mouth every 12 hours., Disp: , Rfl:     artificial tears, dextran-hypomel-glycerin, 0.1-0.3-0.2 % ophthalmic solution, Administer into affected eye(s)., Disp: , Rfl:     atorvastatin (Lipitor) 40 mg tablet, Take 1 tablet (40 mg) by  "mouth once daily at bedtime., Disp: 90 tablet, Rfl: 3    bisoprolol (Zebeta) 5 mg tablet, Take 0.5 tablets (2.5 mg) by mouth once daily., Disp: 45 tablet, Rfl: 3    calcium carbonate-vitamin D3 (Oyster Shell) 250 mg-3.125 mcg (125 unit) tablet, Take 1 tablet by mouth 2 times a day with meals., Disp: 180 tablet, Rfl: 3    cholecalciferol (Vitamin D-3) 25 MCG (1000 UT) tablet, Take 2 tablets (2,000 Units) by mouth once daily., Disp: , Rfl:     denosumab (Prolia) 60 mg/mL syringe, Prolia, Disp: , Rfl:     ferrous sulfate, 325 mg ferrous sulfate, tablet, Take 1 tablet by mouth., Disp: , Rfl:     HAIR, SKIN AND NAILS, BIOTIN, ORAL, Take by mouth once daily., Disp: , Rfl:     ketoconazole (NIZOral) 2 % cream, , Disp: , Rfl:     lisinopril 10 mg tablet, Take 1 tablet (10 mg) by mouth once daily., Disp: 90 tablet, Rfl: 3    minoxidil (Rogaine) 2 % external solution, Apply topically 2 times a day., Disp: , Rfl:     nitroglycerin (Nitrostat) 0.4 mg SL tablet, DISSOLVE 1 TABLET UNDER THE TONGUE EVERY 5 MINUTES FOR UP TO 3 DOSES AS NEEDED FOR CHEST PAIN.CALL 911 IF PAIN PERSISTS., Disp: , Rfl:     triamcinolone (Kenalog) 0.1 % cream, Apply topically 2 times a day. Apply to affected area 1-2 times daily as needed. Avoid face and groin., Disp: 30 g, Rfl: 0    DULoxetine (Cymbalta) 30 mg DR capsule, Take 1 capsule (30 mg) by mouth once daily., Disp: 90 capsule, Rfl: 0     Review of Systems  Constitutional: Denies fever  HEENT: Denies ST, earache  CVS: Denies Chest pain  Pulmonary: Denies wheezing, SOB  GI: Denies N/V  : Denies dysuria  Musculoskeletal:  Denies myalgia  Neuro: Denies focal weakness or numbness.  Skin: Denies Rashes.  *Review of Systems is negative unless otherwise mentioned in HPI or ROS above.    Objective   /62   Pulse 68   Temp 37 °C (98.6 °F)   Ht 1.575 m (5' 2\")   Wt 95.6 kg (210 lb 12.8 oz)   SpO2 93%   BMI 38.56 kg/m²  reviewed Body mass index is 38.56 kg/m².     Physical Exam  Constitutional: " NAD.  Resting comfortably.  Head: Atraumatic, normocephalic.  ENT: Moist oral mucosa. Nasal mucosa wnl.   Cardiac: Regular rate & rhythm.   Pulmonary: Lungs clear bilat  GI: Soft, Nontender, nondistended.   Musculoskeletal: No peripheral edema.   Skin: No evidence of trauma. No rashes  Psych: Intact judgement and insight.    .Assessment/Plan   Problem List Items Addressed This Visit             ICD-10-CM    Iron deficiency anemia, unspecified - Primary D50.9    Relevant Orders    Iron and TIBC     Other Visit Diagnoses         Codes    Borderline hyperlipidemia     E78.5    Relevant Orders    CBC    Comprehensive Metabolic Panel    TSH with reflex to Free T4 if abnormal    Lipid Panel    Screening for colon cancer     Z12.11    Other fatigue     R53.83    Relevant Orders    Vitamin B12    Magnesium    Iron and TIBC    Healthcare maintenance     Z00.00    Relevant Medications    DULoxetine (Cymbalta) 30 mg DR capsule

## 2024-04-04 ENCOUNTER — TELEPHONE (OUTPATIENT)
Dept: PRIMARY CARE | Facility: CLINIC | Age: 74
End: 2024-04-04

## 2024-04-04 ENCOUNTER — OFFICE VISIT (OUTPATIENT)
Dept: PRIMARY CARE | Facility: CLINIC | Age: 74
End: 2024-04-04
Payer: MEDICARE

## 2024-04-04 ENCOUNTER — TELEPHONE (OUTPATIENT)
Dept: HEMATOLOGY/ONCOLOGY | Facility: HOSPITAL | Age: 74
End: 2024-04-04

## 2024-04-04 VITALS
OXYGEN SATURATION: 93 % | SYSTOLIC BLOOD PRESSURE: 124 MMHG | HEART RATE: 68 BPM | DIASTOLIC BLOOD PRESSURE: 62 MMHG | WEIGHT: 210.8 LBS | HEIGHT: 62 IN | TEMPERATURE: 98.6 F | BODY MASS INDEX: 38.79 KG/M2

## 2024-04-04 DIAGNOSIS — Z00.00 HEALTHCARE MAINTENANCE: ICD-10-CM

## 2024-04-04 DIAGNOSIS — D50.0 IRON DEFICIENCY ANEMIA DUE TO CHRONIC BLOOD LOSS: Primary | ICD-10-CM

## 2024-04-04 DIAGNOSIS — R53.83 OTHER FATIGUE: ICD-10-CM

## 2024-04-04 DIAGNOSIS — E78.5 BORDERLINE HYPERLIPIDEMIA: ICD-10-CM

## 2024-04-04 DIAGNOSIS — Z12.11 SCREENING FOR COLON CANCER: ICD-10-CM

## 2024-04-04 PROBLEM — N18.32 STAGE 3B CHRONIC KIDNEY DISEASE (MULTI): Status: ACTIVE | Noted: 2022-11-28

## 2024-04-04 PROBLEM — N17.9 AKI (ACUTE KIDNEY INJURY) (CMS-HCC): Status: RESOLVED | Noted: 2023-12-15 | Resolved: 2024-04-04

## 2024-04-04 PROCEDURE — 1160F RVW MEDS BY RX/DR IN RCRD: CPT | Performed by: PHYSICIAN ASSISTANT

## 2024-04-04 PROCEDURE — 3078F DIAST BP <80 MM HG: CPT | Performed by: PHYSICIAN ASSISTANT

## 2024-04-04 PROCEDURE — 99214 OFFICE O/P EST MOD 30 MIN: CPT | Performed by: PHYSICIAN ASSISTANT

## 2024-04-04 PROCEDURE — 3074F SYST BP LT 130 MM HG: CPT | Performed by: PHYSICIAN ASSISTANT

## 2024-04-04 PROCEDURE — 1036F TOBACCO NON-USER: CPT | Performed by: PHYSICIAN ASSISTANT

## 2024-04-04 PROCEDURE — 3008F BODY MASS INDEX DOCD: CPT | Performed by: PHYSICIAN ASSISTANT

## 2024-04-04 PROCEDURE — 1159F MED LIST DOCD IN RCRD: CPT | Performed by: PHYSICIAN ASSISTANT

## 2024-04-04 RX ORDER — DULOXETIN HYDROCHLORIDE 30 MG/1
30 CAPSULE, DELAYED RELEASE ORAL DAILY
Qty: 90 CAPSULE | Refills: 0
Start: 2024-04-04 | End: 2024-07-03

## 2024-04-04 NOTE — TELEPHONE ENCOUNTER
----- Message from Vanessa Dsouza PA-C sent at 4/4/2024 12:33 PM EDT -----  I called back and told Soni 10 year follow up from 2019. Not due now. I looked into this more thoroughly and she had no polyps. The diagnostic colonoscopy was for bleeding and found diverticulosis.   ----- Message -----  From: Kristal Caal MA  Sent: 4/4/2024  12:03 PM EDT  To: Vanessa Dsouza PA-C    Patient asking if needs to schedule a colonoscopy? This is her most recent one from 2019. I informed the patient I will call back and inform her whether she has to schedule.

## 2024-04-04 NOTE — ASSESSMENT & PLAN NOTE
I recommend food choices with slow breakdown of nutrients like complex carbs with visible components in breads or whole wheat pasta, some granola bars or crackers, proteins such as eggs, nuts, beans as well as whole fruits and vegetables like apples with the skin on, leafy greens etc.

## 2024-04-04 NOTE — TELEPHONE ENCOUNTER
Patient is a previous [facility] patient and is due for Hem Onc follow up around 7/6/24. Attempted to reach patient to schedule established hem onc appointment with Dr. Esteves. Left detailed message, with call back number, for patient to call back at earliest convenience.

## 2024-04-09 NOTE — TELEPHONE ENCOUNTER
Patient is a previous [facility] patient and is due for Hem Onc follow up around 7/6/24. Attempted to reach patient x2 to schedule established Hem Onc appointment with Dr. Esteves. Left detailed message, with call back number, for patient to call back at earliest convenience.

## 2024-04-09 NOTE — TELEPHONE ENCOUNTER
Patient returned phone call. Patient is agreeable to establish care with Dr. Esteves. Scheduled for appointment on Thursday, 7/11/24 at 10:30 AM with Prolia injection to follow. Patient verbalized understanding and agreed to appointment.

## 2024-06-01 ENCOUNTER — LAB (OUTPATIENT)
Dept: LAB | Facility: LAB | Age: 74
End: 2024-06-01
Payer: MEDICARE

## 2024-06-01 DIAGNOSIS — D50.0 IRON DEFICIENCY ANEMIA DUE TO CHRONIC BLOOD LOSS: ICD-10-CM

## 2024-06-01 DIAGNOSIS — R53.83 OTHER FATIGUE: ICD-10-CM

## 2024-06-01 DIAGNOSIS — E78.5 BORDERLINE HYPERLIPIDEMIA: ICD-10-CM

## 2024-06-01 LAB
ALBUMIN SERPL BCP-MCNC: 4.3 G/DL (ref 3.4–5)
ALP SERPL-CCNC: 85 U/L (ref 33–136)
ALT SERPL W P-5'-P-CCNC: 23 U/L (ref 7–45)
ANION GAP SERPL CALC-SCNC: 14 MMOL/L (ref 10–20)
AST SERPL W P-5'-P-CCNC: 22 U/L (ref 9–39)
BILIRUB SERPL-MCNC: 0.5 MG/DL (ref 0–1.2)
BUN SERPL-MCNC: 23 MG/DL (ref 6–23)
CALCIUM SERPL-MCNC: 10.1 MG/DL (ref 8.6–10.3)
CHLORIDE SERPL-SCNC: 104 MMOL/L (ref 98–107)
CHOLEST SERPL-MCNC: 184 MG/DL (ref 0–199)
CHOLESTEROL/HDL RATIO: 3
CO2 SERPL-SCNC: 27 MMOL/L (ref 21–32)
CREAT SERPL-MCNC: 1.22 MG/DL (ref 0.5–1.05)
EGFRCR SERPLBLD CKD-EPI 2021: 48 ML/MIN/1.73M*2
ERYTHROCYTE [DISTWIDTH] IN BLOOD BY AUTOMATED COUNT: 13.7 % (ref 11.5–14.5)
GLUCOSE SERPL-MCNC: 101 MG/DL (ref 74–99)
HCT VFR BLD AUTO: 44.5 % (ref 36–46)
HDLC SERPL-MCNC: 60.9 MG/DL
HGB BLD-MCNC: 13.6 G/DL (ref 12–16)
IRON SATN MFR SERPL: 22 % (ref 25–45)
IRON SERPL-MCNC: 85 UG/DL (ref 35–150)
LDLC SERPL CALC-MCNC: 88 MG/DL
MAGNESIUM SERPL-MCNC: 1.88 MG/DL (ref 1.6–2.4)
MCH RBC QN AUTO: 27.9 PG (ref 26–34)
MCHC RBC AUTO-ENTMCNC: 30.6 G/DL (ref 32–36)
MCV RBC AUTO: 91 FL (ref 80–100)
NON HDL CHOLESTEROL: 123 MG/DL (ref 0–149)
NRBC BLD-RTO: 0 /100 WBCS (ref 0–0)
PLATELET # BLD AUTO: 225 X10*3/UL (ref 150–450)
POTASSIUM SERPL-SCNC: 5 MMOL/L (ref 3.5–5.3)
PROT SERPL-MCNC: 7.1 G/DL (ref 6.4–8.2)
RBC # BLD AUTO: 4.87 X10*6/UL (ref 4–5.2)
SODIUM SERPL-SCNC: 140 MMOL/L (ref 136–145)
TIBC SERPL-MCNC: 393 UG/DL (ref 240–445)
TRIGL SERPL-MCNC: 175 MG/DL (ref 0–149)
TSH SERPL-ACNC: 1.72 MIU/L (ref 0.44–3.98)
UIBC SERPL-MCNC: 308 UG/DL (ref 110–370)
VIT B12 SERPL-MCNC: 567 PG/ML (ref 211–911)
VLDL: 35 MG/DL (ref 0–40)
WBC # BLD AUTO: 6.5 X10*3/UL (ref 4.4–11.3)

## 2024-06-01 PROCEDURE — 82607 VITAMIN B-12: CPT

## 2024-06-01 PROCEDURE — 83550 IRON BINDING TEST: CPT

## 2024-06-01 PROCEDURE — 83540 ASSAY OF IRON: CPT

## 2024-06-01 PROCEDURE — 84443 ASSAY THYROID STIM HORMONE: CPT

## 2024-06-01 PROCEDURE — 36415 COLL VENOUS BLD VENIPUNCTURE: CPT

## 2024-06-01 PROCEDURE — 80053 COMPREHEN METABOLIC PANEL: CPT

## 2024-06-01 PROCEDURE — 80061 LIPID PANEL: CPT

## 2024-06-01 PROCEDURE — 85027 COMPLETE CBC AUTOMATED: CPT

## 2024-06-01 PROCEDURE — 83735 ASSAY OF MAGNESIUM: CPT

## 2024-06-04 DIAGNOSIS — D50.0 IRON DEFICIENCY ANEMIA DUE TO CHRONIC BLOOD LOSS: Primary | ICD-10-CM

## 2024-06-04 RX ORDER — FERROUS SULFATE 325(65) MG
325 TABLET ORAL
Qty: 90 TABLET | Refills: 3 | Status: SHIPPED | OUTPATIENT
Start: 2024-06-04

## 2024-06-25 ENCOUNTER — TELEPHONE (OUTPATIENT)
Dept: HEMATOLOGY/ONCOLOGY | Facility: HOSPITAL | Age: 74
End: 2024-06-25
Payer: MEDICARE

## 2024-06-25 NOTE — TELEPHONE ENCOUNTER
Patient scheduled for HEM ONC ESTABLISHED PT NEW PHYSICIAN visit with Dr. Esteves on Thursday, 7/11/24 at 10:30 AM. Due to provider being out of office, appointment needs to be rescheduled. Reached out to patient to reschedule appointment. Patient agreeable. Rescheduled to Thursday, 7/18/24 at 10 AM. Patient verbalized understanding and agreed to appointment change.

## 2024-07-03 PROBLEM — R51.9 HEADACHE: Status: RESOLVED | Noted: 2023-12-15 | Resolved: 2024-07-03

## 2024-07-03 PROBLEM — M54.50 ACUTE LOW BACK PAIN: Status: RESOLVED | Noted: 2023-01-25 | Resolved: 2024-07-03

## 2024-07-03 PROBLEM — M85.80 OTHER SPECIFIED DISORDERS OF BONE DENSITY AND STRUCTURE, UNSPECIFIED SITE: Status: RESOLVED | Noted: 2023-01-04 | Resolved: 2024-07-03

## 2024-07-03 PROBLEM — G89.29 OTHER CHRONIC PAIN: Status: RESOLVED | Noted: 2022-07-25 | Resolved: 2024-07-03

## 2024-07-03 PROBLEM — M54.9 BACK PAIN: Status: RESOLVED | Noted: 2023-12-15 | Resolved: 2024-07-03

## 2024-07-03 PROBLEM — S37.009A INJURY OF KIDNEY: Status: RESOLVED | Noted: 2023-12-15 | Resolved: 2024-07-03

## 2024-07-03 PROBLEM — E83.52 HYPERCALCEMIA: Status: RESOLVED | Noted: 2023-03-02 | Resolved: 2024-07-03

## 2024-07-03 PROBLEM — R41.3 MEMORY DIFFICULTY: Status: RESOLVED | Noted: 2023-12-15 | Resolved: 2024-07-03

## 2024-07-03 PROBLEM — E83.51 HYPOCALCEMIA: Status: RESOLVED | Noted: 2022-10-04 | Resolved: 2024-07-03

## 2024-07-03 PROBLEM — N39.0 URINARY TRACT INFECTION: Status: RESOLVED | Noted: 2023-12-15 | Resolved: 2024-07-03

## 2024-07-03 PROBLEM — I26.99 ACUTE PULMONARY EMBOLISM (MULTI): Status: RESOLVED | Noted: 2023-12-15 | Resolved: 2024-07-03

## 2024-07-03 PROBLEM — R60.0 LOCALIZED EDEMA: Status: RESOLVED | Noted: 2021-11-19 | Resolved: 2024-07-03

## 2024-07-03 PROBLEM — R31.9 HEMATURIA: Status: RESOLVED | Noted: 2022-10-27 | Resolved: 2024-07-03

## 2024-07-03 PROBLEM — J45.20 MILD INTERMITTENT ASTHMA (HHS-HCC): Status: RESOLVED | Noted: 2023-01-25 | Resolved: 2024-07-03

## 2024-07-03 PROBLEM — D35.01: Status: RESOLVED | Noted: 2022-09-07 | Resolved: 2024-07-03

## 2024-07-03 PROBLEM — I26.99 ACUTE PULMONARY EMBOLISM WITHOUT ACUTE COR PULMONALE (MULTI): Status: RESOLVED | Noted: 2023-12-15 | Resolved: 2024-07-03

## 2024-07-03 PROBLEM — I82.509 CHRONIC DEEP VEIN THROMBOSIS (DVT) OF LOWER EXTREMITY (MULTI): Status: RESOLVED | Noted: 2023-01-25 | Resolved: 2024-07-03

## 2024-07-03 PROBLEM — M81.0 OSTEOPOROSIS: Status: RESOLVED | Noted: 2023-01-25 | Resolved: 2024-07-03

## 2024-07-03 PROBLEM — D64.9 ANEMIA: Status: RESOLVED | Noted: 2023-01-25 | Resolved: 2024-07-03

## 2024-07-03 PROBLEM — G43.009 HEADACHE, COMMON MIGRAINE: Status: RESOLVED | Noted: 2023-06-09 | Resolved: 2024-07-03

## 2024-07-03 PROBLEM — M54.2 NECK PAIN ON RIGHT SIDE: Status: RESOLVED | Noted: 2023-12-15 | Resolved: 2024-07-03

## 2024-07-03 NOTE — PROGRESS NOTES
Subjective     HPI   Soni Cleveland is a 74 y.o. year old female patient with presenting to clinic with concern for   Chief Complaint   Patient presents with   • Follow-up     3 mth. Discuss bw results.        3 month follow up  Discussed lab results    Advance Care Planning  - none  Advance Care Planning was discussed with patient and family. Advanced directives and POA and the patient's Advance Care Plan can be documented in the EMR when/if they have plans in order and provide paperwork    Labs recently done- Iron deficiency anemia. I do recommend daily iron supplements.  Kidney function is at baseline, unchanged has seen Dr Lou nephrology in the past    Labs are acceptable including blood counts,  liver function, thyroid testing, vitamin D, vitamin B12, and cholesterol.    Asthma- doesn't use albuterol inhaler. Rests often.    Depression-  Changed from cymbalta bid to every day.  Today we will discuss alternative depression options. This may be contributing to fatigue      After Last visit- I called back and told Soni 10 year follow up from 2019 Dr Castelan. Not due now. I looked into this more thoroughly and she had no polyps. The diagnostic colonoscopy was for bleeding and found diverticulosis.     Sister recently had colon cancer. NOW due 5 yrs.   Soni has noted pencil calibur stools and urge to defecate with minimal Bms produced.  Needs colonoscopy.    [facility] new visit Dr. Esteves on Thursday, 7/11/24  iron deficiency anemia    Cardiology  Dr Diaz    CKD Dr Lou     ENT- Dr Lloyd  Audiology Sylvia Lombardo- sensorineural hearing loss    NonCompliant with CPAP- she does not use her CPAP. Uncomfortable. Tried variations without benefit.      DVT- PE July 2021- on eliquis      Jayjay due in Sept    Rheumatology Dr Kwok -  Alopecia,  RAVI+, +SSA      Ophtho- Dr Colon      Patient Active Problem List   Diagnosis   • Abnormal EKG   • Abnormally compliant left middle ear system with type AD tympanogram curve    • Adrenal nodule (Multi)   • Agatston coronary artery calcium score less than 100   • Age-related nuclear cataract, bilateral   • Allergic reaction   • Angina pectoris (CMS-HCC)   • Asymmetric SNHL (sensorineural hearing loss)   • Primary hypertension   • Chronic fatigue   • Class 2 severe obesity due to excess calories with serious comorbidity and body mass index (BMI) of 39.0 to 39.9 in adult (Multi)   • Depression with anxiety   • Dry eyes   • Dyslipidemia   • Dysphagia   • Gait abnormality   • Heart palpitations   • Mild intermittent asthma without complication ([facility])   • Neck pain   • Obstructive sleep apnea   • Posterior vitreous detachment of both eyes   • Presbyacusis   • Pulmonary embolism (Multi)   • Renal cyst   • Restless legs syndrome   • Stage 3b chronic kidney disease (Multi)   • Stress incontinence in female   • Tinnitus of both ears   • Tubular adenoma of colon   • Vitamin D deficiency   • Vitreous floaters   • Decreased hearing   • Chronic left-sided low back pain with left-sided sciatica   • Mild ascending aorta dilatation (CMS-HCC)   • Age-related osteoporosis without current pathological fracture   • Gastrointestinal hemorrhage   • Bright red rectal bleeding   • Shortness of breath   • Adrenal mass 1 cm to 4 cm in diameter (Multi)   • Vertigo   • Posterior vitreous detachment   • Osteopenia   • Nuclear senile cataract   • Migraine   • Metabolic syndrome   • Memory impairment   • History of pulmonary embolism   • Gastrointestinal hemorrhage with melena   • Elevated cholesterol   • Diastolic heart failure (Multi)   • Deep vein thrombosis (DVT) (Multi)   • Cough   • Nonscarring hair loss, unspecified   • Alopecia   • Adenoma of right adrenal gland   • Pulmonary hypertension (Multi)   • Polyneuropathy, unspecified   • Asymptomatic postmenopausal state   • Cervical dysphagia   • Cystic kidney disease, unspecified   • Deficiency of other specified B group vitamins   • Iron deficiency anemia,  unspecified   • Polyp of colon   • Disorder of phosphorus metabolism, unspecified   • Hypertensive heart and chronic kidney disease with heart failure and stage 1 through stage 4 chronic kidney disease, or unspecified chronic kidney disease (Multi)   • Fatty (change of) liver, not elsewhere classified   • H/O high risk medication treatment       Past Medical History:   Diagnosis Date   • Acute pulmonary embolism (Multi) 12/15/2023   • Acute pulmonary embolism without acute cor pulmonale (Multi) 12/15/2023   • Anxiety    • Asymmetrical sensorineural hearing loss 03/09/2018    Asymmetrical left sensorineural hearing loss   • Chronic deep vein thrombosis (DVT) of lower extremity (Multi) 01/25/2023   • Chronic fatigue 03/24/2021    History of chronic fatigue   • Chronic kidney disease    • Chronic kidney disease, stage 3a (Multi) 02/19/2021    now Stage 3B CKD  Aug 2023   • Clotting disorder (Multi) 5/07/2019   • Depression    • Fall 01/25/2023    Subsequent encounter   • GI bleeding 01/25/2023   • Gross hematuria 01/25/2023   • Hypertension    • Impacted cerumen, right ear 02/27/2018    Impacted cerumen of right ear   • Tinnitus, bilateral 08/18/2020    Tinnitus, bilateral   • Urinary tract infection 07/08/2021   • Visual impairment       Past Surgical History:   Procedure Laterality Date   • APPENDECTOMY  03/19/2015    Appendectomy   • CARDIAC CATHETERIZATION  December 2018   • HYSTERECTOMY  03/19/2015    Hysterectomy   • MR HEAD ANGIO WO IV CONTRAST  02/21/2012    MR HEAD ANGIO WO IV CONTRAST [facility]   • MR NECK ANGIO WO IV CONTRAST  02/21/2012    MR NECK ANGIO WO IV CONTRAST [facility]   • TONSILLECTOMY  03/19/2015    Tonsillectomy      Family History   Problem Relation Name Age of Onset   • Cirrhosis Mother     • Stroke Father Johan         CVA   • Heart failure Father Johan         Congestive   • Gout Father Johan    • Heart attack Father Johan         Myocardial infarction   • Heart  disease Father Johan    • Colon cancer Sister Miguel (colon cancer)    • Cancer Sister Miguel (colon cancer)    • COPD Brother Jayesh    • Multiple sclerosis Sister Danelle       Social History     Tobacco Use   • Smoking status: Former     Current packs/day: 0.00     Average packs/day: 1 pack/day for 20.0 years (20.0 ttl pk-yrs)     Types: Cigarettes     Start date: 1970     Quit date: 1980     Years since quittin.1   • Smokeless tobacco: Never   • Tobacco comments:     Was a smoker for about 10 yrs   Substance Use Topics   • Alcohol use: Not Currently        Current Outpatient Medications:   •  acetaminophen (Tylenol) 325 mg tablet, Take 2 tablets (650 mg) by mouth every 6 hours if needed., Disp: , Rfl:   •  apixaban (Eliquis) 5 mg tablet, Take 1 tablet (5 mg) by mouth every 12 hours., Disp: , Rfl:   •  artificial tears, dextran-hypomel-glycerin, 0.1-0.3-0.2 % ophthalmic solution, Administer into affected eye(s)., Disp: , Rfl:   •  atorvastatin (Lipitor) 40 mg tablet, Take 1 tablet (40 mg) by mouth once daily at bedtime., Disp: 90 tablet, Rfl: 3  •  bisoprolol (Zebeta) 5 mg tablet, Take 0.5 tablets (2.5 mg) by mouth once daily., Disp: 45 tablet, Rfl: 3  •  calcium carbonate-vitamin D3 (Oyster Shell) 250 mg-3.125 mcg (125 unit) tablet, Take 1 tablet by mouth 2 times a day with meals., Disp: 180 tablet, Rfl: 3  •  cholecalciferol (Vitamin D-3) 25 MCG (1000 UT) tablet, Take 2 tablets (2,000 Units) by mouth once daily., Disp: , Rfl:   •  denosumab (Prolia) 60 mg/mL syringe, Prolia, Disp: , Rfl:   •  DULoxetine (Cymbalta) 30 mg DR capsule, Take 1 capsule (30 mg) by mouth once daily., Disp: 90 capsule, Rfl: 0  •  ferrous sulfate, 325 mg ferrous sulfate, tablet, Take 1 tablet by mouth once daily with breakfast., Disp: 90 tablet, Rfl: 3  •  HAIR, SKIN AND NAILS, BIOTIN, ORAL, Take by mouth once daily., Disp: , Rfl:   •  ketoconazole (NIZOral) 2 % cream, , Disp: , Rfl:   •  lisinopril 10 mg tablet, Take 1 tablet  "(10 mg) by mouth once daily., Disp: 90 tablet, Rfl: 3  •  minoxidil (Rogaine) 2 % external solution, Apply topically 2 times a day., Disp: , Rfl:   •  nitroglycerin (Nitrostat) 0.4 mg SL tablet, DISSOLVE 1 TABLET UNDER THE TONGUE EVERY 5 MINUTES FOR UP TO 3 DOSES AS NEEDED FOR CHEST PAIN.CALL 911 IF PAIN PERSISTS., Disp: , Rfl:   •  triamcinolone (Kenalog) 0.1 % cream, Apply topically 2 times a day. Apply to affected area 1-2 times daily as needed. Avoid face and groin., Disp: 30 g, Rfl: 0  •  escitalopram (Lexapro) 10 mg tablet, Take 1 tablet (10 mg) by mouth once daily., Disp: 90 tablet, Rfl: 1     Review of Systems  Constitutional: Denies fever  HEENT: Denies ST, earache  CVS: Denies Chest pain  Pulmonary: Denies wheezing, SOB  GI: Denies N/V  : Denies dysuria  Musculoskeletal:  Denies myalgia  Neuro: Denies focal weakness or numbness.  Skin: Denies Rashes.  *Review of Systems is negative unless otherwise mentioned in HPI or ROS above.    Objective   /70   Pulse 62   Temp 35.9 °C (96.6 °F)   Ht 1.575 m (5' 2\")   Wt 95.9 kg (211 lb 6.4 oz)   SpO2 97%   BMI 38.67 kg/m²  reviewed Body mass index is 38.67 kg/m².     Physical Exam  Constitutional: NAD.  Resting comfortably.  Head: Atraumatic, normocephalic.  ENT: Moist oral mucosa. Nasal mucosa wnl.   Cardiac: Regular rate & rhythm.   Pulmonary: Lungs clear bilat  GI: Soft, Nontender, nondistended.   Musculoskeletal: No peripheral edema.   Skin: No evidence of trauma. No rashes  Psych: Intact judgement and insight.    .Assessment/Plan   Problem List Items Addressed This Visit             ICD-10-CM    Adrenal nodule (Multi) E27.8    Angina pectoris (CMS-HCC) I20.9     Sees Dr Diaz         Class 2 severe obesity due to excess calories with serious comorbidity and body mass index (BMI) of 39.0 to 39.9 in adult (Multi) E66.01, Z68.39     Discussed weight loss. Dietary recommendations and increased physical activity           Mild intermittent asthma without " complication ([facility]-H[facility]) J45.20     Well controlled         Stage 3b chronic kidney disease (Multi) N18.32     Discussed and reviewed in recent labwork. Has seen Dr Lou in the past  Began after UTI hospitalization         Pulmonary hypertension (Multi) I27.20     Monika Cruz          Other Visit Diagnoses         Codes    Change in stool caliber    -  Primary R19.5    Relevant Orders    Colonoscopy Screening; Average Risk Patient    Anxiety and depression     F41.9, F32.A    Relevant Medications    escitalopram (Lexapro) 10 mg tablet    Screening mammogram for breast cancer     Z12.31    Relevant Orders    BI mammo bilateral screening tomosynthesis

## 2024-07-03 NOTE — ASSESSMENT & PLAN NOTE
NonCompliant with CPAP- she does not use her CPAP. Uncomfortable. Tried variations without benefit.

## 2024-07-05 ENCOUNTER — APPOINTMENT (OUTPATIENT)
Dept: PRIMARY CARE | Facility: CLINIC | Age: 74
End: 2024-07-05
Payer: MEDICARE

## 2024-07-05 VITALS
OXYGEN SATURATION: 97 % | SYSTOLIC BLOOD PRESSURE: 110 MMHG | DIASTOLIC BLOOD PRESSURE: 70 MMHG | HEIGHT: 62 IN | WEIGHT: 211.4 LBS | BODY MASS INDEX: 38.9 KG/M2 | TEMPERATURE: 96.6 F | HEART RATE: 62 BPM

## 2024-07-05 DIAGNOSIS — R19.5 CHANGE IN STOOL CALIBER: Primary | ICD-10-CM

## 2024-07-05 DIAGNOSIS — J45.20 MILD INTERMITTENT ASTHMA WITHOUT COMPLICATION (HHS-HCC): ICD-10-CM

## 2024-07-05 DIAGNOSIS — F32.A ANXIETY AND DEPRESSION: ICD-10-CM

## 2024-07-05 DIAGNOSIS — Z12.31 SCREENING MAMMOGRAM FOR BREAST CANCER: ICD-10-CM

## 2024-07-05 DIAGNOSIS — I20.9 ANGINA PECTORIS (CMS-HCC): ICD-10-CM

## 2024-07-05 DIAGNOSIS — F41.9 ANXIETY AND DEPRESSION: ICD-10-CM

## 2024-07-05 DIAGNOSIS — E27.8 ADRENAL NODULE (MULTI): ICD-10-CM

## 2024-07-05 DIAGNOSIS — E66.01 CLASS 2 SEVERE OBESITY DUE TO EXCESS CALORIES WITH SERIOUS COMORBIDITY AND BODY MASS INDEX (BMI) OF 39.0 TO 39.9 IN ADULT (MULTI): ICD-10-CM

## 2024-07-05 DIAGNOSIS — N18.32 STAGE 3B CHRONIC KIDNEY DISEASE (MULTI): ICD-10-CM

## 2024-07-05 DIAGNOSIS — I27.20 PULMONARY HYPERTENSION (MULTI): ICD-10-CM

## 2024-07-05 PROCEDURE — 1160F RVW MEDS BY RX/DR IN RCRD: CPT | Performed by: PHYSICIAN ASSISTANT

## 2024-07-05 PROCEDURE — 1036F TOBACCO NON-USER: CPT | Performed by: PHYSICIAN ASSISTANT

## 2024-07-05 PROCEDURE — 3074F SYST BP LT 130 MM HG: CPT | Performed by: PHYSICIAN ASSISTANT

## 2024-07-05 PROCEDURE — 99214 OFFICE O/P EST MOD 30 MIN: CPT | Performed by: PHYSICIAN ASSISTANT

## 2024-07-05 PROCEDURE — 3008F BODY MASS INDEX DOCD: CPT | Performed by: PHYSICIAN ASSISTANT

## 2024-07-05 PROCEDURE — 1159F MED LIST DOCD IN RCRD: CPT | Performed by: PHYSICIAN ASSISTANT

## 2024-07-05 PROCEDURE — 1124F ACP DISCUSS-NO DSCNMKR DOCD: CPT | Performed by: PHYSICIAN ASSISTANT

## 2024-07-05 PROCEDURE — 3078F DIAST BP <80 MM HG: CPT | Performed by: PHYSICIAN ASSISTANT

## 2024-07-05 RX ORDER — ESCITALOPRAM OXALATE 10 MG/1
10 TABLET ORAL DAILY
Qty: 30 TABLET | Refills: 5 | Status: SHIPPED | OUTPATIENT
Start: 2024-07-05 | End: 2024-07-05

## 2024-07-05 RX ORDER — ESCITALOPRAM OXALATE 10 MG/1
10 TABLET ORAL DAILY
Qty: 90 TABLET | Refills: 1 | Status: SHIPPED | OUTPATIENT
Start: 2024-07-05

## 2024-07-05 NOTE — ASSESSMENT & PLAN NOTE
Discussed and reviewed in recent labwork. Has seen Dr Lou in the past  Began after UTI hospitalization

## 2024-07-09 ENCOUNTER — APPOINTMENT (OUTPATIENT)
Dept: HEMATOLOGY/ONCOLOGY | Facility: HOSPITAL | Age: 74
End: 2024-07-09
Payer: MEDICARE

## 2024-07-10 ENCOUNTER — APPOINTMENT (OUTPATIENT)
Dept: HEMATOLOGY/ONCOLOGY | Facility: HOSPITAL | Age: 74
End: 2024-07-10
Payer: MEDICARE

## 2024-07-11 ENCOUNTER — APPOINTMENT (OUTPATIENT)
Dept: HEMATOLOGY/ONCOLOGY | Facility: HOSPITAL | Age: 74
End: 2024-07-11
Payer: MEDICARE

## 2024-07-17 ENCOUNTER — LAB (OUTPATIENT)
Dept: LAB | Facility: LAB | Age: 74
End: 2024-07-17
Payer: MEDICARE

## 2024-07-17 DIAGNOSIS — E83.42 HYPOMAGNESEMIA: ICD-10-CM

## 2024-07-17 DIAGNOSIS — D64.9 ANEMIA, UNSPECIFIED TYPE: ICD-10-CM

## 2024-07-17 DIAGNOSIS — E61.1 IRON DEFICIENCY: ICD-10-CM

## 2024-07-17 DIAGNOSIS — M81.0 AGE-RELATED OSTEOPOROSIS WITHOUT CURRENT PATHOLOGICAL FRACTURE: ICD-10-CM

## 2024-07-17 DIAGNOSIS — I26.99 PULMONARY EMBOLISM, OTHER, UNSPECIFIED CHRONICITY, UNSPECIFIED WHETHER ACUTE COR PULMONALE PRESENT (MULTI): ICD-10-CM

## 2024-07-17 LAB
BASOPHILS # BLD AUTO: 0.06 X10*3/UL (ref 0–0.1)
BASOPHILS NFR BLD AUTO: 0.8 %
EOSINOPHIL # BLD AUTO: 0.19 X10*3/UL (ref 0–0.4)
EOSINOPHIL NFR BLD AUTO: 2.5 %
ERYTHROCYTE [DISTWIDTH] IN BLOOD BY AUTOMATED COUNT: 14 % (ref 11.5–14.5)
HCT VFR BLD AUTO: 40.6 % (ref 36–46)
HGB BLD-MCNC: 12.7 G/DL (ref 12–16)
IMM GRANULOCYTES # BLD AUTO: 0.02 X10*3/UL (ref 0–0.5)
IMM GRANULOCYTES NFR BLD AUTO: 0.3 % (ref 0–0.9)
LYMPHOCYTES # BLD AUTO: 2.2 X10*3/UL (ref 0.8–3)
LYMPHOCYTES NFR BLD AUTO: 28.8 %
MCH RBC QN AUTO: 28.2 PG (ref 26–34)
MCHC RBC AUTO-ENTMCNC: 31.3 G/DL (ref 32–36)
MCV RBC AUTO: 90 FL (ref 80–100)
MONOCYTES # BLD AUTO: 0.85 X10*3/UL (ref 0.05–0.8)
MONOCYTES NFR BLD AUTO: 11.1 %
NEUTROPHILS # BLD AUTO: 4.33 X10*3/UL (ref 1.6–5.5)
NEUTROPHILS NFR BLD AUTO: 56.5 %
NRBC BLD-RTO: 0 /100 WBCS (ref 0–0)
PLATELET # BLD AUTO: 233 X10*3/UL (ref 150–450)
RBC # BLD AUTO: 4.51 X10*6/UL (ref 4–5.2)
WBC # BLD AUTO: 7.7 X10*3/UL (ref 4.4–11.3)

## 2024-07-17 PROCEDURE — 82728 ASSAY OF FERRITIN: CPT

## 2024-07-17 PROCEDURE — 80053 COMPREHEN METABOLIC PANEL: CPT

## 2024-07-17 PROCEDURE — 36415 COLL VENOUS BLD VENIPUNCTURE: CPT

## 2024-07-17 PROCEDURE — 83735 ASSAY OF MAGNESIUM: CPT

## 2024-07-17 PROCEDURE — 82330 ASSAY OF CALCIUM: CPT

## 2024-07-17 PROCEDURE — 83540 ASSAY OF IRON: CPT

## 2024-07-17 PROCEDURE — 85025 COMPLETE CBC W/AUTO DIFF WBC: CPT

## 2024-07-17 PROCEDURE — 83550 IRON BINDING TEST: CPT

## 2024-07-18 ENCOUNTER — INFUSION (OUTPATIENT)
Dept: HEMATOLOGY/ONCOLOGY | Facility: HOSPITAL | Age: 74
End: 2024-07-18
Payer: MEDICARE

## 2024-07-18 ENCOUNTER — OFFICE VISIT (OUTPATIENT)
Dept: HEMATOLOGY/ONCOLOGY | Facility: HOSPITAL | Age: 74
End: 2024-07-18
Payer: MEDICARE

## 2024-07-18 VITALS
RESPIRATION RATE: 16 BRPM | OXYGEN SATURATION: 95 % | TEMPERATURE: 95.5 F | WEIGHT: 210.54 LBS | HEART RATE: 54 BPM | SYSTOLIC BLOOD PRESSURE: 101 MMHG | DIASTOLIC BLOOD PRESSURE: 67 MMHG | BODY MASS INDEX: 38.74 KG/M2 | HEIGHT: 62 IN

## 2024-07-18 DIAGNOSIS — D50.9 IRON DEFICIENCY ANEMIA, UNSPECIFIED IRON DEFICIENCY ANEMIA TYPE: Primary | ICD-10-CM

## 2024-07-18 DIAGNOSIS — D64.9 ANEMIA, UNSPECIFIED TYPE: ICD-10-CM

## 2024-07-18 DIAGNOSIS — H91.13 PRESBYCUSIS OF BOTH EARS: ICD-10-CM

## 2024-07-18 DIAGNOSIS — E61.1 IRON DEFICIENCY: ICD-10-CM

## 2024-07-18 DIAGNOSIS — E83.42 HYPOMAGNESEMIA: ICD-10-CM

## 2024-07-18 DIAGNOSIS — M81.0 AGE-RELATED OSTEOPOROSIS WITHOUT CURRENT PATHOLOGICAL FRACTURE: ICD-10-CM

## 2024-07-18 DIAGNOSIS — I26.99 PULMONARY EMBOLISM, OTHER, UNSPECIFIED CHRONICITY, UNSPECIFIED WHETHER ACUTE COR PULMONALE PRESENT (MULTI): ICD-10-CM

## 2024-07-18 LAB
ALBUMIN SERPL BCP-MCNC: 4 G/DL (ref 3.4–5)
ALP SERPL-CCNC: 84 U/L (ref 33–136)
ALT SERPL W P-5'-P-CCNC: 22 U/L (ref 7–45)
ANION GAP SERPL CALC-SCNC: 15 MMOL/L (ref 10–20)
AST SERPL W P-5'-P-CCNC: 19 U/L (ref 9–39)
BILIRUB SERPL-MCNC: 0.3 MG/DL (ref 0–1.2)
BUN SERPL-MCNC: 21 MG/DL (ref 6–23)
CA-I BLD-SCNC: 1.24 MMOL/L (ref 1.1–1.33)
CALCIUM SERPL-MCNC: 9.4 MG/DL (ref 8.6–10.3)
CHLORIDE SERPL-SCNC: 105 MMOL/L (ref 98–107)
CO2 SERPL-SCNC: 27 MMOL/L (ref 21–32)
CREAT SERPL-MCNC: 1.31 MG/DL (ref 0.5–1.05)
EGFRCR SERPLBLD CKD-EPI 2021: 43 ML/MIN/1.73M*2
FERRITIN SERPL-MCNC: 202 NG/ML (ref 8–150)
GLUCOSE SERPL-MCNC: 97 MG/DL (ref 74–99)
IRON SATN MFR SERPL: 15 % (ref 25–45)
IRON SERPL-MCNC: 58 UG/DL (ref 35–150)
MAGNESIUM SERPL-MCNC: 2.09 MG/DL (ref 1.6–2.4)
POTASSIUM SERPL-SCNC: 4.5 MMOL/L (ref 3.5–5.3)
PROT SERPL-MCNC: 7 G/DL (ref 6.4–8.2)
SODIUM SERPL-SCNC: 142 MMOL/L (ref 136–145)
TIBC SERPL-MCNC: 378 UG/DL (ref 240–445)
UIBC SERPL-MCNC: 320 UG/DL (ref 110–370)

## 2024-07-18 PROCEDURE — 1126F AMNT PAIN NOTED NONE PRSNT: CPT | Performed by: INTERNAL MEDICINE

## 2024-07-18 PROCEDURE — 96372 THER/PROPH/DIAG INJ SC/IM: CPT

## 2024-07-18 PROCEDURE — 2500000004 HC RX 250 GENERAL PHARMACY W/ HCPCS (ALT 636 FOR OP/ED): Mod: JZ,TB | Performed by: PHYSICIAN ASSISTANT

## 2024-07-18 PROCEDURE — 3074F SYST BP LT 130 MM HG: CPT | Performed by: INTERNAL MEDICINE

## 2024-07-18 PROCEDURE — 1159F MED LIST DOCD IN RCRD: CPT | Performed by: INTERNAL MEDICINE

## 2024-07-18 PROCEDURE — 3008F BODY MASS INDEX DOCD: CPT | Performed by: INTERNAL MEDICINE

## 2024-07-18 PROCEDURE — 3078F DIAST BP <80 MM HG: CPT | Performed by: INTERNAL MEDICINE

## 2024-07-18 PROCEDURE — 99214 OFFICE O/P EST MOD 30 MIN: CPT | Performed by: INTERNAL MEDICINE

## 2024-07-18 RX ORDER — FAMOTIDINE 10 MG/ML
20 INJECTION INTRAVENOUS ONCE AS NEEDED
OUTPATIENT
Start: 2025-01-02

## 2024-07-18 RX ORDER — DIPHENHYDRAMINE HYDROCHLORIDE 50 MG/ML
50 INJECTION INTRAMUSCULAR; INTRAVENOUS AS NEEDED
OUTPATIENT
Start: 2025-01-02

## 2024-07-18 RX ORDER — EPINEPHRINE 0.3 MG/.3ML
0.3 INJECTION SUBCUTANEOUS EVERY 5 MIN PRN
OUTPATIENT
Start: 2025-01-02

## 2024-07-18 RX ORDER — ALBUTEROL SULFATE 0.83 MG/ML
3 SOLUTION RESPIRATORY (INHALATION) AS NEEDED
OUTPATIENT
Start: 2025-01-02

## 2024-07-18 SDOH — ECONOMIC STABILITY: TRANSPORTATION INSECURITY
IN THE PAST 12 MONTHS, HAS LACK OF TRANSPORTATION KEPT YOU FROM MEETINGS, WORK, OR FROM GETTING THINGS NEEDED FOR DAILY LIVING?: NO

## 2024-07-18 SDOH — ECONOMIC STABILITY: TRANSPORTATION INSECURITY
IN THE PAST 12 MONTHS, HAS THE LACK OF TRANSPORTATION KEPT YOU FROM MEDICAL APPOINTMENTS OR FROM GETTING MEDICATIONS?: NO

## 2024-07-18 ASSESSMENT — PATIENT HEALTH QUESTIONNAIRE - PHQ9
7. TROUBLE CONCENTRATING ON THINGS, SUCH AS READING THE NEWSPAPER OR WATCHING TELEVISION: NOT AT ALL
SUM OF ALL RESPONSES TO PHQ9 QUESTIONS 1 AND 2: 6
4. FEELING TIRED OR HAVING LITTLE ENERGY: NEARLY EVERY DAY
8. MOVING OR SPEAKING SO SLOWLY THAT OTHER PEOPLE COULD HAVE NOTICED. OR THE OPPOSITE, BEING SO FIGETY OR RESTLESS THAT YOU HAVE BEEN MOVING AROUND A LOT MORE THAN USUAL: NOT AT ALL
6. FEELING BAD ABOUT YOURSELF - OR THAT YOU ARE A FAILURE OR HAVE LET YOURSELF OR YOUR FAMILY DOWN: MORE THAN HALF THE DAYS
SUM OF ALL RESPONSES TO PHQ QUESTIONS 1-9: 14
3. TROUBLE FALLING OR STAYING ASLEEP OR SLEEPING TOO MUCH: MORE THAN HALF THE DAYS
10. IF YOU CHECKED OFF ANY PROBLEMS, HOW DIFFICULT HAVE THESE PROBLEMS MADE IT FOR YOU TO DO YOUR WORK, TAKE CARE OF THINGS AT HOME, OR GET ALONG WITH OTHER PEOPLE: NOT DIFFICULT AT ALL
5. POOR APPETITE OR OVEREATING: SEVERAL DAYS
9. THOUGHTS THAT YOU WOULD BE BETTER OFF DEAD, OR OF HURTING YOURSELF: NOT AT ALL
2. FEELING DOWN, DEPRESSED OR HOPELESS: NEARLY EVERY DAY
1. LITTLE INTEREST OR PLEASURE IN DOING THINGS: NEARLY EVERY DAY

## 2024-07-18 ASSESSMENT — LIFESTYLE VARIABLES
HOW OFTEN DO YOU HAVE A DRINK CONTAINING ALCOHOL: NEVER
AUDIT-C TOTAL SCORE: 0
HOW MANY STANDARD DRINKS CONTAINING ALCOHOL DO YOU HAVE ON A TYPICAL DAY: PATIENT DOES NOT DRINK
SKIP TO QUESTIONS 9-10: 1
HOW OFTEN DO YOU HAVE SIX OR MORE DRINKS ON ONE OCCASION: NEVER

## 2024-07-18 ASSESSMENT — ENCOUNTER SYMPTOMS
OCCASIONAL FEELINGS OF UNSTEADINESS: 1
DEPRESSION: 1
LOSS OF SENSATION IN FEET: 0
GASTROINTESTINAL NEGATIVE: 1
CARDIOVASCULAR NEGATIVE: 1
CONSTITUTIONAL NEGATIVE: 1

## 2024-07-18 ASSESSMENT — SOCIAL DETERMINANTS OF HEALTH (SDOH)
WITHIN THE LAST YEAR, HAVE YOU BEEN HUMILIATED OR EMOTIONALLY ABUSED IN OTHER WAYS BY YOUR PARTNER OR EX-PARTNER?: NO
WITHIN THE LAST YEAR, HAVE YOU BEEN KICKED, HIT, SLAPPED, OR OTHERWISE PHYSICALLY HURT BY YOUR PARTNER OR EX-PARTNER?: NO
HOW HARD IS IT FOR YOU TO PAY FOR THE VERY BASICS LIKE FOOD, HOUSING, MEDICAL CARE, AND HEATING?: NOT VERY HARD
WITHIN THE LAST YEAR, HAVE YOU BEEN AFRAID OF YOUR PARTNER OR EX-PARTNER?: NO
WITHIN THE LAST YEAR, HAVE TO BEEN RAPED OR FORCED TO HAVE ANY KIND OF SEXUAL ACTIVITY BY YOUR PARTNER OR EX-PARTNER?: NO

## 2024-07-18 ASSESSMENT — PAIN SCALES - GENERAL: PAINLEVEL: 0-NO PAIN

## 2024-07-18 NOTE — PROGRESS NOTES
"Patient ID: Soni Cleveland is a 74 y.o. female.    Subjective:  Returns for follow up for pulmonary emboli. Has had B/L PE in July 2021 that were not provoked. Hhas not had VTEs before or after that. Has been on Eliquis 5 mg PO BID since then.     Complains of easy brusing. Denies blood in urine or stool. Denies dyspnea or chest pain.     Assessment/Plan:  ? Pulmoanry emboli: B/L in Jul 2021. Unprovoked. Needs lifelong anti-coagulation. However, we will reduce the dose to prophylactic dose of 2.5 mg po bid. Pt concurs.     ? Anemia: Was mild with macrocytosis. But now resolved. Last Hb is 12.7 No need for work-up.     We could discharge her from our clinic but she received Prolia every 6 months for osteopenia => We will keep seeing her annually.     Review Of Systems:  Review of Systems   Constitutional: Negative.    HENT:  Negative.     Cardiovascular: Negative.    Gastrointestinal: Negative.        Physical Exam:  /67 (BP Location: Left arm, Patient Position: Sitting, BP Cuff Size: Adult)   Pulse 54   Temp 35.3 °C (95.5 °F) (Temporal)   Resp 16   Ht 1.575 m (5' 2\")   Wt 95.5 kg (210 lb 8.6 oz)   SpO2 95%   BMI 38.51 kg/m²   BSA: 2.04 meters squared  Performance Status: Symptomatic; fully ambulatory  Physical Exam  Constitutional:       Appearance: Normal appearance.   HENT:      Head: Normocephalic.   Cardiovascular:      Rate and Rhythm: Normal rate and regular rhythm.   Pulmonary:      Effort: Pulmonary effort is normal.   Abdominal:      General: Abdomen is flat.   Skin:     Coloration: Skin is not jaundiced.   Neurological:      Mental Status: She is alert.         Results:  Diagnostic Results   Lab Results   Component Value Date    WBC 7.7 07/17/2024    HGB 12.7 07/17/2024    HCT 40.6 07/17/2024    MCV 90 07/17/2024     07/17/2024     Lab Results   Component Value Date    CALCIUM 9.4 07/17/2024     07/17/2024    K 4.5 07/17/2024    CO2 27 07/17/2024     07/17/2024    BUN 21 " 07/17/2024    CREATININE 1.31 (H) 07/17/2024    ALT 22 07/17/2024    AST 19 07/17/2024       Current Outpatient Medications:     acetaminophen (Tylenol) 325 mg tablet, Take 2 tablets (650 mg) by mouth every 6 hours if needed., Disp: , Rfl:     artificial tears, dextran-hypomel-glycerin, 0.1-0.3-0.2 % ophthalmic solution, Administer into affected eye(s)., Disp: , Rfl:     atorvastatin (Lipitor) 40 mg tablet, Take 1 tablet (40 mg) by mouth once daily at bedtime., Disp: 90 tablet, Rfl: 3    bisoprolol (Zebeta) 5 mg tablet, Take 0.5 tablets (2.5 mg) by mouth once daily., Disp: 45 tablet, Rfl: 3    calcium carbonate-vitamin D3 (Oyster Shell) 250 mg-3.125 mcg (125 unit) tablet, Take 1 tablet by mouth 2 times a day with meals., Disp: 180 tablet, Rfl: 3    cholecalciferol (Vitamin D-3) 25 MCG (1000 UT) tablet, Take 2 tablets (2,000 Units) by mouth once daily., Disp: , Rfl:     denosumab (Prolia) 60 mg/mL syringe, Prolia, Disp: , Rfl:     escitalopram (Lexapro) 10 mg tablet, Take 1 tablet (10 mg) by mouth once daily., Disp: 90 tablet, Rfl: 1    ferrous sulfate, 325 mg ferrous sulfate, tablet, Take 1 tablet by mouth once daily with breakfast., Disp: 90 tablet, Rfl: 3    HAIR, SKIN AND NAILS, BIOTIN, ORAL, Take by mouth once daily., Disp: , Rfl:     ketoconazole (NIZOral) 2 % cream, , Disp: , Rfl:     lisinopril 10 mg tablet, Take 1 tablet (10 mg) by mouth once daily., Disp: 90 tablet, Rfl: 3    minoxidil (Rogaine) 2 % external solution, Apply topically 2 times a day., Disp: , Rfl:     nitroglycerin (Nitrostat) 0.4 mg SL tablet, DISSOLVE 1 TABLET UNDER THE TONGUE EVERY 5 MINUTES FOR UP TO 3 DOSES AS NEEDED FOR CHEST PAIN.CALL 911 IF PAIN PERSISTS., Disp: , Rfl:     triamcinolone (Kenalog) 0.1 % cream, Apply topically 2 times a day. Apply to affected area 1-2 times daily as needed. Avoid face and groin., Disp: 30 g, Rfl: 0    apixaban (Eliquis) 2.5 mg tablet, Take 1 tablet (2.5 mg) by mouth 2 times a day., Disp: 180 tablet,  Rfl: 3    DULoxetine (Cymbalta) 30 mg DR capsule, Take 1 capsule (30 mg) by mouth once daily., Disp: 90 capsule, Rfl: 0  No current facility-administered medications for this visit.     Past Surgical History:   Procedure Laterality Date    APPENDECTOMY  03/19/2015    Appendectomy    CARDIAC CATHETERIZATION  December 2018    HYSTERECTOMY  03/19/2015    Hysterectomy    MR HEAD ANGIO WO IV CONTRAST  02/21/2012    MR HEAD ANGIO WO IV CONTRAST [facility]    MR NECK ANGIO WO IV CONTRAST  02/21/2012    MR NECK ANGIO WO IV CONTRAST [facility]    TONSILLECTOMY  03/19/2015    Tonsillectomy     Family History   Problem Relation Name Age of Onset    Cirrhosis Mother      Stroke Father Johan         CVA    Heart failure Father Johan         Congestive    Gout Father Johan     Heart attack Father Johan         Myocardial infarction    Heart disease Father Johan     Colon cancer Sister Miguel (colon cancer)     Cancer Sister Miguel (colon cancer)     COPD Brother Jayesh     Multiple sclerosis Sister Danelle       reports that she quit smoking about 44 years ago. Her smoking use included cigarettes. She started smoking about 54 years ago. She has a 20 pack-year smoking history. She has never used smokeless tobacco.    Diagnoses and all orders for this visit:  Iron deficiency anemia, unspecified iron deficiency anemia type  -     apixaban (Eliquis) 2.5 mg tablet; Take 1 tablet (2.5 mg) by mouth 2 times a day.  -     Clinic Appointment Request; Future  -     CBC and Auto Differential; Future  -     Comprehensive Metabolic Panel; Future  -     Iron and TIBC; Future  -     Ferritin; Future  Presbycusis of both ears  -     apixaban (Eliquis) 2.5 mg tablet; Take 1 tablet (2.5 mg) by mouth 2 times a day.  -     Clinic Appointment Request; Future  -     CBC and Auto Differential; Future  -     Comprehensive Metabolic Panel; Future  -     Iron and TIBC; Future  -     Ferritin; Future       I spent more than 30 minutes for  the patient today, including face-to-face conversation, pre-visit preparation, post-visit orders, and others.   Miles Lubin MD

## 2024-07-19 DIAGNOSIS — Z12.11 ENCOUNTER FOR SCREENING FOR MALIGNANT NEOPLASM OF COLON: ICD-10-CM

## 2024-07-22 PROCEDURE — RXMED WILLOW AMBULATORY MEDICATION CHARGE

## 2024-07-24 ENCOUNTER — PHARMACY VISIT (OUTPATIENT)
Dept: PHARMACY | Facility: CLINIC | Age: 74
End: 2024-07-24
Payer: MEDICARE

## 2024-07-30 ENCOUNTER — OFFICE VISIT (OUTPATIENT)
Dept: PRIMARY CARE | Facility: CLINIC | Age: 74
End: 2024-07-30
Payer: MEDICARE

## 2024-07-30 VITALS
SYSTOLIC BLOOD PRESSURE: 128 MMHG | HEART RATE: 60 BPM | DIASTOLIC BLOOD PRESSURE: 60 MMHG | HEIGHT: 62 IN | BODY MASS INDEX: 38.94 KG/M2 | TEMPERATURE: 97 F | OXYGEN SATURATION: 95 % | WEIGHT: 211.6 LBS

## 2024-07-30 DIAGNOSIS — R19.7 DIARRHEA, UNSPECIFIED TYPE: Primary | ICD-10-CM

## 2024-07-30 PROCEDURE — 99213 OFFICE O/P EST LOW 20 MIN: CPT | Performed by: PHYSICIAN ASSISTANT

## 2024-07-30 PROCEDURE — 3008F BODY MASS INDEX DOCD: CPT | Performed by: PHYSICIAN ASSISTANT

## 2024-07-30 PROCEDURE — 1160F RVW MEDS BY RX/DR IN RCRD: CPT | Performed by: PHYSICIAN ASSISTANT

## 2024-07-30 PROCEDURE — 3074F SYST BP LT 130 MM HG: CPT | Performed by: PHYSICIAN ASSISTANT

## 2024-07-30 PROCEDURE — 1159F MED LIST DOCD IN RCRD: CPT | Performed by: PHYSICIAN ASSISTANT

## 2024-07-30 PROCEDURE — 1036F TOBACCO NON-USER: CPT | Performed by: PHYSICIAN ASSISTANT

## 2024-07-30 PROCEDURE — 3078F DIAST BP <80 MM HG: CPT | Performed by: PHYSICIAN ASSISTANT

## 2024-07-30 ASSESSMENT — PATIENT HEALTH QUESTIONNAIRE - PHQ9
SUM OF ALL RESPONSES TO PHQ9 QUESTIONS 1 AND 2: 0
1. LITTLE INTEREST OR PLEASURE IN DOING THINGS: NOT AT ALL
2. FEELING DOWN, DEPRESSED OR HOPELESS: NOT AT ALL

## 2024-07-30 NOTE — PROGRESS NOTES
Subjective     HPI   Soni Cleveland is a 74 y.o. year old female patient with presenting to clinic with concern for   Chief Complaint   Patient presents with    Medication Problem     Worried that all of these medications changes are causing problems. Had an episode that when she went to bed she woke up and had to run to the bathroom. Got sick and she became very weak. Changed from cymbalta to lexapro per you.  eliquis to 5 mg to 2.5 mg per hematology.        Eliquis is very expensive at the lower dose. She says she can't afford the 2.5mg bid. Continues the 5mg bid.     Went to bed feeling unwell this weekend 4 days ago. Went to bed feeling chills and tired.  Got up nauseous, Laid on the ground feeling generally weak and nauseous  Frequent Bms after eating. This has been an ongoing issue.     Has been off cymbalta completely for 3 weeks since she started the lexapro. Not likely to be related    Patient Active Problem List   Diagnosis    Abnormal EKG    Abnormally compliant left middle ear system with type AD tympanogram curve    Adrenal nodule (Multi)    Agatston coronary artery calcium score less than 100    Age-related nuclear cataract, bilateral    Allergic reaction    Angina pectoris (CMS-[facility])    Asymmetric SNHL (sensorineural hearing loss)    Primary hypertension    Chronic fatigue    Class 2 severe obesity due to excess calories with serious comorbidity and body mass index (BMI) of 39.0 to 39.9 in adult (Multi)    Depression with anxiety    Dry eyes    Dyslipidemia    Dysphagia    Gait abnormality    Heart palpitations    Mild intermittent asthma without complication ([facility])    Neck pain    Obstructive sleep apnea    Posterior vitreous detachment of both eyes    Presbyacusis    Pulmonary embolism (Multi)    Renal cyst    Restless legs syndrome    Stage 3b chronic kidney disease (Multi)    Stress incontinence in female    Tinnitus of both ears    Tubular adenoma of colon    Vitamin D deficiency    Vitreous  floaters    Decreased hearing    Chronic left-sided low back pain with left-sided sciatica    Mild ascending aorta dilatation (CMS-HCC)    Age-related osteoporosis without current pathological fracture    Gastrointestinal hemorrhage    Bright red rectal bleeding    Shortness of breath    Adrenal mass 1 cm to 4 cm in diameter (Multi)    Vertigo    Posterior vitreous detachment    Osteopenia    Nuclear senile cataract    Migraine    Metabolic syndrome    Memory impairment    History of pulmonary embolism    Gastrointestinal hemorrhage with melena    Elevated cholesterol    Diastolic heart failure (Multi)    Deep vein thrombosis (DVT) (Multi)    Cough    Nonscarring hair loss, unspecified    Alopecia    Adenoma of right adrenal gland    Pulmonary hypertension (Multi)    Polyneuropathy, unspecified    Asymptomatic postmenopausal state    Cervical dysphagia    Cystic kidney disease, unspecified    Deficiency of other specified B group vitamins    Iron deficiency anemia, unspecified    Polyp of colon    Disorder of phosphorus metabolism, unspecified    Hypertensive heart and chronic kidney disease with heart failure and stage 1 through stage 4 chronic kidney disease, or unspecified chronic kidney disease (Multi)    Fatty (change of) liver, not elsewhere classified    H/O high risk medication treatment       Past Medical History:   Diagnosis Date    Acute pulmonary embolism (Multi) 12/15/2023    Acute pulmonary embolism without acute cor pulmonale (Multi) 12/15/2023    Anxiety     Asymmetrical sensorineural hearing loss 03/09/2018    Asymmetrical left sensorineural hearing loss    Chronic deep vein thrombosis (DVT) of lower extremity (Multi) 01/25/2023    Chronic fatigue 03/24/2021    History of chronic fatigue    Chronic kidney disease     Chronic kidney disease, stage 3a (Multi) 02/19/2021    now Stage 3B CKD  Aug 2023    Clotting disorder (Multi) 5/07/2019    Depression     Fall 01/25/2023    Subsequent encounter    GI  bleeding 2023    Gross hematuria 2023    Hypertension     Impacted cerumen, right ear 2018    Impacted cerumen of right ear    Tinnitus, bilateral 2020    Tinnitus, bilateral    Urinary tract infection 2021    Visual impairment       Past Surgical History:   Procedure Laterality Date    APPENDECTOMY  2015    Appendectomy    CARDIAC CATHETERIZATION  2018    HYSTERECTOMY  2015    Hysterectomy    MR HEAD ANGIO WO IV CONTRAST  2012    MR HEAD ANGIO WO IV CONTRAST [facility]    MR NECK ANGIO WO IV CONTRAST  2012    MR NECK ANGIO WO IV CONTRAST [facility]    TONSILLECTOMY  2015    Tonsillectomy      Family History   Problem Relation Name Age of Onset    Cirrhosis Mother      Stroke Father Johan         CVA    Heart failure Father Johan         Congestive    Gout Father Johan     Heart attack Father Johan         Myocardial infarction    Heart disease Father Johan     Colon cancer Sister Miguel (colon cancer)     Cancer Sister Miguel (colon cancer)     COPD Brother Jayesh     Multiple sclerosis Sister Danelle       Social History     Tobacco Use    Smoking status: Former     Current packs/day: 0.00     Average packs/day: 1 pack/day for 20.0 years (20.0 ttl pk-yrs)     Types: Cigarettes     Start date: 1970     Quit date: 1980     Years since quittin.1    Smokeless tobacco: Never    Tobacco comments:     Was a smoker for about 10 yrs   Substance Use Topics    Alcohol use: Not Currently        Current Outpatient Medications:     acetaminophen (Tylenol) 325 mg tablet, Take 2 tablets (650 mg) by mouth every 6 hours if needed., Disp: , Rfl:     apixaban (Eliquis) 2.5 mg tablet, Take 1 tablet (2.5 mg) by mouth 2 times a day., Disp: 180 tablet, Rfl: 3    artificial tears, dextran-hypomel-glycerin, 0.1-0.3-0.2 % ophthalmic solution, Administer into affected eye(s)., Disp: , Rfl:     atorvastatin (Lipitor) 40 mg tablet, Take 1 tablet  (40 mg) by mouth once daily at bedtime., Disp: 90 tablet, Rfl: 3    bisoprolol (Zebeta) 5 mg tablet, Take 0.5 tablets (2.5 mg) by mouth once daily., Disp: 45 tablet, Rfl: 3    calcium carbonate-vitamin D3 (Oyster Shell) 250 mg-3.125 mcg (125 unit) tablet, Take 1 tablet by mouth 2 times a day with meals., Disp: 180 tablet, Rfl: 3    cholecalciferol (Vitamin D-3) 25 MCG (1000 UT) tablet, Take 2 tablets (2,000 Units) by mouth once daily., Disp: , Rfl:     denosumab (Prolia) 60 mg/mL syringe, Prolia, Disp: , Rfl:     escitalopram (Lexapro) 10 mg tablet, Take 1 tablet (10 mg) by mouth once daily., Disp: 90 tablet, Rfl: 1    HAIR, SKIN AND NAILS, BIOTIN, ORAL, Take by mouth once daily., Disp: , Rfl:     ketoconazole (NIZOral) 2 % cream, , Disp: , Rfl:     lisinopril 10 mg tablet, Take 1 tablet (10 mg) by mouth once daily., Disp: 90 tablet, Rfl: 3    minoxidil (Rogaine) 2 % external solution, Apply topically 2 times a day., Disp: , Rfl:     nitroglycerin (Nitrostat) 0.4 mg SL tablet, DISSOLVE 1 TABLET UNDER THE TONGUE EVERY 5 MINUTES FOR UP TO 3 DOSES AS NEEDED FOR CHEST PAIN.CALL 911 IF PAIN PERSISTS., Disp: , Rfl:     sod sulf-pot chloride-mag sulf 1.479-0.188- 0.225 gram tablet, Take 12 tablets by mouth the day before and 12 tablets by mouth the day of colonoscopy per included instructions. Finish prep per instructions., Disp: 24 tablet, Rfl: 0    triamcinolone (Kenalog) 0.1 % cream, Apply topically 2 times a day. Apply to affected area 1-2 times daily as needed. Avoid face and groin., Disp: 30 g, Rfl: 0     Review of Systems  Constitutional: Denies fever  HEENT: Denies ST, earache  CVS: Denies Chest pain  Pulmonary: Denies wheezing, SOB  GI: Denies N/V  : Denies dysuria  Musculoskeletal:  Denies myalgia  Neuro: Denies focal weakness or numbness.  Skin: Denies Rashes.  *Review of Systems is negative unless otherwise mentioned in HPI or ROS above.    Objective   /60   Pulse 60   Temp 36.1 °C (97 °F)   Ht 1.575  "m (5' 2\")   Wt 96 kg (211 lb 9.6 oz)   SpO2 95%   BMI 38.70 kg/m²  reviewed Body mass index is 38.7 kg/m².     Physical Exam  Constitutional: NAD.  Resting comfortably.  Head: Atraumatic, normocephalic.  ENT: Moist oral mucosa. Nasal mucosa wnl.   Cardiac: Regular rate & rhythm.   Pulmonary: Lungs clear bilat  GI: Soft, Nontender, nondistended.   Musculoskeletal: No peripheral edema.   Skin: No evidence of trauma. No rashes  Psych: Intact judgement and insight.    Suspected viral GI infection causing symptoms and dehydration event     .Assessment/Plan   Problem List Items Addressed This Visit    None  Visit Diagnoses         Codes    Diarrhea, unspecified type    -  Primary R19.7            "

## 2024-08-07 ENCOUNTER — OFFICE VISIT (OUTPATIENT)
Dept: CARDIOLOGY | Facility: CLINIC | Age: 74
End: 2024-08-07
Payer: MEDICARE

## 2024-08-07 VITALS
HEART RATE: 60 BPM | HEIGHT: 62 IN | WEIGHT: 210 LBS | SYSTOLIC BLOOD PRESSURE: 120 MMHG | BODY MASS INDEX: 38.64 KG/M2 | DIASTOLIC BLOOD PRESSURE: 74 MMHG | OXYGEN SATURATION: 97 %

## 2024-08-07 DIAGNOSIS — N18.32 STAGE 3B CHRONIC KIDNEY DISEASE (MULTI): ICD-10-CM

## 2024-08-07 DIAGNOSIS — E78.5 DYSLIPIDEMIA: ICD-10-CM

## 2024-08-07 DIAGNOSIS — I26.93 SINGLE SUBSEGMENTAL PULMONARY EMBOLISM WITHOUT ACUTE COR PULMONALE (MULTI): ICD-10-CM

## 2024-08-07 DIAGNOSIS — I10 PRIMARY HYPERTENSION: Primary | ICD-10-CM

## 2024-08-07 DIAGNOSIS — Z86.711 HISTORY OF PULMONARY EMBOLISM: ICD-10-CM

## 2024-08-07 DIAGNOSIS — I25.10 ASCVD (ARTERIOSCLEROTIC CARDIOVASCULAR DISEASE): ICD-10-CM

## 2024-08-07 PROBLEM — I26.99 PULMONARY EMBOLISM (MULTI): Status: RESOLVED | Noted: 2023-01-25 | Resolved: 2024-08-07

## 2024-08-07 PROCEDURE — 1160F RVW MEDS BY RX/DR IN RCRD: CPT | Performed by: NURSE PRACTITIONER

## 2024-08-07 PROCEDURE — 1159F MED LIST DOCD IN RCRD: CPT | Performed by: NURSE PRACTITIONER

## 2024-08-07 PROCEDURE — 3074F SYST BP LT 130 MM HG: CPT | Performed by: NURSE PRACTITIONER

## 2024-08-07 PROCEDURE — 99214 OFFICE O/P EST MOD 30 MIN: CPT | Performed by: NURSE PRACTITIONER

## 2024-08-07 PROCEDURE — 3008F BODY MASS INDEX DOCD: CPT | Performed by: NURSE PRACTITIONER

## 2024-08-07 PROCEDURE — 3078F DIAST BP <80 MM HG: CPT | Performed by: NURSE PRACTITIONER

## 2024-08-07 RX ORDER — NITROGLYCERIN 0.4 MG/1
0.4 TABLET SUBLINGUAL EVERY 5 MIN PRN
Qty: 90 TABLET | Refills: 1 | Status: SHIPPED | OUTPATIENT
Start: 2024-08-07

## 2024-08-07 NOTE — PROGRESS NOTES
Primary Care Physician: Vanessa Dsouza PA-C  Primary Cardiologist:       Date of Visit: 08/07/2024  1:00 PM EDT  Location of visit:  [facility]   Type of Visit: Follow up             Chief Complaint   Patient presents with    Follow-up     Here for 1 year follow up, no complaints          HPI / Summary:   Soni Cleveland is a 74 y.o. female  known to Dr. Diaz with H/o unprovoked PE on long term OAC with Eliquis (7/8/2021,  follows with hematology). Exertional SOB. Abnormal ECG with nonspecific STT changes. Cath 12/14/2018: Minor nonobstructive coronary atherosclerosis. Preserved LV systolic function. HTN. DLP. Prior smoking. FH of CAD. Obesity. CHUCK. Depression with anxiety. Returns for routine follow up     Feeling well, staying active without chest discomfort or unusual dyspnea.  She has been reduced to the prophylaxis dose of Eliquis by hematology       12 system review is negative except as noted above         Medical History:   Past Medical History:   Diagnosis Date    Acute pulmonary embolism (Multi) 12/15/2023    Acute pulmonary embolism without acute cor pulmonale (Multi) 12/15/2023    Anxiety     Asymmetrical sensorineural hearing loss 03/09/2018    Asymmetrical left sensorineural hearing loss    Chronic deep vein thrombosis (DVT) of lower extremity (Multi) 01/25/2023    Chronic fatigue 03/24/2021    History of chronic fatigue    Chronic kidney disease     Chronic kidney disease, stage 3a (Multi) 02/19/2021    now Stage 3B CKD  Aug 2023    Clotting disorder (Multi) 5/07/2019    Depression     Fall 01/25/2023    Subsequent encounter    GI bleeding 01/25/2023    Gross hematuria 01/25/2023    Hypertension     Impacted cerumen, right ear 02/27/2018    Impacted cerumen of right ear    Tinnitus, bilateral 08/18/2020    Tinnitus, bilateral    Urinary tract infection 07/08/2021    Visual impairment        Social History:   Tobacco Use: Medium Risk (7/30/2024)    Patient History     Smoking Tobacco Use: Former      Smokeless Tobacco Use: Never     Passive Exposure: Not on file         MEDICATIONS:   Current Outpatient Medications   Medication Instructions    acetaminophen (TYLENOL) 650 mg, oral, Every 6 hours PRN    apixaban (ELIQUIS) 2.5 mg, oral, 2 times daily    artificial tears, dextran-hypomel-glycerin, 0.1-0.3-0.2 % ophthalmic solution ophthalmic (eye)    atorvastatin (LIPITOR) 40 mg, oral, Nightly    bisoprolol (ZEBETA) 2.5 mg, oral, Daily    calcium carbonate-vitamin D3 (Oyster Shell) 250 mg-3.125 mcg (125 unit) tablet 1 tablet, oral, 2 times daily (morning and late afternoon)    cholecalciferol (VITAMIN D-3) 2,000 Units, oral, Daily RT    denosumab (Prolia) 60 mg/mL syringe Prolia    escitalopram (LEXAPRO) 10 mg, oral, Daily    HAIR, SKIN AND NAILS, BIOTIN, ORAL oral, Daily RT    ketoconazole (NIZOral) 2 % cream     lisinopril 10 mg, oral, Daily    minoxidil (Rogaine) 2 % external solution Topical, 2 times daily    nitroglycerin (Nitrostat) 0.4 mg SL tablet DISSOLVE 1 TABLET UNDER THE TONGUE EVERY 5 MINUTES FOR UP TO 3 DOSES AS NEEDED FOR CHEST PAIN.CALL 911 IF PAIN PERSISTS.    sod sulf-pot chloride-mag sulf 1.479-0.188- 0.225 gram tablet Take 12 tablets by mouth the day before and 12 tablets by mouth the day of colonoscopy per included instructions. Finish prep per instructions.    triamcinolone (Kenalog) 0.1 % cream Topical, 2 times daily, Apply to affected area 1-2 times daily as needed. Avoid face and groin.         IMAGING REVIEWED:      Vascular US lower extremity venous duplex bilateral 7/09/2021  RIGHT:  There is normal compressibility of the common femoral  (including saphenofemoral junction), deep femoral, femoral (including  proximal, mid, and distal aspects), popliteal, posterior tibial and  peroneal veins.  There is a normal, spontaneous and phasic venous  spectral Doppler waveform throughout the right lower extremity.  LEFT:  There is normal compressibility of the common femoral  (including  saphenofemoral junction), deep femoral, femoral (including  proximal, mid, and distal aspects), popliteal, posterior tibial and  peroneal veins.  There is a normal, spontaneous and phasic venous  spectral Doppler waveform throughout the left lower extremity.      Echocardiogram 7/29/2021  CONCLUSIONS:  1. The left ventricular systolic function is normal with a 60-65% estimated ejection fraction.  2. Spectral Doppler shows an impaired relaxation pattern of left ventricular diastolic filling.      Cardiac catheterization 12/14/2018  CONCLUSIONS:   1. Chest pain for evaluation      Angiographically: Rt dominant system      Minor Nonobstructive Coronary atherosclerosis.     Echocardiogram 12/10/2018  CONCLUSIONS:   1. The left ventricular systolic function is normal with a 55-60% estimated ejection fraction.   2. Spectral Doppler shows an impaired relaxation pattern of left ventricular diastolic filling.      LABS:  CBC with Differential:    Lab Results   Component Value Date    WBC 7.7 07/17/2024    RBC 4.51 07/17/2024    HGB 12.7 07/17/2024    HCT 40.6 07/17/2024     07/17/2024    MCV 90 07/17/2024    MCH 28.2 07/17/2024    MCHC 31.3 (L) 07/17/2024    RDW 14.0 07/17/2024    NRBC 0.0 07/17/2024    LYMPHOPCT 28.8 07/17/2024    MONOPCT 11.1 07/17/2024    EOSPCT 2.5 07/17/2024    BASOPCT 0.8 07/17/2024    MONOSABS 0.85 (H) 07/17/2024    LYMPHSABS 2.20 07/17/2024    EOSABS 0.19 07/17/2024    BASOSABS 0.06 07/17/2024     CMP:    Lab Results   Component Value Date     07/17/2024    K 4.5 07/17/2024     07/17/2024    CO2 27 07/17/2024    BUN 21 07/17/2024    CREATININE 1.31 (H) 07/17/2024    GLUCOSE 97 07/17/2024    PROT 7.0 07/17/2024    CALCIUM 9.4 07/17/2024    BILITOT 0.3 07/17/2024    ALKPHOS 84 07/17/2024    AST 19 07/17/2024    ALT 22 07/17/2024     BMP:    Lab Results   Component Value Date     07/17/2024    K 4.5 07/17/2024     07/17/2024    CO2 27 07/17/2024    BUN 21 07/17/2024     "CREATININE 1.31 (H) 07/17/2024    CALCIUM 9.4 07/17/2024    GLUCOSE 97 07/17/2024     Magnesium:  Lab Results   Component Value Date    MG 2.09 07/17/2024     Troponin:  No results found for: \"TROPHS\"  BNP:   Lab Results   Component Value Date    BNP 27 07/08/2021         Lipid Panel:  Lab Results   Component Value Date    HDL 60.9 06/01/2024    CHHDL 3.0 06/01/2024    VLDL 35 06/01/2024    TRIG 175 (H) 06/01/2024    NHDL 123 06/01/2024        Lab work and imaging results independently reviewed by me     Visit Vitals  /74   Pulse 60   Ht 1.575 m (5' 2\")   Wt 95.3 kg (210 lb)   SpO2 97%   BMI 38.41 kg/m²   OB Status Postmenopausal   Smoking Status Former   BSA 2.04 m²           Constitutional:       Appearance: Healthy appearance. Not in distress.   Eyes:      Conjunctiva/sclera: Conjunctivae normal.   Neck:      Vascular: JVD normal.   Pulmonary:      Effort: Pulmonary effort is normal.      Breath sounds: Normal breath sounds.   Cardiovascular:      PMI at left midclavicular line. Normal rate. Regular rhythm. Normal S1. Normal S2.       Murmurs: There is no murmur.      No rub.   Pulses:     Intact distal pulses.   Edema:     Peripheral edema absent.   Abdominal:      General: Bowel sounds are normal.   Musculoskeletal:      Cervical back: Neck supple. Skin:     General: Skin is warm and dry.   Neurological:      Mental Status: Alert and oriented to person, place and time.           Problem List Items Addressed This Visit             ICD-10-CM    Primary hypertension - Primary I10    Dyslipidemia E78.5    RESOLVED: Pulmonary embolism (Multi) I26.99    Stage 3b chronic kidney disease (Multi) N18.32    History of pulmonary embolism Z86.711    Relevant Medications    apixaban (Eliquis) 5 mg tablet    Other Relevant Orders    Referral to Clinical Pharmacy     Other Visit Diagnoses         Codes    ASCVD (arteriosclerotic cardiovascular disease)     I25.10    Relevant Medications    nitroglycerin (Nitrostat) 0.4 " mg SL tablet              Mrs. Cleveland is doing well from a CV perspective without angina or symptoms suggestive of decompensated HF     BP is in acceptable range on current RX which She is tolerating well and agrees to continue   -- bisoprolol 2.5 mg   --lisinopril 10 mg     Most recent LDL 88 mg/dL   ---Atorvastatin 40 mg HS   Mediterranean / DASH diet   150 minutes of symptom limited exercise / week       Referral to clinical pharmacy re: cost assistance for Eliquis   Currently, the 5 mg tab 0.5 BID is more affordable       F/U 1 year     08/07/24 at 1:18 PM - YAMILET Silva-CNP      Orders:  No orders of the defined types were placed in this encounter.        Followup Appts:  Future Appointments   Date Time Provider Department Center   9/26/2024 11:00 AM GEN MAMMO 1 GENMAM [facility]   9/30/2024  2:00 PM GEN PAT GENPAT [facility]   10/8/2024 11:00 AM Vanessa Dsouza PA-C [id number] [facility]   10/21/2024  8:00 AM Mathieu Cornelius MD [address]   12/17/2024 11:00 AM Sonya Crawley MD [id number] [facility]   1/6/2025 11:00 AM Sarah J Lombardo, AUD, CCC-A [id number] [place]   1/6/2025 11:30 AM Sally Lloyd MD [id number] [place]   1/14/2025 11:00 AM INF 03 GENEVA GENSCCINF [facility]   7/17/2025 11:30 AM Miles Lubin MD [id number] [facility]

## 2024-08-24 DIAGNOSIS — I10 PRIMARY HYPERTENSION: ICD-10-CM

## 2024-08-26 RX ORDER — BISOPROLOL FUMARATE 5 MG/1
2.5 TABLET, FILM COATED ORAL DAILY
Qty: 45 TABLET | Refills: 2 | Status: SHIPPED | OUTPATIENT
Start: 2024-08-26

## 2024-09-19 ENCOUNTER — APPOINTMENT (OUTPATIENT)
Dept: RADIOLOGY | Facility: HOSPITAL | Age: 74
End: 2024-09-19
Payer: MEDICARE

## 2024-09-19 ENCOUNTER — HOSPITAL ENCOUNTER (EMERGENCY)
Facility: HOSPITAL | Age: 74
Discharge: HOME | End: 2024-09-19
Attending: EMERGENCY MEDICINE
Payer: MEDICARE

## 2024-09-19 ENCOUNTER — APPOINTMENT (OUTPATIENT)
Dept: CARDIOLOGY | Facility: HOSPITAL | Age: 74
End: 2024-09-19
Payer: MEDICARE

## 2024-09-19 VITALS
RESPIRATION RATE: 16 BRPM | HEIGHT: 62 IN | HEART RATE: 97 BPM | TEMPERATURE: 97.2 F | SYSTOLIC BLOOD PRESSURE: 123 MMHG | DIASTOLIC BLOOD PRESSURE: 68 MMHG | WEIGHT: 210 LBS | OXYGEN SATURATION: 98 % | BODY MASS INDEX: 38.64 KG/M2
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
## 2024-09-19 DIAGNOSIS — S01.21XA LACERATION OF NOSE, INITIAL ENCOUNTER: ICD-10-CM

## 2024-09-19 DIAGNOSIS — S20.212A RIB CONTUSION, LEFT, INITIAL ENCOUNTER: ICD-10-CM

## 2024-09-19 DIAGNOSIS — S02.2XXA CLOSED FRACTURE OF NASAL BONE, INITIAL ENCOUNTER: ICD-10-CM

## 2024-09-19 LAB
ALBUMIN SERPL BCP-MCNC: 4.3 G/DL (ref 3.4–5)
ALP SERPL-CCNC: 106 U/L (ref 33–136)
ALT SERPL W P-5'-P-CCNC: 25 U/L (ref 7–45)
ANION GAP SERPL CALC-SCNC: 12 MMOL/L (ref 10–20)
AST SERPL W P-5'-P-CCNC: 24 U/L (ref 9–39)
BASOPHILS # BLD AUTO: 0.04 X10*3/UL (ref 0–0.1)
BASOPHILS NFR BLD AUTO: 0.6 %
BILIRUB SERPL-MCNC: 0.3 MG/DL (ref 0–1.2)
BUN SERPL-MCNC: 27 MG/DL (ref 6–23)
CALCIUM SERPL-MCNC: 9.7 MG/DL (ref 8.6–10.3)
CARDIAC TROPONIN I PNL SERPL HS: <3 NG/L (ref 0–13)
CHLORIDE SERPL-SCNC: 105 MMOL/L (ref 98–107)
CO2 SERPL-SCNC: 26 MMOL/L (ref 21–32)
CREAT SERPL-MCNC: 1.41 MG/DL (ref 0.5–1.05)
EGFRCR SERPLBLD CKD-EPI 2021: 40 ML/MIN/1.73M*2
EOSINOPHIL # BLD AUTO: 0.32 X10*3/UL (ref 0–0.4)
EOSINOPHIL NFR BLD AUTO: 4.8 %
ERYTHROCYTE [DISTWIDTH] IN BLOOD BY AUTOMATED COUNT: 13.9 % (ref 11.5–14.5)
GLUCOSE SERPL-MCNC: 128 MG/DL (ref 74–99)
HCT VFR BLD AUTO: 42.1 % (ref 36–46)
HGB BLD-MCNC: 13.1 G/DL (ref 12–16)
IMM GRANULOCYTES # BLD AUTO: 0.02 X10*3/UL (ref 0–0.5)
IMM GRANULOCYTES NFR BLD AUTO: 0.3 % (ref 0–0.9)
LIPASE SERPL-CCNC: 42 U/L (ref 9–82)
LYMPHOCYTES # BLD AUTO: 1.81 X10*3/UL (ref 0.8–3)
LYMPHOCYTES NFR BLD AUTO: 27.4 %
MCH RBC QN AUTO: 28.5 PG (ref 26–34)
MCHC RBC AUTO-ENTMCNC: 31.1 G/DL (ref 32–36)
MCV RBC AUTO: 92 FL (ref 80–100)
MONOCYTES # BLD AUTO: 0.66 X10*3/UL (ref 0.05–0.8)
MONOCYTES NFR BLD AUTO: 10 %
NEUTROPHILS # BLD AUTO: 3.76 X10*3/UL (ref 1.6–5.5)
NEUTROPHILS NFR BLD AUTO: 56.9 %
NRBC BLD-RTO: 0 /100 WBCS (ref 0–0)
PLATELET # BLD AUTO: 222 X10*3/UL (ref 150–450)
POTASSIUM SERPL-SCNC: 4.1 MMOL/L (ref 3.5–5.3)
PROT SERPL-MCNC: 7.6 G/DL (ref 6.4–8.2)
RBC # BLD AUTO: 4.59 X10*6/UL (ref 4–5.2)
SODIUM SERPL-SCNC: 139 MMOL/L (ref 136–145)
WBC # BLD AUTO: 6.6 X10*3/UL (ref 4.4–11.3)

## 2024-09-19 PROCEDURE — 72125 CT NECK SPINE W/O DYE: CPT

## 2024-09-19 PROCEDURE — 93005 ELECTROCARDIOGRAM TRACING: CPT

## 2024-09-19 PROCEDURE — 85025 COMPLETE CBC W/AUTO DIFF WBC: CPT | Performed by: EMERGENCY MEDICINE

## 2024-09-19 PROCEDURE — 70450 CT HEAD/BRAIN W/O DYE: CPT

## 2024-09-19 PROCEDURE — 76377 3D RENDER W/INTRP POSTPROCES: CPT

## 2024-09-19 PROCEDURE — 99285 EMERGENCY DEPT VISIT HI MDM: CPT | Mod: 25

## 2024-09-19 PROCEDURE — 83690 ASSAY OF LIPASE: CPT | Performed by: EMERGENCY MEDICINE

## 2024-09-19 PROCEDURE — 2550000001 HC RX 255 CONTRASTS: Performed by: EMERGENCY MEDICINE

## 2024-09-19 PROCEDURE — 80053 COMPREHEN METABOLIC PANEL: CPT | Performed by: EMERGENCY MEDICINE

## 2024-09-19 PROCEDURE — 36415 COLL VENOUS BLD VENIPUNCTURE: CPT | Performed by: EMERGENCY MEDICINE

## 2024-09-19 PROCEDURE — 74177 CT ABD & PELVIS W/CONTRAST: CPT | Mod: FOREIGN READ | Performed by: RADIOLOGY

## 2024-09-19 PROCEDURE — 71260 CT THORAX DX C+: CPT

## 2024-09-19 PROCEDURE — 70486 CT MAXILLOFACIAL W/O DYE: CPT

## 2024-09-19 PROCEDURE — 84484 ASSAY OF TROPONIN QUANT: CPT | Performed by: EMERGENCY MEDICINE

## 2024-09-19 PROCEDURE — 71260 CT THORAX DX C+: CPT | Mod: FOREIGN READ | Performed by: RADIOLOGY

## 2024-09-19 RX ADMIN — IOHEXOL 75 ML: 350 INJECTION, SOLUTION INTRAVENOUS at 16:44

## 2024-09-19 ASSESSMENT — COLUMBIA-SUICIDE SEVERITY RATING SCALE - C-SSRS
1. IN THE PAST MONTH, HAVE YOU WISHED YOU WERE DEAD OR WISHED YOU COULD GO TO SLEEP AND NOT WAKE UP?: NO
2. HAVE YOU ACTUALLY HAD ANY THOUGHTS OF KILLING YOURSELF?: NO
6. HAVE YOU EVER DONE ANYTHING, STARTED TO DO ANYTHING, OR PREPARED TO DO ANYTHING TO END YOUR LIFE?: NO

## 2024-09-19 ASSESSMENT — PAIN DESCRIPTION - LOCATION: LOCATION: FACE

## 2024-09-19 ASSESSMENT — PAIN SCALES - GENERAL: PAINLEVEL_OUTOF10: 5 - MODERATE PAIN

## 2024-09-19 ASSESSMENT — PAIN - FUNCTIONAL ASSESSMENT: PAIN_FUNCTIONAL_ASSESSMENT: 0-10

## 2024-09-19 ASSESSMENT — PAIN DESCRIPTION - DESCRIPTORS: DESCRIPTORS: ACHING

## 2024-09-19 ASSESSMENT — PAIN DESCRIPTION - PAIN TYPE: TYPE: ACUTE PAIN

## 2024-09-19 NOTE — ED TRIAGE NOTES
Patient slipped on rug and fell to floor striking her face , patient is on blood thinners , patient denies loc

## 2024-09-19 NOTE — ED PROVIDER NOTES
HPI   Chief Complaint   Patient presents with    Fall     Fall struck face on floor        HPI  Patient is a 74-year-old female anticoagulated on Eliquis, presenting to the ED today for a fall.  Patient explains that she was getting up from her chair when she tripped on her rug.  She explains that she fell face forward, hitting her face and chest on the floor.  She was unable to catch herself with her arms.  She currently complains of pain to her face.  She has a cut over her nasal bridge where her glasses were.  She also complains of pain to her left chest.  She otherwise denies any shortness of breath.  She denies any abdominal pain, nausea, vomiting.  She denies any loss of consciousness.  She has no additional concerns.      Patient History   Past Medical History:   Diagnosis Date    Acute pulmonary embolism (Multi) 12/15/2023    Acute pulmonary embolism without acute cor pulmonale (Multi) 12/15/2023    Anxiety     Asymmetrical sensorineural hearing loss 03/09/2018    Asymmetrical left sensorineural hearing loss    Chronic deep vein thrombosis (DVT) of lower extremity (Multi) 01/25/2023    Chronic fatigue 03/24/2021    History of chronic fatigue    Chronic kidney disease     Chronic kidney disease, stage 3a (Multi) 02/19/2021    now Stage 3B CKD  Aug 2023    Clotting disorder (Multi) 5/07/2019    Depression     Fall 01/25/2023    Subsequent encounter    GI bleeding 01/25/2023    Gross hematuria 01/25/2023    Hypertension     Impacted cerumen, right ear 02/27/2018    Impacted cerumen of right ear    Tinnitus, bilateral 08/18/2020    Tinnitus, bilateral    Urinary tract infection 07/08/2021    Visual impairment      Past Surgical History:   Procedure Laterality Date    APPENDECTOMY  03/19/2015    Appendectomy    CARDIAC CATHETERIZATION  December 2018    HYSTERECTOMY  03/19/2015    Hysterectomy    MR HEAD ANGIO WO IV CONTRAST  02/21/2012    MR HEAD ANGIO WO IV CONTRAST [facility]    MR NECK ANGIO WO IV  CONTRAST  2012    MR NECK ANGIO WO IV CONTRAST [facility]    TONSILLECTOMY  2015    Tonsillectomy     Family History   Problem Relation Name Age of Onset    Cirrhosis Mother      Stroke Father Johan         CVA    Heart failure Father Johan         Congestive    Gout Father Johan     Heart attack Father Johan         Myocardial infarction    Heart disease Father Johan     Colon cancer Sister Miguel (colon cancer)     Cancer Sister Miguel (colon cancer)     COPD Brother Jayesh     Multiple sclerosis Sister Danelle      Social History     Tobacco Use    Smoking status: Former     Current packs/day: 0.00     Average packs/day: 1 pack/day for 20.0 years (20.0 ttl pk-yrs)     Types: Cigarettes     Start date: 1970     Quit date: 1980     Years since quittin.3    Smokeless tobacco: Never    Tobacco comments:     Was a smoker for about 10 yrs   Vaping Use    Vaping status: Never Used   Substance Use Topics    Alcohol use: Not Currently    Drug use: Never       Physical Exam   ED Triage Vitals [24 1619]   Temperature Heart Rate Respirations BP   36.2 °C (97.1 °F) 67 16 (!) 149/104      Pulse Ox Temp Source Heart Rate Source Patient Position   97 % Tympanic -- Sitting      BP Location FiO2 (%)     Right arm --       Physical Exam  Vitals and nursing note reviewed.   Constitutional:       General: She is not in acute distress.     Appearance: She is not toxic-appearing.   HENT:      Nose:      Comments: Swelling over the nasal bridge with a superficial 0.5 cm laceration over the nasal bridge. Dried blood present in bilateral nares, no septal hematoma     Mouth/Throat:      Mouth: Mucous membranes are moist.   Eyes:      Extraocular Movements: Extraocular movements intact.      Conjunctiva/sclera: Conjunctivae normal.      Pupils: Pupils are equal, round, and reactive to light.   Neck:      Comments: No midline cervical spine tenderness to palpation  Cardiovascular:      Rate and Rhythm:  Normal rate and regular rhythm.      Pulses: Normal pulses.      Comments: Tenderness to palpation over the left lateral chest wall, no overlying ecchymosis  Pulmonary:      Effort: Pulmonary effort is normal. No respiratory distress.      Breath sounds: Normal breath sounds. No wheezing.   Abdominal:      General: There is no distension.      Palpations: Abdomen is soft.      Tenderness: There is no abdominal tenderness.   Musculoskeletal:         General: No swelling.      Cervical back: Neck supple.   Skin:     General: Skin is warm and dry.      Capillary Refill: Capillary refill takes less than 2 seconds.   Neurological:      General: No focal deficit present.      Mental Status: She is alert. Mental status is at baseline.      Comments: This patient is awake, alert and oriented to person, place and time. Speech is clear and fluent. Cranial nerves II-XII are grossly intact. Strength and sensation are intact in all extremities. Stable gait.             ED Course & MDM   ED Course as of 09/19/24 1736   u Sep 19, 2024   1710 EKG obtained at 1646, interpreted by myself.  Normal sinus rhythm with a ventricular rate of 67, no axis deviation, normal intervals, with no acute ischemic changes [VT]      ED Course User Index  [VT] Radha LARSEN MD         Diagnoses as of 09/19/24 1736   Fall, initial encounter   Closed fracture of nasal bone, initial encounter   Laceration of nose, initial encounter   Rib contusion, left, initial encounter                 No data recorded                             Medical Decision Making  Patient was seen and evaluated for a mechanical fall at home.  Given history of anticoagulation with obvious facial trauma, trauma alert was initiated in triage.  Additional lab work and imaging ordered for further evaluation of acute traumatic injuries.  On exam, patient has no focal neurological deficits.  ABCs are intact.  Vital signs are unremarkable on arrival.    CBC is unremarkable.  CMP shows  slightly elevated creatinine of 1.4, otherwise unremarkable.  Lipase is normal at 42.  High-sensitivity troponin is negative.  CT chest abdomen pelvis w IV contrast   Final Result   1. No CT evidence for traumatic injury.   2. Mild coronary artery calcifications.   3. Mild hepatic steatosis.   4. Colonic diverticulosis without findings of diverticulitis.   Signed by Arjun Tristan MD      CT head W O contrast trauma protocol   Final Result   No evidence of acute cortical infarct or intracranial hemorrhage.        No evidence of intracranial hemorrhage or displaced skull fracture.        MACRO:   None        Signed by: Jennifer Theodore 9/19/2024 4:39 PM   Dictation workstation:   [id number]      CT cervical spine wo IV contrast   Final Result   No acute fracture        Mild anterolisthesis C4 on C5 appears degenerative        Severe degenerative disc change C5-6        MACRO:   None        Signed by: Jennifer Theodore 9/19/2024 4:46 PM   Dictation workstation:   [id number]      CT maxillofacial bones wo IV contrast   Final Result   Comminuted fracture of the nasal bones with right-sided fracture   fragments depressed maximum of 3 mm. About 1 mm leftward shift of the   left nasal bone fracture fragment.        MACRO:   None        Signed by: Jennifer Theodore 9/19/2024 4:41 PM   Dictation workstation:   [id number]      CT 3D reconstruction   Final Result   Comminuted fracture of the nasal bones with right-sided fracture   fragments depressed maximum of 3 mm. About 1 mm leftward shift of the   left nasal bone fracture fragment.        MACRO:   None        Signed by: Jennifer Theodore 9/19/2024 4:41 PM   Dictation workstation:   [id number]        On reevaluation, patient is resting comfortably in bed.  Patient's nasal laceration is repaired with Steri-Strips by myself as described below. Patient was informed of their lab and imaging results, and all questions and concerns were answered. Discharge planning with close  outpatient follow-up was discussed at this time, to which the patient was agreeable.  Patient is provided with a referral to Dr. Lloyd, ENT, for follow-up of her nasal bone fracture.  Strict return precautions were given, and patient was discharged home in stable condition.      Procedure  Laceration Repair    Performed by: Radha LARSEN MD  Authorized by: Radha LARSEN MD    Consent:     Consent obtained:  Verbal    Consent given by:  Patient    Risks, benefits, and alternatives were discussed: yes      Risks discussed:  Need for additional repair and infection    Alternatives discussed:  No treatment  Universal protocol:     Procedure explained and questions answered to patient or proxy's satisfaction: yes      Imaging studies available: yes      Patient identity confirmed:  Verbally with patient and arm band  Anesthesia:     Anesthesia method:  None  Laceration details:     Location:  Face    Face location:  Nose    Length (cm):  0.5  Exploration:     Limited defect created (wound extended): no      Hemostasis achieved with:  Direct pressure    Imaging outcome: foreign body not noted      Wound exploration: wound explored through full range of motion and entire depth of wound visualized      Contaminated: no    Treatment:     Area cleansed with:  Soap and water    Amount of cleaning:  Standard    Visualized foreign bodies/material removed: no      Debridement:  None  Skin repair:     Repair method:  Steri-Strips    Number of Steri-Strips:  3  Approximation:     Approximation:  Close  Repair type:     Repair type:  Simple  Post-procedure details:     Dressing:  Open (no dressing)    Procedure completion:  Tolerated well, no immediate complications       Radha LARSEN MD  09/19/24 2815

## 2024-09-20 ENCOUNTER — TELEPHONE (OUTPATIENT)
Dept: PRIMARY CARE | Facility: CLINIC | Age: 74
End: 2024-09-20
Payer: MEDICARE

## 2024-09-20 NOTE — TELEPHONE ENCOUNTER
FYI  Patient fell 9/19/24-  broke nose-  Went to ED-  they gave her a referral to ENT- Dr. Klein  Has appt with you 10/8/24  She will call if she needs anything sooner.

## 2024-09-23 LAB
ATRIAL RATE: 67 BPM
P AXIS: 56 DEGREES
P OFFSET: 203 MS
P ONSET: 140 MS
PR INTERVAL: 174 MS
Q ONSET: 227 MS
QRS COUNT: 11 BEATS
QRS DURATION: 80 MS
QT INTERVAL: 416 MS
QTC CALCULATION(BAZETT): 439 MS
QTC FREDERICIA: 431 MS
R AXIS: 28 DEGREES
T AXIS: 32 DEGREES
T OFFSET: 435 MS
VENTRICULAR RATE: 67 BPM

## 2024-09-25 ENCOUNTER — HOSPITAL ENCOUNTER (EMERGENCY)
Facility: HOSPITAL | Age: 74
Discharge: HOME | End: 2024-09-25
Attending: FAMILY MEDICINE
Payer: MEDICARE

## 2024-09-25 ENCOUNTER — APPOINTMENT (OUTPATIENT)
Dept: RADIOLOGY | Facility: HOSPITAL | Age: 74
End: 2024-09-25
Payer: MEDICARE

## 2024-09-25 VITALS
HEART RATE: 67 BPM | OXYGEN SATURATION: 98 % | BODY MASS INDEX: 38.64 KG/M2 | HEIGHT: 62 IN | TEMPERATURE: 98.2 F | WEIGHT: 210 LBS | DIASTOLIC BLOOD PRESSURE: 65 MMHG | SYSTOLIC BLOOD PRESSURE: 116 MMHG | RESPIRATION RATE: 18 BRPM
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
## 2024-09-25 DIAGNOSIS — R07.81 RIB PAIN: ICD-10-CM

## 2024-09-25 PROCEDURE — 71101 X-RAY EXAM UNILAT RIBS/CHEST: CPT | Mod: RT

## 2024-09-25 PROCEDURE — 2500000005 HC RX 250 GENERAL PHARMACY W/O HCPCS: Performed by: FAMILY MEDICINE

## 2024-09-25 PROCEDURE — 71101 X-RAY EXAM UNILAT RIBS/CHEST: CPT | Mod: RIGHT SIDE | Performed by: SURGERY

## 2024-09-25 PROCEDURE — 99283 EMERGENCY DEPT VISIT LOW MDM: CPT

## 2024-09-25 PROCEDURE — 96372 THER/PROPH/DIAG INJ SC/IM: CPT | Performed by: FAMILY MEDICINE

## 2024-09-25 PROCEDURE — 2500000004 HC RX 250 GENERAL PHARMACY W/ HCPCS (ALT 636 FOR OP/ED): Performed by: FAMILY MEDICINE

## 2024-09-25 RX ORDER — OXYCODONE AND ACETAMINOPHEN 5; 325 MG/1; MG/1
1 TABLET ORAL ONCE
Status: DISCONTINUED | OUTPATIENT
Start: 2024-09-25 | End: 2024-09-25 | Stop reason: HOSPADM

## 2024-09-25 RX ORDER — ORPHENADRINE CITRATE 30 MG/ML
60 INJECTION INTRAMUSCULAR; INTRAVENOUS ONCE
Status: COMPLETED | OUTPATIENT
Start: 2024-09-25 | End: 2024-09-25

## 2024-09-25 RX ORDER — LIDOCAINE 560 MG/1
1 PATCH PERCUTANEOUS; TOPICAL; TRANSDERMAL ONCE
Status: DISCONTINUED | OUTPATIENT
Start: 2024-09-25 | End: 2024-09-25 | Stop reason: HOSPADM

## 2024-09-25 RX ORDER — DICLOFENAC SODIUM 10 MG/G
4 GEL TOPICAL 4 TIMES DAILY PRN
Qty: 100 G | Refills: 0 | Status: SHIPPED | OUTPATIENT
Start: 2024-09-25

## 2024-09-25 RX ORDER — LIDOCAINE 50 MG/G
1 PATCH TOPICAL DAILY
Qty: 12 PATCH | Refills: 0 | Status: SHIPPED | OUTPATIENT
Start: 2024-09-25

## 2024-09-25 ASSESSMENT — PAIN - FUNCTIONAL ASSESSMENT: PAIN_FUNCTIONAL_ASSESSMENT: 0-10

## 2024-09-25 ASSESSMENT — COLUMBIA-SUICIDE SEVERITY RATING SCALE - C-SSRS
2. HAVE YOU ACTUALLY HAD ANY THOUGHTS OF KILLING YOURSELF?: NO
1. IN THE PAST MONTH, HAVE YOU WISHED YOU WERE DEAD OR WISHED YOU COULD GO TO SLEEP AND NOT WAKE UP?: NO
6. HAVE YOU EVER DONE ANYTHING, STARTED TO DO ANYTHING, OR PREPARED TO DO ANYTHING TO END YOUR LIFE?: NO

## 2024-09-25 ASSESSMENT — PAIN SCALES - GENERAL
PAINLEVEL_OUTOF10: 2
PAINLEVEL_OUTOF10: 5 - MODERATE PAIN

## 2024-09-25 ASSESSMENT — PAIN DESCRIPTION - DIRECTION: RADIATING_TOWARDS: RIB PAIN

## 2024-09-25 ASSESSMENT — PAIN DESCRIPTION - ORIENTATION: ORIENTATION: RIGHT

## 2024-09-25 NOTE — ED PROVIDER NOTES
HPI   Chief Complaint   Patient presents with    Rib Injury     Right sided rib pain. Here  in Ed, last Thursday, 9/19/24 for slipping on rug in kitchen and broke her nose.       74-year-old female comes to the ED with complaint of right sided rib pain status post fall that occurred last Thursday.  Patient reports that the pain is continued to persist become more aggravated with movement and deep breathing.  Patient reports that she was on Eliquis and has been on it for a PE/DVT and scanned her and worked her up after the fall and everything came back negative.  Patient reports she has been try to take over-the-counter medications but it continues to bother her and she came in today for reassessment and treatment.  Patient in the ED is alert, cooperative, presents, uncomfortable, but in no distress.  Patient reports no other associate symptoms or complaints at this time.      History provided by:  Patient and medical records   used: No            Patient History   Past Medical History:   Diagnosis Date    Acute pulmonary embolism (Multi) 12/15/2023    Acute pulmonary embolism without acute cor pulmonale (Multi) 12/15/2023    Anxiety     Asymmetrical sensorineural hearing loss 03/09/2018    Asymmetrical left sensorineural hearing loss    Chronic deep vein thrombosis (DVT) of lower extremity (Multi) 01/25/2023    Chronic fatigue 03/24/2021    History of chronic fatigue    Chronic kidney disease     Chronic kidney disease, stage 3a (Multi) 02/19/2021    now Stage 3B CKD  Aug 2023    Clotting disorder (Multi) 5/07/2019    Depression     Fall 01/25/2023    Subsequent encounter    GI bleeding 01/25/2023    Gross hematuria 01/25/2023    Hypertension     Impacted cerumen, right ear 02/27/2018    Impacted cerumen of right ear    Tinnitus, bilateral 08/18/2020    Tinnitus, bilateral    Urinary tract infection 07/08/2021    Visual impairment      Past Surgical History:   Procedure Laterality Date     APPENDECTOMY  2015    Appendectomy    CARDIAC CATHETERIZATION  2018    HYSTERECTOMY  2015    Hysterectomy    MR HEAD ANGIO WO IV CONTRAST  2012    MR HEAD ANGIO WO IV CONTRAST [facility]    MR NECK ANGIO WO IV CONTRAST  2012    MR NECK ANGIO WO IV CONTRAST [facility]    TONSILLECTOMY  2015    Tonsillectomy     Family History   Problem Relation Name Age of Onset    Cirrhosis Mother      Stroke Father Johan         CVA    Heart failure Father Johan         Congestive    Gout Father Johan     Heart attack Father Johan         Myocardial infarction    Heart disease Father Johan     Colon cancer Sister Miguel (colon cancer)     Cancer Sister Miguel (colon cancer)     COPD Brother Jayesh     Multiple sclerosis Sister Danelle      Social History     Tobacco Use    Smoking status: Former     Current packs/day: 0.00     Average packs/day: 1 pack/day for 20.0 years (20.0 ttl pk-yrs)     Types: Cigarettes     Start date: 1970     Quit date: 1980     Years since quittin.3    Smokeless tobacco: Never    Tobacco comments:     Was a smoker for about 10 yrs   Vaping Use    Vaping status: Never Used   Substance Use Topics    Alcohol use: Not Currently    Drug use: Never       Physical Exam   ED Triage Vitals [24 0213]   Temperature Heart Rate Respirations BP   36.8 °C (98.2 °F) 65 18 141/81      Pulse Ox Temp Source Heart Rate Source Patient Position   96 % Oral -- Sitting      BP Location FiO2 (%)     Right arm --       Physical Exam  Vitals and nursing note reviewed. Exam conducted with a chaperone present.   Constitutional:       General: She is not in acute distress.     Appearance: She is well-developed.   HENT:      Head: Normocephalic and atraumatic.   Eyes:      Conjunctiva/sclera: Conjunctivae normal.   Cardiovascular:      Rate and Rhythm: Normal rate and regular rhythm.      Pulses: Normal pulses.      Heart sounds: Normal heart sounds, S1 normal  and S2 normal. No murmur heard.  Pulmonary:      Effort: Pulmonary effort is normal. No tachypnea or respiratory distress.      Breath sounds: Normal breath sounds and air entry.   Chest:      Chest wall: Tenderness present. No mass, lacerations, deformity, swelling, crepitus or edema. There is no dullness to percussion.          Comments: Noted some old healing bruising across the right breast as well as tenderness to palpation along the right sided lateral ribs  Pain aggravated with range of motion  No signs of infection was noted  Abdominal:      Palpations: Abdomen is soft.      Tenderness: There is no abdominal tenderness.   Musculoskeletal:         General: No swelling.      Cervical back: Neck supple.   Skin:     General: Skin is warm and dry.      Capillary Refill: Capillary refill takes less than 2 seconds.   Neurological:      General: No focal deficit present.      Mental Status: She is alert and oriented to person, place, and time.      GCS: GCS eye subscore is 4. GCS verbal subscore is 5. GCS motor subscore is 6.   Psychiatric:         Mood and Affect: Mood normal.           ED Course & MDM   Diagnoses as of 09/25/24 0411   Fall, initial encounter   Rib pain                 No data recorded     [place] Coma Scale Score: 15 (09/25/24 0219 : Amanda Soto, GERARDO)                       XR ribs right 2 views w chest pa or ap   Final Result   1.  No evidence of acute cardiopulmonary process. Bibasilar   atelectasis. No acute right rib fracture.                  MACRO:   None        Signed by: Harpreet Calvert 9/25/2024 3:11 AM   Dictation workstation:   [id number]            Medical Decision Making  Pt upon arrival to the ED appeared to be anxious and uncomfortable but in no distress with stable vitals.  Discussed with pt the presenting complaints and clinically findings.  Reviewed with pt the epic chart and counseled the patient on injury status post falls and appropriate approach to management/treatments.  After  assessment and evaluation lidocaine patch was applied, given oral/IM pain medication, imaging ordered, and patient observed.  After treatment and a period of rest patient was reassessed and finally feeling better, pain better controlled, no new physical complaints, vital signs stable, and final results were reviewed discussed with patient.  At this time discussion was had with patient regarding his fall, medications as well as prescription meds for management of her pain and encouraged her to contact her primary care doctor for follow-up recheck in several days.  Patient stable at this time and discharged home.      Amount and/or Complexity of Data Reviewed  External Data Reviewed: labs, radiology, ECG and notes.  Radiology: ordered. Decision-making details documented in ED Course.    Risk  OTC drugs.  Prescription drug management.        Procedure  Procedures     Sha Oakley MD  09/25/24 0428

## 2024-09-26 ENCOUNTER — APPOINTMENT (OUTPATIENT)
Dept: RADIOLOGY | Facility: HOSPITAL | Age: 74
End: 2024-09-26
Payer: MEDICARE

## 2024-09-30 ENCOUNTER — PRE-ADMISSION TESTING (OUTPATIENT)
Dept: PREADMISSION TESTING | Facility: HOSPITAL | Age: 74
End: 2024-09-30
Payer: MEDICARE

## 2024-10-01 ENCOUNTER — APPOINTMENT (OUTPATIENT)
Dept: CARDIOLOGY | Facility: HOSPITAL | Age: 74
End: 2024-10-01
Payer: MEDICARE

## 2024-10-01 ENCOUNTER — APPOINTMENT (OUTPATIENT)
Dept: RADIOLOGY | Facility: HOSPITAL | Age: 74
End: 2024-10-01
Payer: MEDICARE

## 2024-10-01 ENCOUNTER — HOSPITAL ENCOUNTER (EMERGENCY)
Facility: HOSPITAL | Age: 74
Discharge: HOME | End: 2024-10-01
Attending: EMERGENCY MEDICINE
Payer: MEDICARE

## 2024-10-01 VITALS
HEART RATE: 69 BPM | RESPIRATION RATE: 16 BRPM | OXYGEN SATURATION: 97 % | BODY MASS INDEX: 38.64 KG/M2 | HEIGHT: 62 IN | TEMPERATURE: 97.1 F | WEIGHT: 210 LBS | SYSTOLIC BLOOD PRESSURE: 112 MMHG | DIASTOLIC BLOOD PRESSURE: 58 MMHG

## 2024-10-01 DIAGNOSIS — S22.31XA CLOSED FRACTURE OF ONE RIB OF RIGHT SIDE, INITIAL ENCOUNTER: ICD-10-CM

## 2024-10-01 DIAGNOSIS — R07.9 CHEST PAIN, UNSPECIFIED TYPE: Primary | ICD-10-CM

## 2024-10-01 LAB
ALBUMIN SERPL BCP-MCNC: 4.3 G/DL (ref 3.4–5)
ALP SERPL-CCNC: 112 U/L (ref 33–136)
ALT SERPL W P-5'-P-CCNC: 22 U/L (ref 7–45)
ANION GAP SERPL CALC-SCNC: 10 MMOL/L (ref 10–20)
AST SERPL W P-5'-P-CCNC: 23 U/L (ref 9–39)
BASOPHILS # BLD AUTO: 0.03 X10*3/UL (ref 0–0.1)
BASOPHILS NFR BLD AUTO: 0.4 %
BILIRUB SERPL-MCNC: 0.4 MG/DL (ref 0–1.2)
BNP SERPL-MCNC: 25 PG/ML (ref 0–99)
BUN SERPL-MCNC: 24 MG/DL (ref 6–23)
CALCIUM SERPL-MCNC: 10.1 MG/DL (ref 8.6–10.3)
CARDIAC TROPONIN I PNL SERPL HS: <3 NG/L (ref 0–13)
CARDIAC TROPONIN I PNL SERPL HS: <3 NG/L (ref 0–13)
CHLORIDE SERPL-SCNC: 105 MMOL/L (ref 98–107)
CO2 SERPL-SCNC: 27 MMOL/L (ref 21–32)
CREAT SERPL-MCNC: 1.51 MG/DL (ref 0.5–1.05)
EGFRCR SERPLBLD CKD-EPI 2021: 37 ML/MIN/1.73M*2
EOSINOPHIL # BLD AUTO: 0.29 X10*3/UL (ref 0–0.4)
EOSINOPHIL NFR BLD AUTO: 3.4 %
ERYTHROCYTE [DISTWIDTH] IN BLOOD BY AUTOMATED COUNT: 13.5 % (ref 11.5–14.5)
GLUCOSE SERPL-MCNC: 98 MG/DL (ref 74–99)
HCT VFR BLD AUTO: 40.2 % (ref 36–46)
HGB BLD-MCNC: 12.7 G/DL (ref 12–16)
IMM GRANULOCYTES # BLD AUTO: 0.01 X10*3/UL (ref 0–0.5)
IMM GRANULOCYTES NFR BLD AUTO: 0.1 % (ref 0–0.9)
LYMPHOCYTES # BLD AUTO: 1.86 X10*3/UL (ref 0.8–3)
LYMPHOCYTES NFR BLD AUTO: 22.1 %
MAGNESIUM SERPL-MCNC: 1.77 MG/DL (ref 1.6–2.4)
MCH RBC QN AUTO: 28.5 PG (ref 26–34)
MCHC RBC AUTO-ENTMCNC: 31.6 G/DL (ref 32–36)
MCV RBC AUTO: 90 FL (ref 80–100)
MONOCYTES # BLD AUTO: 0.9 X10*3/UL (ref 0.05–0.8)
MONOCYTES NFR BLD AUTO: 10.7 %
NEUTROPHILS # BLD AUTO: 5.34 X10*3/UL (ref 1.6–5.5)
NEUTROPHILS NFR BLD AUTO: 63.3 %
NRBC BLD-RTO: 0 /100 WBCS (ref 0–0)
PLATELET # BLD AUTO: 242 X10*3/UL (ref 150–450)
POTASSIUM SERPL-SCNC: 3.9 MMOL/L (ref 3.5–5.3)
PROT SERPL-MCNC: 7.8 G/DL (ref 6.4–8.2)
RBC # BLD AUTO: 4.46 X10*6/UL (ref 4–5.2)
SODIUM SERPL-SCNC: 138 MMOL/L (ref 136–145)
WBC # BLD AUTO: 8.4 X10*3/UL (ref 4.4–11.3)

## 2024-10-01 PROCEDURE — 84484 ASSAY OF TROPONIN QUANT: CPT

## 2024-10-01 PROCEDURE — 85025 COMPLETE CBC W/AUTO DIFF WBC: CPT

## 2024-10-01 PROCEDURE — 83735 ASSAY OF MAGNESIUM: CPT

## 2024-10-01 PROCEDURE — 71275 CT ANGIOGRAPHY CHEST: CPT

## 2024-10-01 PROCEDURE — 99285 EMERGENCY DEPT VISIT HI MDM: CPT | Mod: 25

## 2024-10-01 PROCEDURE — 2500000004 HC RX 250 GENERAL PHARMACY W/ HCPCS (ALT 636 FOR OP/ED)

## 2024-10-01 PROCEDURE — 83880 ASSAY OF NATRIURETIC PEPTIDE: CPT

## 2024-10-01 PROCEDURE — 2550000001 HC RX 255 CONTRASTS

## 2024-10-01 PROCEDURE — 93005 ELECTROCARDIOGRAM TRACING: CPT

## 2024-10-01 PROCEDURE — 80053 COMPREHEN METABOLIC PANEL: CPT

## 2024-10-01 PROCEDURE — 96361 HYDRATE IV INFUSION ADD-ON: CPT

## 2024-10-01 PROCEDURE — 36415 COLL VENOUS BLD VENIPUNCTURE: CPT

## 2024-10-01 PROCEDURE — 71275 CT ANGIOGRAPHY CHEST: CPT | Performed by: RADIOLOGY

## 2024-10-01 PROCEDURE — 2500000005 HC RX 250 GENERAL PHARMACY W/O HCPCS: Performed by: EMERGENCY MEDICINE

## 2024-10-01 PROCEDURE — 96374 THER/PROPH/DIAG INJ IV PUSH: CPT | Mod: 59

## 2024-10-01 PROCEDURE — 96375 TX/PRO/DX INJ NEW DRUG ADDON: CPT | Mod: 59

## 2024-10-01 RX ORDER — ONDANSETRON HYDROCHLORIDE 2 MG/ML
4 INJECTION, SOLUTION INTRAVENOUS ONCE
Status: COMPLETED | OUTPATIENT
Start: 2024-10-01 | End: 2024-10-01

## 2024-10-01 RX ORDER — MORPHINE SULFATE 4 MG/ML
4 INJECTION, SOLUTION INTRAMUSCULAR; INTRAVENOUS ONCE
Status: COMPLETED | OUTPATIENT
Start: 2024-10-01 | End: 2024-10-01

## 2024-10-01 RX ORDER — ONDANSETRON HYDROCHLORIDE 2 MG/ML
INJECTION, SOLUTION INTRAVENOUS
Status: COMPLETED
Start: 2024-10-01 | End: 2024-10-01

## 2024-10-01 RX ORDER — MORPHINE SULFATE 4 MG/ML
INJECTION, SOLUTION INTRAMUSCULAR; INTRAVENOUS
Status: COMPLETED
Start: 2024-10-01 | End: 2024-10-01

## 2024-10-01 RX ADMIN — MORPHINE SULFATE 4 MG: 4 INJECTION, SOLUTION INTRAMUSCULAR; INTRAVENOUS at 21:35

## 2024-10-01 RX ADMIN — Medication 2 L/MIN: at 23:01

## 2024-10-01 RX ADMIN — ONDANSETRON 4 MG: 2 INJECTION INTRAMUSCULAR; INTRAVENOUS at 21:34

## 2024-10-01 RX ADMIN — ONDANSETRON HYDROCHLORIDE 4 MG: 2 INJECTION, SOLUTION INTRAVENOUS at 21:34

## 2024-10-01 RX ADMIN — IOHEXOL 75 ML: 350 INJECTION, SOLUTION INTRAVENOUS at 22:31

## 2024-10-01 RX ADMIN — SODIUM CHLORIDE 1000 ML: 9 INJECTION, SOLUTION INTRAVENOUS at 21:39

## 2024-10-01 ASSESSMENT — PAIN DESCRIPTION - ORIENTATION: ORIENTATION: RIGHT

## 2024-10-01 ASSESSMENT — PAIN DESCRIPTION - FREQUENCY: FREQUENCY: CONSTANT/CONTINUOUS

## 2024-10-01 ASSESSMENT — HEART SCORE
AGE: 65+
HEART SCORE: 4
HISTORY: SLIGHTLY SUSPICIOUS
TROPONIN: LESS THAN OR EQUAL TO NORMAL LIMIT
RISK FACTORS: >2 RISK FACTORS OR HX OF ATHEROSCLEROTIC DISEASE
ECG: NORMAL

## 2024-10-01 ASSESSMENT — PAIN SCALES - GENERAL
PAINLEVEL_OUTOF10: 10 - WORST POSSIBLE PAIN
PAINLEVEL_OUTOF10: 1
PAINLEVEL_OUTOF10: 5 - MODERATE PAIN

## 2024-10-01 ASSESSMENT — PAIN DESCRIPTION - DESCRIPTORS
DESCRIPTORS: ACHING;STABBING
DESCRIPTORS: SHARP
DESCRIPTORS: SHARP

## 2024-10-01 ASSESSMENT — PAIN DESCRIPTION - LOCATION: LOCATION: RIB CAGE

## 2024-10-01 ASSESSMENT — PAIN DESCRIPTION - PAIN TYPE: TYPE: ACUTE PAIN

## 2024-10-01 ASSESSMENT — PAIN DESCRIPTION - PROGRESSION: CLINICAL_PROGRESSION: GRADUALLY WORSENING

## 2024-10-01 ASSESSMENT — COLUMBIA-SUICIDE SEVERITY RATING SCALE - C-SSRS
6. HAVE YOU EVER DONE ANYTHING, STARTED TO DO ANYTHING, OR PREPARED TO DO ANYTHING TO END YOUR LIFE?: NO
1. IN THE PAST MONTH, HAVE YOU WISHED YOU WERE DEAD OR WISHED YOU COULD GO TO SLEEP AND NOT WAKE UP?: NO
2. HAVE YOU ACTUALLY HAD ANY THOUGHTS OF KILLING YOURSELF?: NO

## 2024-10-01 ASSESSMENT — PAIN - FUNCTIONAL ASSESSMENT: PAIN_FUNCTIONAL_ASSESSMENT: 0-10

## 2024-10-01 ASSESSMENT — PAIN DESCRIPTION - ONSET: ONSET: ONGOING

## 2024-10-02 ENCOUNTER — PRE-ADMISSION TESTING (OUTPATIENT)
Dept: PREADMISSION TESTING | Facility: HOSPITAL | Age: 74
End: 2024-10-02
Payer: MEDICARE

## 2024-10-02 ENCOUNTER — ANESTHESIA EVENT (OUTPATIENT)
Dept: GASTROENTEROLOGY | Facility: HOSPITAL | Age: 74
End: 2024-10-02

## 2024-10-02 VITALS — WEIGHT: 210 LBS | HEIGHT: 62 IN | BODY MASS INDEX: 38.64 KG/M2

## 2024-10-02 LAB
ATRIAL RATE: 75 BPM
P AXIS: 45 DEGREES
P OFFSET: 198 MS
P ONSET: 143 MS
PR INTERVAL: 160 MS
Q ONSET: 223 MS
QRS COUNT: 13 BEATS
QRS DURATION: 80 MS
QT INTERVAL: 360 MS
QTC CALCULATION(BAZETT): 402 MS
QTC FREDERICIA: 387 MS
R AXIS: 9 DEGREES
T AXIS: 37 DEGREES
T OFFSET: 403 MS
VENTRICULAR RATE: 75 BPM

## 2024-10-02 ASSESSMENT — DUKE ACTIVITY SCORE INDEX (DASI)
CAN YOU DO YARD WORK LIKE RAKING LEAVES, WEEDING OR PUSHING A MOWER: NO
CAN YOU TAKE CARE OF YOURSELF (EAT, DRESS, BATHE, OR USE TOILET): YES
CAN YOU DO LIGHT WORK AROUND THE HOUSE LIKE DUSTING OR WASHING DISHES: YES
CAN YOU CLIMB A FLIGHT OF STAIRS OR WALK UP A HILL: NO
CAN YOU WALK A BLOCK OR TWO ON LEVEL GROUND: YES
CAN YOU WALK INDOORS, SUCH AS AROUND YOUR HOUSE: YES
CAN YOU RUN A SHORT DISTANCE: NO
CAN YOU DO HEAVY WORK AROUND THE HOUSE LIKE SCRUBBING FLOORS OR LIFTING AND MOVING HEAVY FURNITURE: NO
CAN YOU PARTICIPATE IN MODERATE RECREATIONAL ACTIVITIES LIKE GOLF, BOWLING, DANCING, DOUBLES TENNIS OR THROWING A BASEBALL OR FOOTBALL: NO
CAN YOU PARTICIPATE IN STRENOUS SPORTS LIKE SWIMMING, SINGLES TENNIS, FOOTBALL, BASKETBALL, OR SKIING: NO
CAN YOU DO MODERATE WORK AROUND THE HOUSE LIKE VACUUMING, SWEEPING FLOORS OR CARRYING GROCERIES: NO

## 2024-10-02 NOTE — ANESTHESIA PREPROCEDURE EVALUATION
Patient: Soni Cleveland    Procedure Information       Date/Time: 10/21/24 0830    Scheduled providers: Mathieu Cornelius MD    Procedure: COLONOSCOPY    Location: [facility]            Relevant Problems   Cardiac   (+) Abnormal EKG   (+) Elevated cholesterol   (+) Primary hypertension      Pulmonary   (+) Mild intermittent asthma without complication (HHS-HCC)   (+) Obstructive sleep apnea   (+) Pulmonary hypertension (Multi)      Neuro   (+) Depression with anxiety   (+) Migraine   (+) Polyneuropathy, unspecified      GI   (+) Bright red rectal bleeding   (+) Cervical dysphagia   (+) Dysphagia   (+) Gastrointestinal hemorrhage   (+) Gastrointestinal hemorrhage with melena      Liver   (+) Fatty (change of) liver, not elsewhere classified      Endocrine   (+) Adrenal mass 1 cm to 4 cm in diameter (Multi)   (+) Class 2 severe obesity due to excess calories with serious comorbidity and body mass index (BMI) of 39.0 to 39.9 in adult      Hematology   (+) Deep vein thrombosis (DVT) (Multi)   (+) Iron deficiency anemia, unspecified      Musculoskeletal   (+) Chronic left-sided low back pain with left-sided sciatica      HEENT   (+) Asymmetric SNHL (sensorineural hearing loss)   (+) Presbyacusis      Nervous   (+) Restless legs syndrome      Circulatory   (+) Agatston coronary artery calcium score less than 100   (+) Diastolic heart failure   (+) Heart palpitations   (+) Hypertensive heart and chronic kidney disease with heart failure and stage 1 through stage 4 chronic kidney disease, or unspecified chronic kidney disease   (+) Mild ascending aorta dilatation (CMS-HCC)      Genitourinary   (+) Stage 3b chronic kidney disease (Multi)      Coag and Thromboembolic   (+) History of pulmonary embolism      Cardiac and Vasculature   (+) Dyslipidemia     There were no vitals filed for this visit.    Past Surgical History:   Procedure Laterality Date    APPENDECTOMY  03/19/2015    Appendectomy    CARDIAC  CATHETERIZATION  December 2018    HYSTERECTOMY  03/19/2015    Hysterectomy    MR HEAD ANGIO WO IV CONTRAST  02/21/2012    MR HEAD ANGIO WO IV CONTRAST [facility]    MR NECK ANGIO WO IV CONTRAST  02/21/2012    MR NECK ANGIO WO IV CONTRAST [facility]    TONSILLECTOMY  03/19/2015    Tonsillectomy     Past Medical History:   Diagnosis Date    Acute pulmonary embolism (Multi) 12/15/2023    Acute pulmonary embolism without acute cor pulmonale (Multi) 12/15/2023    Anxiety     Asymmetrical sensorineural hearing loss 03/09/2018    Asymmetrical left sensorineural hearing loss    Chronic deep vein thrombosis (DVT) of lower extremity (Multi) 01/25/2023    Chronic fatigue 03/24/2021    History of chronic fatigue    Chronic kidney disease     Chronic kidney disease, stage 3a (Multi) 02/19/2021    now Stage 3B CKD  Aug 2023    Clotting disorder (Multi) 5/07/2019    Depression     Fall 01/25/2023    Subsequent encounter    GI bleeding 01/25/2023    Gross hematuria 01/25/2023    Hypertension     Impacted cerumen, right ear 02/27/2018    Impacted cerumen of right ear    Tinnitus, bilateral 08/18/2020    Tinnitus, bilateral    Urinary tract infection 07/08/2021    Visual impairment        Current Outpatient Medications:     acetaminophen (Tylenol) 325 mg tablet, Take 2 tablets (650 mg) by mouth every 6 hours if needed., Disp: , Rfl:     apixaban (Eliquis) 5 mg tablet, Take 0.5 tablets (2.5 mg) by mouth 2 times a day., Disp: 90 tablet, Rfl: 3    artificial tears, dextran-hypomel-glycerin, 0.1-0.3-0.2 % ophthalmic solution, Administer into affected eye(s)., Disp: , Rfl:     atorvastatin (Lipitor) 40 mg tablet, Take 1 tablet (40 mg) by mouth once daily at bedtime., Disp: 90 tablet, Rfl: 3    bisoprolol (Zebeta) 5 mg tablet, TAKE 1/2 TABLET BY MOUTH DAILY, Disp: 45 tablet, Rfl: 2    cholecalciferol (Vitamin D-3) 25 MCG (1000 UT) tablet, Take 2 tablets (2,000 Units) by mouth once daily., Disp: , Rfl:     denosumab  (Prolia) 60 mg/mL syringe, Prolia, Disp: , Rfl:     diclofenac sodium (Voltaren Arthritis Pain) 1 % gel, Apply 4.5 inches (4 g) topically 4 times a day as needed (pain)., Disp: 100 g, Rfl: 0    escitalopram (Lexapro) 10 mg tablet, Take 1 tablet (10 mg) by mouth once daily., Disp: 90 tablet, Rfl: 1    HAIR, SKIN AND NAILS, BIOTIN, ORAL, Take by mouth once daily., Disp: , Rfl:     ketoconazole (NIZOral) 2 % cream, , Disp: , Rfl:     lidocaine (Lidoderm) 5 % patch, Place 1 patch over 12 hours on the skin once daily. Remove & discard patch within 12 hours or as directed by MD., Disp: 12 patch, Rfl: 0    lisinopril 10 mg tablet, Take 1 tablet (10 mg) by mouth once daily., Disp: 90 tablet, Rfl: 3    minoxidil (Rogaine) 2 % external solution, Apply topically 2 times a day., Disp: , Rfl:     nitroglycerin (Nitrostat) 0.4 mg SL tablet, Place 1 tablet (0.4 mg) under the tongue every 5 minutes if needed for chest pain. DISSOLVE 1 TABLET UNDER THE TONGUE EVERY 5 MINUTES FOR UP TO 3 DOSES AS NEEDED FOR CHEST PAIN.CALL 911 IF PAIN PERSISTS., Disp: 90 tablet, Rfl: 1    sod sulf-pot chloride-mag sulf 1.479-0.188- 0.225 gram tablet, Take 12 tablets by mouth the day before and 12 tablets by mouth the day of colonoscopy per included instructions. Finish prep per instructions., Disp: 24 tablet, Rfl: 0    triamcinolone (Kenalog) 0.1 % cream, Apply topically 2 times a day. Apply to affected area 1-2 times daily as needed. Avoid face and groin., Disp: 30 g, Rfl: 0  No current facility-administered medications for this visit.  Prior to Admission medications    Medication Sig Start Date End Date Taking? Authorizing Provider   acetaminophen (Tylenol) 325 mg tablet Take 2 tablets (650 mg) by mouth every 6 hours if needed. 5/6/19   Historical Provider, MD   apixaban (Eliquis) 5 mg tablet Take 0.5 tablets (2.5 mg) by mouth 2 times a day. 8/7/24 8/7/25  Monika Cruz, APRN-CNP   artificial tears, dextran-hypomel-glycerin, 0.1-0.3-0.2 % ophthalmic  solution Administer into affected eye(s).    Historical Provider, MD   atorvastatin (Lipitor) 40 mg tablet Take 1 tablet (40 mg) by mouth once daily at bedtime. 9/13/23 9/12/24  Vanessa Dsouza PA-C   bisoprolol (Zebeta) 5 mg tablet TAKE 1/2 TABLET BY MOUTH DAILY 8/26/24   HANY Silva   cholecalciferol (Vitamin D-3) 25 MCG (1000 UT) tablet Take 2 tablets (2,000 Units) by mouth once daily. 7/5/22   Historical Provider, MD   denosumab (Prolia) 60 mg/mL syringe Prolia    Historical Provider, MD   diclofenac sodium (Voltaren Arthritis Pain) 1 % gel Apply 4.5 inches (4 g) topically 4 times a day as needed (pain). 9/25/24   Sha Oakley MD   escitalopram (Lexapro) 10 mg tablet Take 1 tablet (10 mg) by mouth once daily. 7/5/24   Vanessa Dsouza PA-C   HAIR, SKIN AND NAILS, BIOTIN, ORAL Take by mouth once daily.    Historical Provider, MD   ketoconazole (NIZOral) 2 % cream  9/18/23   Historical Provider, MD   lidocaine (Lidoderm) 5 % patch Place 1 patch over 12 hours on the skin once daily. Remove & discard patch within 12 hours or as directed by MD. 9/25/24   Sha Oakley MD   lisinopril 10 mg tablet Take 1 tablet (10 mg) by mouth once daily. 9/13/23 9/12/24  Vanessa sDouza PA-C   minoxidil (Rogaine) 2 % external solution Apply topically 2 times a day.    Historical Provider, MD   nitroglycerin (Nitrostat) 0.4 mg SL tablet Place 1 tablet (0.4 mg) under the tongue every 5 minutes if needed for chest pain. DISSOLVE 1 TABLET UNDER THE TONGUE EVERY 5 MINUTES FOR UP TO 3 DOSES AS NEEDED FOR CHEST PAIN.CALL 911 IF PAIN PERSISTS. 8/7/24   HANY Silva   sod sulf-pot chloride-mag sulf 1.479-0.188- 0.225 gram tablet Take 12 tablets by mouth the day before and 12 tablets by mouth the day of colonoscopy per included instructions. Finish prep per instructions. 7/22/24   HANY Calderon   triamcinolone (Kenalog) 0.1 % cream Apply topically 2 times a day. Apply to affected area 1-2 times daily as  needed. Avoid face and groin. 23   Vanessa Dsouza PA-C     Allergies   Allergen Reactions    Penicillin Hives and Rash     Prescribed ceftriaxone in 2021     Social History     Tobacco Use    Smoking status: Former     Current packs/day: 0.00     Average packs/day: 1 pack/day for 20.0 years (20.0 ttl pk-yrs)     Types: Cigarettes     Start date: 1970     Quit date: 1980     Years since quittin.3    Smokeless tobacco: Never    Tobacco comments:     Was a smoker for about 10 yrs   Substance Use Topics    Alcohol use: Not Currently         Chemistry    Lab Results   Component Value Date/Time     10/01/2024 2048    K 3.9 10/01/2024 2048     10/01/2024 2048    CO2 27 10/01/2024 2048    BUN 24 (H) 10/01/2024 2048    CREATININE 1.51 (H) 10/01/2024 2048    Lab Results   Component Value Date/Time    CALCIUM 10.1 10/01/2024 2048    ALKPHOS 112 10/01/2024 2048    AST 23 10/01/2024 2048    ALT 22 10/01/2024 2048    BILITOT 0.4 10/01/2024 2048          Lab Results   Component Value Date/Time    WBC 8.4 10/01/2024 2048    HGB 12.7 10/01/2024 2048    HCT 40.2 10/01/2024 2048     10/01/2024 2048     Lab Results   Component Value Date/Time    PROTIME 9.8 2018 09    INR 0.9 201830     Encounter Date: 10/01/24   ECG 12 lead   Result Value    Ventricular Rate 75    Atrial Rate 75    NY Interval 160    QRS Duration 80    QT Interval 360    QTC Calculation(Bazett) 402    P Axis 45    R Axis 9    T Axis 37    QRS Count 13    Q Onset 223    P Onset 143    P Offset 198    T Offset 403    QTC Fredericia 387    Narrative    Normal sinus rhythm  Possible Left atrial enlargement  Nonspecific ST and T wave abnormality  Abnormal ECG  When compared with ECG of 19-SEP-2024 16:46,  Nonspecific T wave abnormality now evident in Anterior leads  See ED provider note for full interpretation and clinical correlation  Confirmed by Sully Miguel ([id number]) on 10/2/2024 10:15:21 AM     No  results found for this or any previous visit from the past 1095 days.    Clinical information reviewed:                   Chart reviewed.  Cardiac clearance requested.    Echo 7/8/21-  CONCLUSIONS:   1. The left ventricular systolic function is normal with a 60-65% estimated ejection fraction.   2. Spectral Doppler shows an impaired relaxation pattern of left ventricular diastolic filling.      [facility] 2018-  CONCLUSIONS:   1. Chest pain for evaluation      Angiographically: Rt dominant system      Minor Nonobstructive Coronary atherosclerosis      NPO Detail:  No data recorded     PHYSICAL EXAM    Anesthesia Plan

## 2024-10-02 NOTE — ED PROVIDER NOTES
HPI   Chief Complaint   Patient presents with    Chest Pain     Pt here with complaints of right rib pain since last week. Says she fell a few weeks ago and had imaging but her pain is getting worse. States she can't take a deep breath because the pain is so bad. She has been using lidocaine patches with no relief.       Patient is a 74-year-old female with significant PMH of PE on Eliquis, chronic kidney disease presents to the ED with cc of right-sided chest pain times couple weeks.  Patient fell a couple weeks ago and had imaging performed that was negative.  Patient has returned a couple times for similar symptoms last x-ray was on the 25th.  Patient states she has shortness of breath worse with exertion.  Patient states chest pain is worse with deep breaths.  Patient states she has not been using an incentive spirometer was never prescribed 1.  Patient denies any fever chills congestion cough nausea vomiting abdominal pain diarrhea constipation.  Patient denies any recent travel or surgery.  Patient denies any history of MIs.  Patient denies any tobacco alcohol or street drug abuse patient denies any history of COPD.              Patient History   Past Medical History:   Diagnosis Date    Acute pulmonary embolism (Multi) 12/15/2023    Acute pulmonary embolism without acute cor pulmonale (Multi) 12/15/2023    Anxiety     Asymmetrical sensorineural hearing loss 03/09/2018    Asymmetrical left sensorineural hearing loss    Chronic deep vein thrombosis (DVT) of lower extremity (Multi) 01/25/2023    Chronic fatigue 03/24/2021    History of chronic fatigue    Chronic kidney disease     Chronic kidney disease, stage 3a (Multi) 02/19/2021    now Stage 3B CKD  Aug 2023    Clotting disorder (Multi) 5/07/2019    Depression     Fall 01/25/2023    Subsequent encounter    GI bleeding 01/25/2023    Gross hematuria 01/25/2023    Hypertension     Impacted cerumen, right ear 02/27/2018    Impacted cerumen of right ear     Tinnitus, bilateral 2020    Tinnitus, bilateral    Urinary tract infection 2021    Visual impairment      Past Surgical History:   Procedure Laterality Date    APPENDECTOMY  2015    Appendectomy    CARDIAC CATHETERIZATION  2018    HYSTERECTOMY  2015    Hysterectomy    MR HEAD ANGIO WO IV CONTRAST  2012    MR HEAD ANGIO WO IV CONTRAST [facility]    MR NECK ANGIO WO IV CONTRAST  2012    MR NECK ANGIO WO IV CONTRAST [facility]    TONSILLECTOMY  2015    Tonsillectomy     Family History   Problem Relation Name Age of Onset    Cirrhosis Mother      Stroke Father Johan         CVA    Heart failure Father Johan         Congestive    Gout Father Johan     Heart attack Father Johan         Myocardial infarction    Heart disease Father Johan     Colon cancer Sister Miguel (colon cancer)     Cancer Sister Miguel (colon cancer)     COPD Brother Jayesh     Multiple sclerosis Sister Danelle      Social History     Tobacco Use    Smoking status: Former     Current packs/day: 0.00     Average packs/day: 1 pack/day for 20.0 years (20.0 ttl pk-yrs)     Types: Cigarettes     Start date: 1970     Quit date: 1980     Years since quittin.3    Smokeless tobacco: Never    Tobacco comments:     Was a smoker for about 10 yrs   Vaping Use    Vaping status: Never Used   Substance Use Topics    Alcohol use: Not Currently    Drug use: Never       Physical Exam   ED Triage Vitals [10/01/24 2025]   Temperature Heart Rate Respirations BP   36.2 °C (97.1 °F) 73 18 162/85      Pulse Ox Temp Source Heart Rate Source Patient Position   95 % Temporal Monitor --      BP Location FiO2 (%)     -- --       Physical Exam  Cardiovascular:      Rate and Rhythm: Normal rate and regular rhythm.      Heart sounds: Normal heart sounds.   Pulmonary:      Effort: Pulmonary effort is normal.      Breath sounds: No wheezing or rales.   Chest:      Chest wall: Tenderness present.   Skin:      Findings: No rash.   Neurological:      General: No focal deficit present.      Mental Status: She is alert and oriented to person, place, and time.           ED Course & MDM   ED Course as of 10/21/24 1245   Mon Oct 21, 2024   1244 EKG interpretation performed at 2031 normal sinus rhythm normal axis no acute signs of ischemia.  Ventricular rate 75 bpm []      ED Course User Index  [] Mary BhattMARY         Diagnoses as of 10/21/24 1245   Chest pain, unspecified type   Closed fracture of one rib of right side, initial encounter                 No data recorded     [place] Coma Scale Score: 15 (10/01/24 2048 : Maite Lewis, GERARDO)                           Medical Decision Making  Medical Decision Making:  Patient presented as described in HPI. Patient case including ROS, PE, and treatment and plan discussed with ED attending if attached as cosigner. Due to patients presentation orders completed include as documented.  Patient presents to the ED with cc chest pain the last couple weeks.  Patient states progressively worsening.  Patient states it is pleuritic.  Patient is nontoxic-appearing abdomen is soft and nontender pain on palpation to the right ribs lung sounds are clear.  Labs are unremarkable continues to show kidney disease.  Pending imaging.  Patient's care continued by ER attending on dispo once results return.          Patient care discussed with: N/A  Social Determinants affecting care: N/A    Final diagnosis and disposition as below.  See CI           This note has been transcribed using voice recognition and may contain grammatical errors, misplaced words, incorrect words, incorrect phrases or other errors.        Labs Reviewed   CBC WITH AUTO DIFFERENTIAL - Abnormal       Result Value    WBC 8.4      nRBC 0.0      RBC 4.46      Hemoglobin 12.7      Hematocrit 40.2      MCV 90      MCH 28.5      MCHC 31.6 (*)     RDW 13.5      Platelets 242      Neutrophils % 63.3      Immature  Granulocytes %, Automated 0.1      Lymphocytes % 22.1      Monocytes % 10.7      Eosinophils % 3.4      Basophils % 0.4      Neutrophils Absolute 5.34      Immature Granulocytes Absolute, Automated 0.01      Lymphocytes Absolute 1.86      Monocytes Absolute 0.90 (*)     Eosinophils Absolute 0.29      Basophils Absolute 0.03     COMPREHENSIVE METABOLIC PANEL - Abnormal    Glucose 98      Sodium 138      Potassium 3.9      Chloride 105      Bicarbonate 27      Anion Gap 10      Urea Nitrogen 24 (*)     Creatinine 1.51 (*)     eGFR 37 (*)     Calcium 10.1      Albumin 4.3      Alkaline Phosphatase 112      Total Protein 7.8      AST 23      Bilirubin, Total 0.4      ALT 22     B-TYPE NATRIURETIC PEPTIDE - Normal    BNP 25      Narrative:        <100 pg/mL - Heart failure unlikely  100-299 pg/mL - Intermediate probability of acute heart                  failure exacerbation. Correlate with clinical                  context and patient history.    >=300 pg/mL - Heart Failure likely. Correlate with clinical                  context and patient history.    BNP testing is performed using different testing methodology at [facility] than at other [facility]. Direct result comparisons should only be made within the same method.      SERIAL TROPONIN-INITIAL - Normal    Troponin I, High Sensitivity <3      Narrative:     Less than 99th percentile of normal range cutoff-  Female and children under 18 years old <14 ng/L; Male <21 ng/L: Negative  Repeat testing should be performed if clinically indicated.     Female and children under 18 years old 14-50 ng/L; Male 21-50 ng/L:  Consistent with possible cardiac damage and possible increased clinical   risk. Serial measurements may help to assess extent of myocardial damage.     >50 ng/L: Consistent with cardiac damage, increased clinical risk and  myocardial infarction. Serial measurements may help assess extent of   myocardial damage.      NOTE: Children less  than 1 year old may have higher baseline troponin   levels and results should be interpreted in conjunction with the overall   clinical context.     NOTE: Troponin I testing is performed using a different   testing methodology at [facility] than at other   [facility] hospitals. Direct result comparisons should only   be made within the same method.   MAGNESIUM - Normal    Magnesium 1.77     TROPONIN SERIES- (INITIAL, 1 HR)    Narrative:     The following orders were created for panel order Troponin I Series, High Sensitivity (0, 1 HR).  Procedure                               Abnormality         Status                     ---------                               -----------         ------                     Troponin I, High Sensiti...[[id number]]  Normal              Final result               Troponin, High Sensitivi...[[id number]]                                                   Please view results for these tests on the individual orders.   SERIAL TROPONIN, 1 HOUR        Procedure  Procedures     Mary Bhatt PA-C  10/01/24 2226       Mary Bhatt PA-C  10/21/24 1243

## 2024-10-02 NOTE — ED TRIAGE NOTES
Pt here with complaints of right rib pain since last week. Says she fell a few weeks ago and had imaging but her pain is getting worse. States she can't take a deep breath because the pain is so bad. She has been using lidocaine patches with no relief.

## 2024-10-02 NOTE — PREPROCEDURE INSTRUCTIONS
Medication List            Accurate as of October 2, 2024  2:38 PM. Always use your most recent med list.                acetaminophen 325 mg tablet  Commonly known as: Tylenol  Medication Adjustments for Surgery: Take last dose 1 day (24 hours) before surgery     apixaban 5 mg tablet  Commonly known as: Eliquis  Take 0.5 tablets (2.5 mg) by mouth 2 times a day.  Additional Medication Adjustments for Surgery: Other (Comment)     artificial tears (dextran-hypomel-glycerin) 0.1-0.3-0.2 % ophthalmic solution  Medication Adjustments for Surgery: Take last dose 1 day (24 hours) before surgery     atorvastatin 40 mg tablet  Commonly known as: Lipitor  Take 1 tablet (40 mg) by mouth once daily at bedtime.  Medication Adjustments for Surgery: Take last dose 1 day (24 hours) before surgery     bisoprolol 5 mg tablet  Commonly known as: Zebeta  TAKE 1/2 TABLET BY MOUTH DAILY  Medication Adjustments for Surgery: Take on the morning of surgery     cholecalciferol 25 MCG (1000 UT) tablet  Commonly known as: Vitamin D-3  Medication Adjustments for Surgery: Take last dose 1 day (24 hours) before surgery     diclofenac sodium 1 % gel  Commonly known as: Voltaren Arthritis Pain  Apply 4.5 inches (4 g) topically 4 times a day as needed (pain).  Medication Adjustments for Surgery: Take last dose 1 day (24 hours) before surgery     escitalopram 10 mg tablet  Commonly known as: Lexapro  Take 1 tablet (10 mg) by mouth once daily.  Medication Adjustments for Surgery: Take on the morning of surgery     HAIR, SKIN AND NAILS (BIOTIN) ORAL  Medication Adjustments for Surgery: Take last dose 1 day (24 hours) before surgery     ketoconazole 2 % cream  Commonly known as: NIZOral  Medication Adjustments for Surgery: Take last dose 1 day (24 hours) before surgery     lidocaine 5 % patch  Commonly known as: Lidoderm  Place 1 patch over 12 hours on the skin once daily. Remove & discard patch within 12 hours or as directed by MD.  Medication  Adjustments for Surgery: Take last dose 1 day (24 hours) before surgery     lisinopril 10 mg tablet  Take 1 tablet (10 mg) by mouth once daily.  Medication Adjustments for Surgery: Take last dose 1 day (24 hours) before surgery     minoxidil 2 % external solution  Commonly known as: Rogaine  Medication Adjustments for Surgery: Take last dose 1 day (24 hours) before surgery     nitroglycerin 0.4 mg SL tablet  Commonly known as: Nitrostat  Place 1 tablet (0.4 mg) under the tongue every 5 minutes if needed for chest pain. DISSOLVE 1 TABLET UNDER THE TONGUE EVERY 5 MINUTES FOR UP TO 3 DOSES AS NEEDED FOR CHEST PAIN.CALL 911 IF PAIN PERSISTS.     Prolia 60 mg/mL syringe  Generic drug: denosumab     Sutab 1.479-0.188- 0.225 gram tablet  Generic drug: sod sulf-pot chloride-mag sulf  Take 12 tablets by mouth the day before and 12 tablets by mouth the day of colonoscopy per included instructions. Finish prep per instructions.  Medication Adjustments for Surgery: Take on the morning of surgery     triamcinolone 0.1 % cream  Commonly known as: Kenalog  Apply topically 2 times a day. Apply to affected area 1-2 times daily as needed. Avoid face and groin.  Medication Adjustments for Surgery: Take last dose 1 day (24 hours) before surgery                              NPO Instructions:    Follow instructions. Day before procedure- CLEAR LIQUIDS ONLY-No dairy products, nothing red/purple.  Take first part of prep- Evening Before Procedure.  Drink lots of liquids.  Day of Procedure- Take Second Part of Prep take 5 hours beforehand.  STOP DRINKING 3 HOURS PRIOR TO PROCEDURE.  Take medications as instructed.       Additional Instructions:     Review your medication instructions, take indicated medications  Wear  comfortable loose fitting clothing  All jewelry and valuables should be left at home    Park in back of hospital by ER. Come up to Second floor-Outpt dept to check in.  Bring Photo ID and Insurance card,   You MUST have a   with you.  No more than 2 visitors with you please.      If you get ill at all before your procedure- CALL YOUR DOCTOR/SURGEON.  We want you in the best shape that is possible. Any sickness might lead to your procedure being delayed.      Call Outpatient dept at [phone] the night before your procedure (Friday for Monday procedure), between 1-3 pm.        Requesting a cardiac clearance and if you can hold eliquis. We will let you know.

## 2024-10-04 ENCOUNTER — APPOINTMENT (OUTPATIENT)
Dept: PHARMACY | Facility: HOSPITAL | Age: 74
End: 2024-10-04
Payer: MEDICARE

## 2024-10-04 DIAGNOSIS — Z86.711 HISTORY OF PULMONARY EMBOLISM: ICD-10-CM

## 2024-10-04 NOTE — PROGRESS NOTES
Clinical Pharmacy Appointment    Patient ID: Soni Cleveland is a 74 y.o. female who presents for First appointment.     Referring Provider: Monika Cruz, APRN-CNP  PCP: Vanessa Dsouza PA-C   Last visit with PCP: 7/30/24   Next visit with PCP: 10/8/24    Subjective     ANTICOAGULATION  Does patient follow with cardiology? Yes    Date: 8/8/2025    Anticoagulation Indication: prevention of venous thromboembolism   Patient is projected to be on anticoagulation indefinitely.     Current anticoagulants include:  Apixaban 2.5 mg BID   Unprovoked B/L PE in July 2021  Heme/onc recommends lifelong anticoagulation with 2.5 mg BID    Adverse Effects: No significant side effects; some bruising.     Monitoring:  Date of last BMP: 10/1/24  Recent Hospitalizations: None   Recent Falls/Trauma: Yes, 9/19/24. Tripped on rug and hit face on floor. Presented to ED for evaluation. Rib and nose fracture. All bruising healed at this time.    Changes in Tobacco or Alcohol Intake: None     Medication Reconciliation:  No changes    Drug Interactions  No relevant drug interactions were noted.    Medication System Management  Patient's preferred pharmacy: [facility]   Adherence/Organization: No issues   Affordability/Accessibility: Eliquis - around $100 per month    Objective   Allergies   Allergen Reactions    Penicillin Hives and Rash     Prescribed ceftriaxone in July 2021     Social History     Social History Narrative    Not on file      Medication Review  Current Outpatient Medications   Medication Instructions    acetaminophen (TYLENOL) 650 mg, oral, Every 6 hours PRN    apixaban (ELIQUIS) 2.5 mg, oral, 2 times daily    artificial tears, dextran-hypomel-glycerin, 0.1-0.3-0.2 % ophthalmic solution ophthalmic (eye)    atorvastatin (LIPITOR) 40 mg, oral, Nightly    bisoprolol (ZEBETA) 2.5 mg, oral, Daily    cholecalciferol (VITAMIN D-3) 2,000 Units, oral, Daily RT    denosumab (Prolia) 60 mg/mL syringe Prolia    diclofenac sodium  (VOLTAREN ARTHRITIS PAIN) 4 g, Topical, 4 times daily PRN    escitalopram (LEXAPRO) 10 mg, oral, Daily    HAIR, SKIN AND NAILS, BIOTIN, ORAL oral, Daily RT    ketoconazole (NIZOral) 2 % cream     lidocaine (Lidoderm) 5 % patch 1 patch, transdermal, Daily, Remove & discard patch within 12 hours or as directed by MD.    lisinopril 10 mg, oral, Daily    minoxidil (Rogaine) 2 % external solution Topical, 2 times daily    nitroglycerin (NITROSTAT) 0.4 mg, sublingual, Every 5 min PRN, DISSOLVE 1 TABLET UNDER THE TONGUE EVERY 5 MINUTES FOR UP TO 3 DOSES AS NEEDED FOR CHEST PAIN.CALL 911 IF PAIN PERSISTS.    sod sulf-pot chloride-mag sulf 1.479-0.188- 0.225 gram tablet Take 12 tablets by mouth the day before and 12 tablets by mouth the day of colonoscopy per included instructions. Finish prep per instructions.    triamcinolone (Kenalog) 0.1 % cream Topical, 2 times daily, Apply to affected area 1-2 times daily as needed. Avoid face and groin.      Vitals  BP Readings from Last 2 Encounters:   10/01/24 112/58   09/25/24 116/65     BMI Readings from Last 1 Encounters:   10/02/24 38.41 kg/m²      Labs  A1C  Lab Results   Component Value Date    HGBA1C 5.3 08/22/2023    HGBA1C 6.0 07/10/2021    HGBA1C 5.8 01/14/2019     BMP  Lab Results   Component Value Date    CALCIUM 10.1 10/01/2024     10/01/2024    K 3.9 10/01/2024    CO2 27 10/01/2024     10/01/2024    BUN 24 (H) 10/01/2024    CREATININE 1.51 (H) 10/01/2024    EGFR 37 (L) 10/01/2024     LFTs  Lab Results   Component Value Date    ALT 22 10/01/2024    AST 23 10/01/2024    ALKPHOS 112 10/01/2024    BILITOT 0.4 10/01/2024     FLP  Lab Results   Component Value Date    TRIG 175 (H) 06/01/2024    CHOL 184 06/01/2024    LDLF 76 03/18/2023    LDLCALC 88 06/01/2024    HDL 60.9 06/01/2024     Urine Microalbumin  Lab Results   Component Value Date    MICROALBCREA 9.8 03/18/2023     Weight Management  Wt Readings from Last 3 Encounters:   10/02/24 95.3 kg (210 lb)    10/01/24 95.3 kg (210 lb)   09/25/24 95.3 kg (210 lb)      There is no height or weight on file to calculate BMI.     Assessment/Plan   Problem List Items Addressed This Visit       History of pulmonary embolism     Rationale for plan: Patient is to be on anticoagulation indefinitely. At this time, patient denies any side effects with current anticoagulation regimen. Does endorse recent fall but was evaluated in ED and reports symptoms/pain are improving. Reports primary concern is related to copay associated with Eliquis therapy. Patient is NOT enrolled in  PAP for Eliquis. Screening completed during visit today and patient appears to be eligible. Will complete  PAP application. Patient to drop off forms to [facility] outpatient pharmacy on 10/8/24.     Medication Changes: Switch from 5 mg to 2.5 mg tablets with PAP.   CONTINUE Eliquis 2.5 mg BID     Monitoring and Education Discussed:  Counseled patient of side effects that are indicative of bleeding such as dark tarry stool, unexplainable bruising, or vomiting up a coffee ground like substance  Reminded patient of importance of anticoagulation therapy to prevent risk of heart attack or stroke  Counseled patient on MOA, expectations, duration of therapy, contraindications, administration, and monitoring parameters   Answered all patient questions and concerns    Clinical Pharmacist follow-up: as needed for PAP enrollment, Telehealth visit    Continue all meds under the continuation of care with the referring provider and clinical pharmacy team.    Thank you,  Maite Jane, PharmD  Clinical Pharmacist  [phone]    Verbal consent to manage patient's drug therapy was obtained from the patient. They were informed they may decline to participate or withdraw from participation in pharmacy services at any time.

## 2024-10-07 DIAGNOSIS — F32.A ANXIETY AND DEPRESSION: ICD-10-CM

## 2024-10-07 DIAGNOSIS — F41.9 ANXIETY AND DEPRESSION: ICD-10-CM

## 2024-10-07 RX ORDER — ESCITALOPRAM OXALATE 10 MG/1
10 TABLET ORAL DAILY
Qty: 90 TABLET | Refills: 1 | Status: SHIPPED | OUTPATIENT
Start: 2024-10-07

## 2024-10-07 NOTE — PROGRESS NOTES
Subjective     HPI   Soni Cleveland is a 74 y.o. year old female patient with presenting to clinic with concern for   Chief Complaint   Patient presents with    3 month medical management    Fall     3 weeks ago- slipped on throw rug in kitchen-fell on face -  nose/ teth issues- went to ED-    1 week later woke up in night and couldn't breath-  called EMS-  Has bruised/ cracked rib  Then- Slipped out of bed 1 week ago  Has bruising on right side          Tripped on fall rug and fell. Cracked rib 10/1/24- noted after she awoke with difficulty breathing after the fall- she  called 911 and went to ER noted to have bruising on her side which persists.     Hasn't felt off balance, but has noted gait change in the past few years. Has been more careful of foot placement. I recommend physical therapy    Eliquis is very expensive at the lower dose. She says she can't afford the 2.5mg bid. Continues the 5mg bid.     Frequent Bms after eating. This has been an ongoing issue.  Has upcoming colonoscopy    Needs to be on Rx Ca and Vit D.     Shaky hands at times in the mornings and times of stress. Infrequent.   Itching hands and dry skin. Hx dryness.     May 2024 will need colonoscopy ordered properly    Prolia infusion coming up soon.     Previous visit- Recommended covid vaccine update and seasonal flu and RSV vaccine if eligible and Boostrix Tdap.     Has had a pneumococcal vaccine.  Has had 5 covid vaccines but has not had bivalent or upto date within 6 months.    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     NonCompliant with CPAP- she does not use her CPAP. Uncomfortable. Tried variations without benefit.     Depressed- retired from the schools at the same time as her  immediately before COVID pandemic. She is frustrated and sad that she put on weight and became sedentary while he has become very active and fit.     Patient Active Problem List   Diagnosis    Abnormal EKG     Abnormally compliant left middle ear system with type AD tympanogram curve    Adrenal nodule    Agatston coronary artery calcium score less than 100    Age-related nuclear cataract, bilateral    Allergic reaction    Angina pectoris    Asymmetric SNHL (sensorineural hearing loss)    Primary hypertension    Chronic fatigue    Class 2 severe obesity due to excess calories with serious comorbidity and body mass index (BMI) of 39.0 to 39.9 in adult    Depression with anxiety    Dry eyes    Dyslipidemia    Dysphagia    Gait abnormality    Heart palpitations    Mild intermittent asthma without complication (HHS-HCC)    Neck pain    Obstructive sleep apnea    Posterior vitreous detachment of both eyes    Presbyacusis    Renal cyst    Restless legs syndrome    Stage 3b chronic kidney disease (Multi)    Stress incontinence in female    Tinnitus of both ears    Tubular adenoma of colon    Vitamin D deficiency    Vitreous floaters    Decreased hearing    Chronic left-sided low back pain with left-sided sciatica    Mild ascending aorta dilatation (CMS-HCC)    Age-related osteoporosis without current pathological fracture    Gastrointestinal hemorrhage    Bright red rectal bleeding    Shortness of breath    Adrenal mass 1 cm to 4 cm in diameter (Multi)    Vertigo    Posterior vitreous detachment    Osteopenia    Nuclear senile cataract    Migraine    Metabolic syndrome    Memory impairment    History of pulmonary embolism    Gastrointestinal hemorrhage with melena    Elevated cholesterol    Diastolic heart failure    Deep vein thrombosis (DVT) (Multi)    Cough    Nonscarring hair loss, unspecified    Alopecia    Adenoma of right adrenal gland    Pulmonary hypertension (Multi)    Polyneuropathy, unspecified    Asymptomatic postmenopausal state    Cervical dysphagia    Cystic kidney disease, unspecified    Deficiency of other specified B group vitamins    Iron deficiency anemia, unspecified    Polyp of colon    Disorder of  phosphorus metabolism, unspecified    Hypertensive heart and chronic kidney disease with heart failure and stage 1 through stage 4 chronic kidney disease, or unspecified chronic kidney disease    Fatty (change of) liver, not elsewhere classified    H/O high risk medication treatment       Past Medical History:   Diagnosis Date    Acute pulmonary embolism (Multi) 12/15/2023    Acute pulmonary embolism without acute cor pulmonale (Multi) 12/15/2023    Anxiety     Asymmetrical sensorineural hearing loss 03/09/2018    Asymmetrical left sensorineural hearing loss    Chronic deep vein thrombosis (DVT) of lower extremity (Multi) 01/25/2023    Chronic fatigue 03/24/2021    History of chronic fatigue    Chronic kidney disease     Chronic kidney disease, stage 3a (Multi) 02/19/2021    now Stage 3B CKD  Aug 2023    Clotting disorder (Multi) 5/07/2019    Depression     Fall 01/25/2023    Subsequent encounter    GI bleeding 01/25/2023    Gross hematuria 01/25/2023    Hyperlipidemia     Hypertension     Impacted cerumen, right ear 02/27/2018    Impacted cerumen of right ear    Pulmonary embolism     Sleep apnea     No CPAP    Tinnitus, bilateral 08/18/2020    Tinnitus, bilateral    Urinary tract infection 07/08/2021    Visual impairment       Past Surgical History:   Procedure Laterality Date    APPENDECTOMY  03/19/2015    Appendectomy    CARDIAC CATHETERIZATION  December 2018    HYSTERECTOMY  03/19/2015    Hysterectomy    MR HEAD ANGIO WO IV CONTRAST  02/21/2012    MR HEAD ANGIO WO IV CONTRAST [facility]    MR NECK ANGIO WO IV CONTRAST  02/21/2012    MR NECK ANGIO WO IV CONTRAST [facility]    TONSILLECTOMY  03/19/2015    Tonsillectomy      Family History   Problem Relation Name Age of Onset    Cirrhosis Mother      Stroke Father Johan         CVA    Heart failure Father Johan         Congestive    Gout Father Johan     Heart attack Father Johan         Myocardial infarction    Heart disease Father Johan      Colon cancer Sister Miguel (colon cancer)     Cancer Sister Miguel (colon cancer)     COPD Brother Jayesh     Multiple sclerosis Sister Danelle       Social History     Tobacco Use    Smoking status: Former     Current packs/day: 0.00     Average packs/day: 1 pack/day for 20.0 years (20.0 ttl pk-yrs)     Types: Cigarettes     Start date: 1970     Quit date: 1980     Years since quittin.3    Smokeless tobacco: Never    Tobacco comments:     Was a smoker for about 10 yrs   Substance Use Topics    Alcohol use: Not Currently        Current Outpatient Medications:     acetaminophen (Tylenol) 325 mg tablet, Take 2 tablets (650 mg) by mouth every 6 hours if needed., Disp: , Rfl:     apixaban (Eliquis) 5 mg tablet, Take 0.5 tablets (2.5 mg) by mouth 2 times a day., Disp: 90 tablet, Rfl: 3    artificial tears, dextran-hypomel-glycerin, 0.1-0.3-0.2 % ophthalmic solution, Administer into affected eye(s)., Disp: , Rfl:     bisoprolol (Zebeta) 5 mg tablet, TAKE 1/2 TABLET BY MOUTH DAILY, Disp: 45 tablet, Rfl: 2    cholecalciferol (Vitamin D-3) 25 MCG (1000 UT) tablet, Take 2 tablets (2,000 Units) by mouth once daily., Disp: , Rfl:     denosumab (Prolia) 60 mg/mL syringe, Prolia, Disp: , Rfl:     diclofenac sodium (Voltaren Arthritis Pain) 1 % gel, Apply 4.5 inches (4 g) topically 4 times a day as needed (pain)., Disp: 100 g, Rfl: 0    escitalopram (Lexapro) 10 mg tablet, TAKE 1 TABLET BY MOUTH EVERY DAY, Disp: 90 tablet, Rfl: 1    HAIR, SKIN AND NAILS, BIOTIN, ORAL, Take by mouth once daily., Disp: , Rfl:     ketoconazole (NIZOral) 2 % cream, , Disp: , Rfl:     lidocaine (Lidoderm) 5 % patch, Place 1 patch over 12 hours on the skin once daily. Remove & discard patch within 12 hours or as directed by MD., Disp: 12 patch, Rfl: 0    minoxidil (Rogaine) 2 % external solution, Apply topically 2 times a day., Disp: , Rfl:     nitroglycerin (Nitrostat) 0.4 mg SL tablet, Place 1 tablet (0.4 mg) under the tongue every 5 minutes  if needed for chest pain. DISSOLVE 1 TABLET UNDER THE TONGUE EVERY 5 MINUTES FOR UP TO 3 DOSES AS NEEDED FOR CHEST PAIN.CALL 911 IF PAIN PERSISTS., Disp: 90 tablet, Rfl: 1    sod sulf-pot chloride-mag sulf 1.479-0.188- 0.225 gram tablet, Take 12 tablets by mouth the day before and 12 tablets by mouth the day of colonoscopy per included instructions. Finish prep per instructions., Disp: 24 tablet, Rfl: 0    triamcinolone (Kenalog) 0.1 % cream, Apply topically 2 times a day. Apply to affected area 1-2 times daily as needed. Avoid face and groin., Disp: 30 g, Rfl: 0    atorvastatin (Lipitor) 40 mg tablet, Take 1 tablet (40 mg) by mouth once daily at bedtime., Disp: 90 tablet, Rfl: 3    lisinopril 10 mg tablet, Take 1 tablet (10 mg) by mouth once daily., Disp: 90 tablet, Rfl: 3     Review of Systems  Constitutional: Denies fever  HEENT: Denies ST, earache  CVS: Denies Chest pain  Pulmonary: Denies wheezing, SOB  GI: Denies N/V  : Denies dysuria  Musculoskeletal:  Denies myalgia  Neuro: Denies focal weakness or numbness.  Skin: Denies Rashes.  *Review of Systems is negative unless otherwise mentioned in HPI or ROS above.    Objective   /82   Pulse 60   Temp 36.7 °C (98.1 °F)   Wt 95 kg (209 lb 6.4 oz)   SpO2 98%   BMI 38.30 kg/m²  reviewed Body mass index is 38.3 kg/m².     Physical Exam  Constitutional: NAD.  Resting comfortably.  Head: Atraumatic, normocephalic.  ENT: Moist oral mucosa. Nasal mucosa wnl.   Cardiac: Regular rate & rhythm.   Pulmonary: Lungs clear bilat  GI: Soft, Nontender, nondistended.   Musculoskeletal: No peripheral edema.   Skin: Right lateral rib/abd bruising from fall 2 wk ago. No rashes  Psych: Intact judgement and insight.    .Assessment/Plan   Problem List Items Addressed This Visit             ICD-10-CM    Primary hypertension I10    Relevant Medications    lisinopril 10 mg tablet    Dyslipidemia E78.5    Relevant Medications    atorvastatin (Lipitor) 40 mg tablet     Other Visit  Diagnoses         Codes    Ataxia    -  Primary R27.0    Relevant Orders    Referral to Physical Therapy

## 2024-10-08 ENCOUNTER — APPOINTMENT (OUTPATIENT)
Dept: PRIMARY CARE | Facility: CLINIC | Age: 74
End: 2024-10-08
Payer: MEDICARE

## 2024-10-08 VITALS
SYSTOLIC BLOOD PRESSURE: 120 MMHG | WEIGHT: 209.4 LBS | HEART RATE: 60 BPM | OXYGEN SATURATION: 98 % | BODY MASS INDEX: 38.3 KG/M2 | TEMPERATURE: 98.1 F | DIASTOLIC BLOOD PRESSURE: 82 MMHG

## 2024-10-08 DIAGNOSIS — E78.5 DYSLIPIDEMIA: ICD-10-CM

## 2024-10-08 DIAGNOSIS — R27.0 ATAXIA: Primary | ICD-10-CM

## 2024-10-08 DIAGNOSIS — I10 PRIMARY HYPERTENSION: ICD-10-CM

## 2024-10-08 PROCEDURE — 99214 OFFICE O/P EST MOD 30 MIN: CPT | Performed by: PHYSICIAN ASSISTANT

## 2024-10-08 PROCEDURE — 1160F RVW MEDS BY RX/DR IN RCRD: CPT | Performed by: PHYSICIAN ASSISTANT

## 2024-10-08 PROCEDURE — 3079F DIAST BP 80-89 MM HG: CPT | Performed by: PHYSICIAN ASSISTANT

## 2024-10-08 PROCEDURE — 3074F SYST BP LT 130 MM HG: CPT | Performed by: PHYSICIAN ASSISTANT

## 2024-10-08 PROCEDURE — 1036F TOBACCO NON-USER: CPT | Performed by: PHYSICIAN ASSISTANT

## 2024-10-08 PROCEDURE — 1159F MED LIST DOCD IN RCRD: CPT | Performed by: PHYSICIAN ASSISTANT

## 2024-10-08 RX ORDER — ATORVASTATIN CALCIUM 40 MG/1
40 TABLET, FILM COATED ORAL NIGHTLY
Qty: 90 TABLET | Refills: 3 | Status: SHIPPED | OUTPATIENT
Start: 2024-10-08 | End: 2025-10-08

## 2024-10-08 RX ORDER — LISINOPRIL 10 MG/1
10 TABLET ORAL DAILY
Qty: 90 TABLET | Refills: 3 | Status: SHIPPED | OUTPATIENT
Start: 2024-10-08 | End: 2025-10-08

## 2024-10-08 ASSESSMENT — ENCOUNTER SYMPTOMS
DEPRESSION: 1
LOSS OF SENSATION IN FEET: 0
OCCASIONAL FEELINGS OF UNSTEADINESS: 1

## 2024-10-10 ENCOUNTER — DOCUMENTATION (OUTPATIENT)
Dept: PREADMISSION TESTING | Facility: HOSPITAL | Age: 74
End: 2024-10-10
Payer: MEDICARE

## 2024-10-10 NOTE — NURSING NOTE
Left message at patient's phone # [phone] and notified patient that she can hold Eliquis three days prior to procedure. If procedure is on 10/21 her last day to take will be on 10/17. Asked patient to call back and confirm that she understood and received message regarding Eliquis.

## 2024-10-10 NOTE — NURSING NOTE
Patient returned phone call and relayed message of holding apixaban for 3 days prior to procedure. Patient stated she understands

## 2024-10-21 ENCOUNTER — ANESTHESIA (OUTPATIENT)
Dept: GASTROENTEROLOGY | Facility: HOSPITAL | Age: 74
End: 2024-10-21

## 2024-10-21 ENCOUNTER — APPOINTMENT (OUTPATIENT)
Dept: GASTROENTEROLOGY | Facility: HOSPITAL | Age: 74
End: 2024-10-21
Payer: MEDICARE

## 2024-10-22 ENCOUNTER — HOSPITAL ENCOUNTER (OUTPATIENT)
Dept: RADIOLOGY | Facility: HOSPITAL | Age: 74
Discharge: HOME | End: 2024-10-22
Payer: MEDICARE

## 2024-10-22 VITALS — BODY MASS INDEX: 38.64 KG/M2 | HEIGHT: 62 IN | WEIGHT: 210 LBS

## 2024-10-22 DIAGNOSIS — Z12.31 SCREENING MAMMOGRAM FOR BREAST CANCER: ICD-10-CM

## 2024-10-22 PROCEDURE — 77063 BREAST TOMOSYNTHESIS BI: CPT | Performed by: RADIOLOGY

## 2024-10-22 PROCEDURE — 77067 SCR MAMMO BI INCL CAD: CPT | Performed by: RADIOLOGY

## 2024-10-22 PROCEDURE — 77067 SCR MAMMO BI INCL CAD: CPT

## 2024-10-30 DIAGNOSIS — Z86.711 HISTORY OF PULMONARY EMBOLISM: Primary | ICD-10-CM

## 2024-11-01 ENCOUNTER — APPOINTMENT (OUTPATIENT)
Dept: PHYSICAL THERAPY | Facility: HOSPITAL | Age: 74
End: 2024-11-01
Payer: MEDICARE

## 2024-11-04 ENCOUNTER — APPOINTMENT (OUTPATIENT)
Dept: PHARMACY | Facility: HOSPITAL | Age: 74
End: 2024-11-04
Payer: MEDICARE

## 2024-11-04 DIAGNOSIS — Z86.711 HISTORY OF PULMONARY EMBOLISM: ICD-10-CM

## 2024-11-04 NOTE — PROGRESS NOTES
Clinical Pharmacy Appointment    Patient ID: Soni Cleveland is a 74 y.o. female who presents for Follow Up appointment.     Referring Provider: Vanessa Dsouza PA-C  PCP: Vanessa Dsouza PA-C   Last visit with PCP: 10/8/24   Next visit with PCP: 2/17/25    Subjective   ANTICOAGULATION  Does patient follow with cardiology? Yes    Date: 8/8/2025     Anticoagulation Indication: prevention of venous thromboembolism   Patient is projected to be on anticoagulation indefinitely.      Current anticoagulants include:  Apixaban 2.5 mg BID   Unprovoked B/L PE in July 2021  Heme/onc recommends lifelong anticoagulation with 2.5 mg BID    Adverse Effects: No significant side effects; some bruising.      Monitoring:  Date of last BMP: 10/1/24  Recent Hospitalizations: None   Recent Falls/Trauma: Yes, 9/19/24. Tripped on rug and hit face on floor. Presented to ED for evaluation. Rib and nose fracture. All bruising healed at this time.    Changes in Tobacco or Alcohol Intake: None      Medication Reconciliation:  No changes     Drug Interactions  No relevant drug interactions were noted.     Medication System Management  Patient's preferred pharmacy: [facility]   Adherence/Organization: No issues - using Eliquis 5 mg tablets and cutting in half (3 weeks remaining of current supply)  Affordability/Accessibility: Eliquis - around $100 per month  Screened for  PAP   Income exceed 400% FPL       Objective   Allergies   Allergen Reactions    Penicillin Hives and Rash     Prescribed ceftriaxone in July 2021     Social History     Social History Narrative    Not on file      Medication Review  Current Outpatient Medications   Medication Instructions    acetaminophen (TYLENOL) 650 mg, oral, Every 6 hours PRN    apixaban (ELIQUIS) 2.5 mg, oral, 2 times daily    artificial tears, dextran-hypomel-glycerin, 0.1-0.3-0.2 % ophthalmic solution ophthalmic (eye)    atorvastatin (LIPITOR) 40 mg, oral, Nightly    bisoprolol (ZEBETA) 2.5  mg, oral, Daily    cholecalciferol (VITAMIN D-3) 2,000 Units, oral, Daily RT    denosumab (Prolia) 60 mg/mL syringe Prolia    diclofenac sodium (VOLTAREN ARTHRITIS PAIN) 4 g, Topical, 4 times daily PRN    escitalopram (LEXAPRO) 10 mg, oral, Daily    HAIR, SKIN AND NAILS, BIOTIN, ORAL oral, Daily RT    ketoconazole (NIZOral) 2 % cream     lidocaine (Lidoderm) 5 % patch 1 patch, transdermal, Daily, Remove & discard patch within 12 hours or as directed by MD.    lisinopril 10 mg, oral, Daily    minoxidil (Rogaine) 2 % external solution Topical, 2 times daily    nitroglycerin (NITROSTAT) 0.4 mg, sublingual, Every 5 min PRN, DISSOLVE 1 TABLET UNDER THE TONGUE EVERY 5 MINUTES FOR UP TO 3 DOSES AS NEEDED FOR CHEST PAIN.CALL 911 IF PAIN PERSISTS.    sod sulf-pot chloride-mag sulf 1.479-0.188- 0.225 gram tablet Take 12 tablets by mouth the day before and 12 tablets by mouth the day of colonoscopy per included instructions. Finish prep per instructions.    triamcinolone (Kenalog) 0.1 % cream Topical, 2 times daily, Apply to affected area 1-2 times daily as needed. Avoid face and groin.      Vitals  BP Readings from Last 2 Encounters:   10/08/24 120/82   10/01/24 112/58     BMI Readings from Last 1 Encounters:   10/22/24 38.41 kg/m²      Labs  A1C  Lab Results   Component Value Date    HGBA1C 5.3 08/22/2023    HGBA1C 6.0 07/10/2021    HGBA1C 5.8 01/14/2019     BMP  Lab Results   Component Value Date    CALCIUM 10.1 10/01/2024     10/01/2024    K 3.9 10/01/2024    CO2 27 10/01/2024     10/01/2024    BUN 24 (H) 10/01/2024    CREATININE 1.51 (H) 10/01/2024    EGFR 37 (L) 10/01/2024     LFTs  Lab Results   Component Value Date    ALT 22 10/01/2024    AST 23 10/01/2024    ALKPHOS 112 10/01/2024    BILITOT 0.4 10/01/2024     FLP  Lab Results   Component Value Date    TRIG 175 (H) 06/01/2024    CHOL 184 06/01/2024    LDLF 76 03/18/2023    LDLCALC 88 06/01/2024    HDL 60.9 06/01/2024     Urine Microalbumin  Lab Results    Component Value Date    MICROALBCREA 9.8 03/18/2023     Weight Management  Wt Readings from Last 3 Encounters:   10/22/24 95.3 kg (210 lb)   10/08/24 95 kg (209 lb 6.4 oz)   10/02/24 95.3 kg (210 lb)      There is no height or weight on file to calculate BMI.     Assessment/Plan   Problem List Items Addressed This Visit       History of pulmonary embolism     Rationale for plan: Patient is to be on anticoagulation indefinitely. At this time, patient denies any side effects with current anticoagulation regimen. Does endorse recent fall but was evaluated in ED and reports symptoms/pain are improving. Reports primary concern is related to copay associated with Eliquis therapy. Patient is NOT enrolled in  PAP for Eliquis. Screening completed and review of financials indicates that patient's income exceeds limit. Discussed  programs including Eliquis 360. Advised patient to complete online consent form for screening of  programs. Will continue to follow.     Medication Changes: Switch from 5 mg to 2.5 mg tablets with PAP.   CONTINUE Eliquis 2.5 mg BID      Monitoring and Education Discussed:  Counseled patient of side effects that are indicative of bleeding such as dark tarry stool, unexplainable bruising, or vomiting up a coffee ground like substance  Reminded patient of importance of anticoagulation therapy to prevent risk of heart attack or stroke  Counseled patient on MOA, expectations, duration of therapy, contraindications, administration, and monitoring parameters   Answered all patient questions and concerns    Clinical Pharmacist follow-up: as needed for  copay program enrollment, Telehealth visit    Continue all meds under the continuation of care with the referring provider and clinical pharmacy team.    Thank you,  Maite Jane, PharmD  Clinical Pharmacist  [phone]    Verbal consent to manage patient's drug therapy was obtained from the patient. They were  informed they may decline to participate or withdraw from participation in pharmacy services at any time.

## 2024-11-07 ENCOUNTER — EVALUATION (OUTPATIENT)
Dept: PHYSICAL THERAPY | Facility: HOSPITAL | Age: 74
End: 2024-11-07
Payer: MEDICARE

## 2024-11-07 DIAGNOSIS — R27.0 ATAXIA: Primary | ICD-10-CM

## 2024-11-07 PROCEDURE — 97110 THERAPEUTIC EXERCISES: CPT | Mod: GP | Performed by: PHYSICAL THERAPIST

## 2024-11-07 PROCEDURE — 97161 PT EVAL LOW COMPLEX 20 MIN: CPT | Mod: GP | Performed by: PHYSICAL THERAPIST

## 2024-11-07 ASSESSMENT — ENCOUNTER SYMPTOMS
LOSS OF SENSATION IN FEET: 0
DEPRESSION: 1
OCCASIONAL FEELINGS OF UNSTEADINESS: 1

## 2024-11-07 ASSESSMENT — PAIN SCALES - GENERAL: PAINLEVEL_OUTOF10: 0 - NO PAIN

## 2024-11-07 ASSESSMENT — PAIN - FUNCTIONAL ASSESSMENT: PAIN_FUNCTIONAL_ASSESSMENT: 0-10

## 2024-11-07 NOTE — PROGRESS NOTES
Physical Therapy  Physical Therapy Evaluation and Treatment    Patient Name: Soni Cleveland  MRN: [id number]  Today's Date: 11/7/2024  Time Calculation  Start Time: 1432  Stop Time: 1513  Time Calculation (min): 41 min    Today's Charges  PT Evaluation Time Entry  PT Evaluation (Low) Time Entry: 30  PT Therapeutic Procedures Time Entry  Therapeutic Exercise Time Entry: 11     Insurance:  Visit number: 1 of 5  Authorization info: no auth required  Payor: MEDICARE / Plan: MEDICARE PART A AND B / Product Type: *No Product type* /     Current Problem  1. Ataxia  Referral to Physical Therapy    Follow Up In Physical Therapy        Problem List Items Addressed This Visit             ICD-10-CM    Ataxia - Primary R27.0    Relevant Orders    Follow Up In Physical Therapy       General:  General  Reason for Referral: balance/gait impairment  Referred By: Meet HERNANDEZ  Past Medical History Relevant to Rehab: diastolic heart failure, pulmonary hypertension, PE, DVT, vertigo, stage 3b CKD, depression with anxiety, chronic left sided low back pain with left sided sciatica, chronic fatigue, gait abnormality.      Precautions:   Precautions  STEADI Fall Risk Score (The score of 4 or more indicates an increased risk of falling): 9  Medical Precautions: Fall precautions    Medical History Form: Reviewed (scanned into chart)    Subjective:   Subjective   Chief Complaint: Patient is a 74 year old female who presents to clinic status post fall. Patient states that she was hurrying into the kitchen and the throw rug slipped causing her to fall down. Patient has a prior medical history including: diastolic heart failure, pulmonary hypertension, PE, DVT, vertigo, stage 3b CKD, depression with anxiety, chronic left sided low back pain with left sided sciatica, chronic fatigue, gait abnormality.  Onset Date: 10/8/2024  KARLO: Fall    Current Condition:   Worse    Pain:  Pain Assessment: 0-10  0-10 (Numeric) Pain Score: 0 - No pain  Highest:  "8/10 pain  Lowest: 0/10 pain  Aggravating Factors:  turning the \"wrong way\"  Relieving Factors:  Heat and pain medication    Relevant Information (PMH & Previous Tests/Imaging):   Rib xray 9/25/2024:  1.  No evidence of acute cardiopulmonary process. Bibasilar atelectasis. No acute right rib fracture.    Previous Interventions/Treatments: None    Prior Level of Function (PLOF)  Patient previously independent with all ADLs  Exercise/Physical Activity: none  Work/School: retired    Hand Dominance: right     Patients Living Environment: Reviewed and no concern    Primary Language: English    Patient's Goal(s) for Therapy: Improve her balance.    Red Flags: Do you have any of the following? No  Fever/chills, unexplained weight changes, dizziness/fainting, unexplained change in bowel or bladder functions, unexplained malaise or muscle weakness, night pain/sweats, numbness or tingling    Objective:  Objective     Hip PROM  Hip PROM WFL: yes  Specific Lower Extremity MMT  R Iliopsoas: (5/5): 4/5  L Iliopsoas: (5/5): 4/5  R Gluteals (sidelying): (5/5): 4/5  L Gluteals (sidelying): (5/5): 4/5    Flexibility  R hamstrings: WFL  L hamstrings: WFL    Knee AROM  Knee AROM WFL: yes    Knee MMT  R knee flexion: (5/5): 4+/5  L knee flexion: (5/5): 4+/5  R knee extension: (5/5): 4+/5  L knee extension: (5/5): 4+/5    Lower Extremity Functional Movements  Transfers: Independent  Gait: ataxic  Assistive Device no device  SL Balance: not tested   DL Squat: able to perform without use of bilateral upper extremities   Feet Together: 30 seconds  Tandem: not tested     Outcome Measures:  Other Measures  5x Sit to Stand: 11.45 seconds  Other Outcome Measures: AM-PAC short form:26     Treatment Performed:   Therapeutic Exercise  Therapeutic Exercise Performed: Yes  Therapeutic Exercise Activity 1: bridge x5  Therapeutic Exercise Activity 2: SLR x5  Therapeutic Exercise Activity 3: h/l hip add x5  Therapeutic Exercise Activity 4: LAQ " x5  Therapeutic Exercise Activity 5: standing hip abd x5    EDUCATION:   Individual(s) Educated: patient   Education Provided: Home exercise program, plan of care, activity modifications, pain management, and injury pathology  Handout(s) Provided: Scanned into chart  Home Program: Access Code: [email or web address]  Risk and Benefits Discussed with Patient/Caregiver/Other: Yes   Patient/Caregiver Demonstrated Understanding: Yes   Plan of Care Discussed and Agreed Upon: Yes   Patient Response to Education: Patient/Caregiver verbalized understanding of information, Patient/Caregiver performed return demonstration of exercises/activities, and Patient/Caregiver asked appropriate questions    Assessment: Patient presents with impaired strength and balance resulting in limited participation in pain-free ADLs and inability to perform at their prior level of function. Pt would benefit from physical therapy to address the impairments found & listed previously in the objective section in order to return to safe and pain-free ADLs and prior level of function.  PT Assessment Results: Decreased strength, Decreased range of motion, Impaired balance, Decreased mobility, Obesity, Pain  Rehab Prognosis: Good  Evaluation/Treatment Tolerance: Patient tolerated treatment well    Complexity: low    Plan:  Treatment/Interventions: Education/ Instruction, Gait training, Neuromuscular re-education, Therapeutic activities, Therapeutic exercises  PT Plan: Skilled PT  PT Frequency: 1 time per week  Duration: 5 weeks  Onset Date: 10/08/24  Certification Period Start Date: 11/07/24  Certification Period End Date: 12/12/24  Number of Treatments Authorized: 1 of 5  Rehab Potential: Good  Plan of Care Agreement: Patient  Planned Interventions include: therapeutic exercise, self-care home management, manual therapy, therapeutic activities, gait training, neuromuscular coordination, vasopneumatic, dry needling, aquatic therapy    Goals: Set and discussed  today  Active       PT Problem       Patient will maintain Tandem Stance for at least 10 sec with no upper extremity support.       Start:  11/07/24    Expected End:  12/12/24            Patient will ambulate 1 mile distance using no device with independent without needing to sit and rest.       Start:  11/07/24    Expected End:  12/12/24            Patient will ascend and descend 1 flight of stairs with rail modified independent and reciprocal pattern.       Start:  11/07/24    Expected End:  12/12/24            Patient will achieve bilateral hip flexion strength of at least 4+/5       Start:  11/07/24    Expected End:  12/12/24            Patient will achieve bilateral hip abduction strength of at least 4+/5       Start:  11/07/24    Expected End:  12/12/24            Patient will demonstrate independence in home program for support of progression       Start:  11/07/24    Expected End:  12/12/24            Patient will report pain of no more than 2/10 demonstrating a reduction of overall pain       Start:  11/07/24    Expected End:  12/12/24            Patient will show a significant change in AM-PAC (26 to 36) patient reported outcome tool to demonstrate subjective imporovement       Start:  11/07/24    Expected End:  12/12/24                Ambulatory Screenings Summary       Screening  Frequency  Date Last Completed   Spiritual and Cultural Beliefs   Screening  each visit or episode of care 7/18/2024   Falls Risk Screening  every ambulatory visit 11/7/2024    Pain Screening  annually at primary care visit  7/18/2024   Domestic Violence screening  annually at primary care visit 1/8/2024   Elder Abuse Screening  annually at primary care visit 7/18/2024   Depression Screening  annually in the primary care setting 7/30/2024   Suicide Risk Screening  annually in the primary care setting 10/1/2024   Nutrition and Food Insecurity   Screening  at least annually at primary care visit     Key Learner  annually in the  primary care setting 7/18/2024   Drug Screen  9/25/2024  2:20 AM   Alcohol Screen  9/25/2024  2:20 AM   Advance Directive  7/30/2024       Plan of care was developed with input and agreement by the patient    Avtar Schwab, PT

## 2024-11-08 ENCOUNTER — APPOINTMENT (OUTPATIENT)
Dept: OTOLARYNGOLOGY | Facility: CLINIC | Age: 74
End: 2024-11-08
Payer: MEDICARE

## 2024-11-08 DIAGNOSIS — J34.2 DEVIATED NASAL SEPTUM: Primary | ICD-10-CM

## 2024-11-08 DIAGNOSIS — S02.2XXA CLOSED FRACTURE OF NASAL BONE, INITIAL ENCOUNTER: ICD-10-CM

## 2024-11-08 DIAGNOSIS — J34.89 CONCHA BULLOSA: ICD-10-CM

## 2024-11-08 PROCEDURE — 1159F MED LIST DOCD IN RCRD: CPT | Performed by: OTOLARYNGOLOGY

## 2024-11-08 PROCEDURE — 99213 OFFICE O/P EST LOW 20 MIN: CPT | Performed by: OTOLARYNGOLOGY

## 2024-11-08 NOTE — PROGRESS NOTES
Chief Complaint   nasal trauma     History of Present Illness    11.08.2024 On September 19, 2024, she fell at home and hit her nose hard on the ground. A CT scan revealed minor fractures at the tip of her nose. I evaluated her CT scan, nasal septum is deviated to let and there is right alexandra bullosa. However, she is breathing well through her nose and is satisfied with its appearance. My impression is that surgery is not indicated at this time.    Plan  Follow-up as needed.        Interpreted By: Jennifer Theodore,  STUDY:  CT FACIAL BONES WO IV CONTRAST 9/19/2024 4:26 pm  INDICATION:  Signs/Symptoms:Fall    COMPARISON:  None.  ACCESSION NUMBER(S):  [id number]  ORDERING CLINICIAN:  ABDI ALONSO  TECHNIQUE:  Thin cut axial CT images through the facial bones were obtained and  reconstructed in the coronal and sagittal plane. 3d reconstructions  were generated at a separate workstation.  FINDINGS:  Orbits: The bony orbits are intact. The orbital contents are  unremarkable.  Facial Bones: Comminuted fracture of the nasal bones is noted with  associated soft tissue air. Right-sided nasal fragments are depressed  to a maximum of about 3 mm. There is a about 1 mm leftward  displacement of left nasal bone fracture fragment.  Mandible/Temporomandibular Joints: Visualized portions of mandible  and bilateral temporomandibular joints are intact. Streak artifact is  present from dental metal.  Paranasal Sinuses/Mastoids: A posterior right ethmoid air cell is  opacified. Remainder of the paranasal sinuses are clear. There is  alexandra bullosa on the right. Nasal septum is deviated to the left.  Soft tissues: Soft tissue swelling is present over the nasal fracture.    IMPRESSION:  Comminuted fracture of the nasal bones with right-sided fracture  fragments depressed maximum of 3 mm. About 1 mm leftward shift of the  left nasal bone fracture fragment.  MACRO:  None  Signed by: Jennifer Theodore 9/19/2024 4:41 PM  Dictation  workstation: [id number]  ______________________________________________________________________    01.05.2023: She had her hearing test done.  It shows bilateral presbycusis.  Left ear is worse than the right ear at 6 and 8 kHz, 15 dB difference.    Recommendations:  1-follow-up in 1 year with hearing test  2-consider using hearing aids  _________________________________________________________________    11.03.2023: She has decreased hearing bilaterally, more on the left side. She has severely annoying tinnitus.     Plan:  1- Hearing test  2- information regarding lenire was provided    _________________________________________________________________    05.05.2023: Tinnitus makes her crazy at night. During the day she can manage it. It is like cricket sounds.  TMs look intact.     Recommendations:  1- hearing test  2- consider lenire device     ____________________________________________________________________________________________        03.08.2021: Ms. Cleveland is a 69 yo F. She is referred by Dr. John for tinnitus in ears. It has been going on for years.   Secondly, she has itching in her ear canals.     ____________________________________________________________________________________________     03.24.2021: She has bilateral presbyacusis and tinnitus. She had her hearing test today. Compared to her hearing test which was done ~3 years ago, there is not much progress in her hearing loss. It was explained to her that tinnitus is a benign condition and may happen with age related hearing loss. Stress and focusing on tinnitus can make it psychologically worse. Some of her current medications may have caused or may have increased her tinnitus (aspirin, lisonopril and/or atorvastatin). I recommend to ask Dr. John if it is possible to stop/pause some of these medications for some time or not.      Recommendations:  1- avoid focusing on your tinnitus  2- follow up in 3 years       Physical  Exam  (Old exam)  General appearance: Healthy-appearing, well-nourished, well groomed, in no acute distress.      Head and Face: Atraumatic with no masses, lesions, or scarring.      Facial strength: Normal strength and symmetry, no synkinesis or facial tic.      Eyes: Conjunctivas look non-hyperemic bilaterally     Ears: Ear canals look normal. Mild wax bilaterally. Tympanic membranes look intact, no hyperemia, fluid or retraction. Mild sclerosis at left posteroinferiorly. She may have had effusion when she was a little kid.     Nose: No purulent discharge. (previous exam)     Throat: No tonsil hypertrophy. No hyperemia. (previous exam)     Pulmonary: Normal respiratory effort.      Lymphatic No palpable pathologic lymph nodes at neck. (previous exam)     Neurological/Psychiatric Orientation to person, place, and time: Normal.   Mood and affect: Normal.      Extremities: No clubbing.      Skin: No skin lesions were noted at face or neck      Procedure  03.24.2021: Hearing Test  Appointment time: 9:10-9:40     Otoscopy revealed clear ear canals with visual inspection of the tympanic membranes bilaterally.     Behavioral hearing evaluation revealed slight asymmetry with the left ear being worse:  Right ear - normal hearing sensitivity 250-2000 Hz sloping to mild sensorineural hearing loss  Left ear - normal hearing sensitivity 250-2000 Hz sloping to moderate sensorineural hearing loss  *slight decrease bilaterally 8008-1958 Hz ince 2018     Speech reception thresholds obtained at a level consistent with pure tone thresholds bilaterally.     Word discrimination excellent bilaterally.     Tympanometry revealed:  Right ear - Type A, normal middle ear function  Left ear - Type Ad, eardrum hypermobility with normal ear canal volume and middle ear pressure     Ipsilateral/contralateral acoustic reflexes present 500-4000 Hz bilaterally.        Recommendations:  1) Re-evaluate hearing annually, or sooner, if a change in  hearing is noted  2) Hearing aids for both ears if struggling to hear  3) Use of fan or soothing noise during times of quiet (bed time) to help mask tinnitus     03.07.2018: Hearing test     Otoscopy revealed clear ear canals with visual inspection of the tympanic membranes bilaterally.     Behavioral hearing evaluation revealed slight asymmetry with the left ear being worse:  Right ear - normal hearing sensitivity 250-2000 Hz sloping to a mild high frequency sensorineural hearing loss  Left ear - normal hearing sensitivity 250-2000 Hz sloping to a moderate high frequency sensorineural hearing loss     Speech reception thresholds obtained at a level consistent with pure tone thresholds bilaterally.     Word discrimination excellent bilaterally.     Tympanometry revealed normal middle ear function bilaterally.     Ipsilateral acoustic reflexes present 500-4000 Hz bilaterally.    Diagnoses/Problems     · Tinnitus of both ears (388.30) (H93.13)     Assessment and Plan:     11.08.2024 On September 19, 2024, she fell at home and hit her nose hard on the ground. A CT scan revealed minor fractures at the tip of her nose. I evaluated her CT scan, nasal septum is deviated to let and there is right alexandra bullosa. However, she is breathing well through her nose and is satisfied with its appearance. My impression is that surgery is not indicated at this time.    Plan  Follow-up as needed.  _________________________________________________________________    01.05.2023: She had her hearing test done.  It shows bilateral presbycusis.  Left ear is worse than the right ear at 6 and 8 kHz, 15 dB difference.    Recommendations:  1-follow-up in 1 year with hearing test  2-consider using hearing aids  _________________________________________________________________    11.03.2023: She has decreased hearing bilaterally, more on the left side. She has severely annoying tinnitus.     Plan:  1- Hearing test  2- information regarding  lenire was provided    _________________________________________________________________    05.05.2023: Tinnitus makes her crazy at night. During the day she can manage it. It is like cricket sounds.  TMs look intact.     Recommendations:  1- hearing test  2- consider lenire device     ____________________________________________________________________________________________     03.24.2021: She has bilateral presbyacusis and tinnitus. She had her hearing test today. Compared to her hearing test which was done ~3 years ago, there is not much progress in her hearing loss. It was explained to her that tinnitus is a benign condition and may happen with age related hearing loss. Stress and focusing on tinnitus can make it psychologically worse. Some of her current medications may have caused or may have increased her tinnitus (aspirin, lisonopril and/or atorvastatin). I recommend to ask Dr. John if it is possible to stop/pause some of these medications for some time or not.      Recommendations:  1- avoid focusing on your tinnitus  2- follow up in 3 years

## 2024-11-14 ENCOUNTER — APPOINTMENT (OUTPATIENT)
Dept: PHYSICAL THERAPY | Facility: HOSPITAL | Age: 74
End: 2024-11-14
Payer: MEDICARE

## 2024-12-10 ENCOUNTER — TREATMENT (OUTPATIENT)
Dept: PHYSICAL THERAPY | Facility: HOSPITAL | Age: 74
End: 2024-12-10
Payer: MEDICARE

## 2024-12-10 DIAGNOSIS — R27.0 ATAXIA: ICD-10-CM

## 2024-12-10 PROCEDURE — 97110 THERAPEUTIC EXERCISES: CPT | Mod: GP,CQ

## 2024-12-10 PROCEDURE — 97112 NEUROMUSCULAR REEDUCATION: CPT | Mod: GP,CQ

## 2024-12-10 NOTE — PROGRESS NOTES
"  Physical Therapy Treatment    Patient Name: Soni Cleveland  MRN: [id number]  Today's Date: 12/10/2024  Time Calculation  Start Time: 1303  Stop Time: 1342  Time Calculation (min): 39 min        PT Therapeutic Procedures Time Entry  Neuromuscular Re-Education Time Entry: 23  Therapeutic Exercise Time Entry: 16                Current Problem  1. Ataxia  Follow Up In Physical Therapy          General  Reason for Referral: balance/gait impairment  Referred By: Meet HERNANDEZ  Past Medical History Relevant to Rehab: diastolic heart failure, pulmonary hypertension, PE, DVT, vertigo, stage 3b CKD, depression with anxiety, chronic left sided low back pain with left sided sciatica, chronic fatigue, gait abnormality.    Subjective   Current Condition:   Same  Patient reports she has not been able to perform the exercises since her last visit, busy with the holiday season.     Performing HEP?: No    Precautions  Precautions  Medical Precautions: Fall precautions  Pain       Objective         Treatments:    Therapeutic Exercise  Therapeutic Exercise Performed: Yes  Therapeutic Exercise Activity 5: STS 22\" x10, 21\" x10  Therapeutic Exercise Activity 6: Standing hip ABD Yellow x10  Therapeutic Exercise Activity 7: Standing Hip Extension Yellow x10 R/L  Therapeutic Exercise Activity 8: HR x20    Balance/Neuromuscular Re-Education  Balance/Neuromuscular Re-Education Activity Performed: Yes  Balance/Neuromuscular Re-Education Activity 1: side step Yellow 6'x4  Balance/Neuromuscular Re-Education Activity 2: 1/2 tandem 45\" R/L  Balance/Neuromuscular Re-Education Activity 3: Romberg EC 30\"  Balance/Neuromuscular Re-Education Activity 4: Romberg  EC with head turns 30\"  Balance/Neuromuscular Re-Education Activity 5: Tandem walk fwd/bkwd 6'x4  Balance/Neuromuscular Re-Education Activity 6: Step and reach x10 R/L  Balance/Neuromuscular Re-Education Activity 7: Lateral step and reach x10 R/L        EDUCATION:   Individual(s) Educated: " Patient  Education Provided: Updated HEP   Risk and Benefits Discussed with Patient/Caregiver/Other: Yes   Patient/Caregiver Demonstrated Understanding: Yes   Patient Response to Education: Patient/Caregiver verbalized understanding of information and Patient/Caregiver performed return demonstration of exercises/activities    Assessment: Pt was challenged with balance activities performed today and felt she would be able to perform them safely at home. Pt required cuing for correct technique and pace with standing hip exercises, with good follow through demonstrated.   PT Assessment  PT Assessment Results: Decreased strength, Decreased range of motion, Impaired balance, Decreased mobility, Obesity, Pain  Rehab Prognosis: Good    Plan: Continue to progress current POC as tolerated to facilitate ability to perform functional activities.   OP PT Plan  Treatment/Interventions: Education/ Instruction, Gait training, Neuromuscular re-education, Therapeutic activities, Therapeutic exercises  PT Plan: Skilled PT  PT Frequency: 1 time per week  Duration: 5 weeks  Onset Date: 10/08/24  Certification Period Start Date: 11/07/24  Certification Period End Date: 12/12/24  Number of Treatments Authorized: 2 of 5  Rehab Potential: Good  Plan of Care Agreement: Patient    Goals:  Active       PT Problem       Patient will maintain Tandem Stance for at least 10 sec with no upper extremity support.       Start:  11/07/24    Expected End:  12/12/24            Patient will ambulate 1 mile distance using no device with independent without needing to sit and rest.       Start:  11/07/24    Expected End:  12/12/24            Patient will ascend and descend 1 flight of stairs with rail modified independent and reciprocal pattern.       Start:  11/07/24    Expected End:  12/12/24            Patient will achieve bilateral hip flexion strength of at least 4+/5       Start:  11/07/24    Expected End:  12/12/24            Patient will achieve  bilateral hip abduction strength of at least 4+/5       Start:  11/07/24    Expected End:  12/12/24            Patient will demonstrate independence in home program for support of progression       Start:  11/07/24    Expected End:  12/12/24            Patient will report pain of no more than 2/10 demonstrating a reduction of overall pain       Start:  11/07/24    Expected End:  12/12/24            Patient will show a significant change in AM-PAC (26 to 36) patient reported outcome tool to demonstrate subjective imporovement       Start:  11/07/24    Expected End:  12/12/24                 Diogenes Hurley PTA

## 2024-12-13 DIAGNOSIS — Z86.711 HISTORY OF PULMONARY EMBOLISM: Primary | ICD-10-CM

## 2024-12-17 ENCOUNTER — OFFICE VISIT (OUTPATIENT)
Dept: RHEUMATOLOGY | Facility: CLINIC | Age: 74
End: 2024-12-17
Payer: MEDICARE

## 2024-12-17 VITALS — BODY MASS INDEX: 37.31 KG/M2 | SYSTOLIC BLOOD PRESSURE: 118 MMHG | DIASTOLIC BLOOD PRESSURE: 68 MMHG | WEIGHT: 204 LBS

## 2024-12-17 DIAGNOSIS — M81.0 AGE-RELATED OSTEOPOROSIS WITHOUT CURRENT PATHOLOGICAL FRACTURE: Primary | ICD-10-CM

## 2024-12-17 DIAGNOSIS — R76.8 SS-A ANTIBODY POSITIVE: ICD-10-CM

## 2024-12-17 DIAGNOSIS — R76.8 ANA POSITIVE: Primary | ICD-10-CM

## 2024-12-17 PROCEDURE — 99214 OFFICE O/P EST MOD 30 MIN: CPT | Performed by: INTERNAL MEDICINE

## 2024-12-17 PROCEDURE — 3078F DIAST BP <80 MM HG: CPT | Performed by: INTERNAL MEDICINE

## 2024-12-17 PROCEDURE — 3074F SYST BP LT 130 MM HG: CPT | Performed by: INTERNAL MEDICINE

## 2024-12-17 PROCEDURE — 1159F MED LIST DOCD IN RCRD: CPT | Performed by: INTERNAL MEDICINE

## 2024-12-17 NOTE — PROGRESS NOTES
"Recheck  + RAVI  /  + SSA   doing well overall     HPI - she slipped on a throw rug, fell, and broke her nose but doing ok now.  She has \"normal aches and pains\" Her R hand is stiffer than her L.  No swelling.  AM stiffness hand, brief.  No CP.  Some BATISTA with steps.  Occ cough.  No GI.  No rash, mouth sores, numbness/tingling, or raynauds.  Intermittent dry throat.    PE  NAD  Moist MM good salivary pooling  RRR no r/m/g  CTA  No edema  No synovitis    A/P - +RAVI and +SSA, checked because of decades of alopecia.  No other partic s/s of CTD  OP - primary treating   Reviewed CMP and CBC from primary - CR[facility] - she has seen nephrol, but he has left.  Offered to refer, but she will d/w primary  Follow up 1 yr or sooner PRN  "

## 2024-12-19 ENCOUNTER — APPOINTMENT (OUTPATIENT)
Dept: PHYSICAL THERAPY | Facility: HOSPITAL | Age: 74
End: 2024-12-19
Payer: MEDICARE

## 2024-12-27 ENCOUNTER — APPOINTMENT (OUTPATIENT)
Dept: PHYSICAL THERAPY | Facility: HOSPITAL | Age: 74
End: 2024-12-27
Payer: MEDICARE

## 2024-12-27 DIAGNOSIS — R27.0 ATAXIA: ICD-10-CM

## 2024-12-27 PROCEDURE — 97110 THERAPEUTIC EXERCISES: CPT | Mod: GP | Performed by: PHYSICAL THERAPIST

## 2024-12-27 ASSESSMENT — PAIN DESCRIPTION - DESCRIPTORS: DESCRIPTORS: ACHING;DULL

## 2024-12-27 ASSESSMENT — PAIN - FUNCTIONAL ASSESSMENT: PAIN_FUNCTIONAL_ASSESSMENT: 0-10

## 2024-12-27 ASSESSMENT — PAIN SCALES - GENERAL: PAINLEVEL_OUTOF10: 4

## 2024-12-27 NOTE — PROGRESS NOTES
"  Physical Therapy Treatment and Discharge     Patient Name: Soni Cleveland  MRN: [id number]  Today's Date: 12/27/2024  Time Calculation  Start Time: 1415  Stop Time: 1450  Time Calculation (min): 35 min        PT Therapeutic Procedures Time Entry  Neuromuscular Re-Education Time Entry: 2  Therapeutic Exercise Time Entry: 33                Current Problem  1. Ataxia  Follow Up In Physical Therapy        Problem List Items Addressed This Visit             ICD-10-CM    Ataxia R27.0       General  Reason for Referral: balance/gait impairment  Referred By: Meet HERNANDEZ  Past Medical History Relevant to Rehab: diastolic heart failure, pulmonary hypertension, PE, DVT, vertigo, stage 3b CKD, depression with anxiety, chronic left sided low back pain with left sided sciatica, chronic fatigue, gait abnormality.    Subjective   Current Condition:   Same  Patient reports being really busy lately. Patient states that she does her home exercise program about 1x/wk.    Performing HEP?: Partially    Precautions  Precautions  Medical Precautions: Fall precautions  Pain  Pain Assessment: 0-10  0-10 (Numeric) Pain Score: 4  Pain Location: Back  Pain Orientation: Lower  Pain Descriptors: Aching, Dull    Objective     Hip PROM  Hip PROM WFL: yes  Specific Lower Extremity MMT  R Iliopsoas: (5/5): 4/5  L Iliopsoas: (5/5): 4/5  R Gluteals (sidelying): (5/5): 4/5  L Gluteals (sidelying): (5/5): 4/5    Knee AROM  Knee AROM WFL: yes    Knee MMT  R knee flexion: (5/5): 4+/5  L knee flexion: (5/5): 4+/5  R knee extension: (5/5): 4+/5  L knee extension: (5/5): 4+/5    Outcome Measures:  Other Measures  Other Outcome Measures: AM-PAC short form:38    Treatments:  Therapeutic Exercise  Therapeutic Exercise Performed: Yes  Therapeutic Exercise Activity 1: bridge x10  Therapeutic Exercise Activity 2: SLR x10  Therapeutic Exercise Activity 3: h/l hip add 3\" x10  Therapeutic Exercise Activity 4: LAQ x20  Therapeutic Exercise Activity 5: STS 20\" " "2x10  Therapeutic Exercise Activity 6: Standing hip ABD x20  Therapeutic Exercise Activity 7: Standing Hip Extension x10 R/L  Therapeutic Exercise Activity 9: Nu-Step lvl2 x5' seat #6    Balance/Neuromuscular Re-Education  Balance/Neuromuscular Re-Education Activity Performed: Yes  Balance/Neuromuscular Re-Education Activity 8: Tandem stance x20\" R, x23\" L    EDUCATION:   Individual(s) Educated: Patient  Education Provided: yes  Handout(s) Provided: Scanned into chart  Home Program: reviewed home exercise program  Risk and Benefits Discussed with Patient/Caregiver/Other: Yes   Patient/Caregiver Demonstrated Understanding: Yes   Patient Response to Education: Patient/Caregiver verbalized understanding of information, Patient/Caregiver performed return demonstration of exercises/activities, and Patient/Caregiver asked appropriate questions    Assessment: Patient is progressing slowly towards her physical therapy goals. Patient's attendance to physical therapy sessions has been inconsistent and she is not consistently performing her home exercise program. Patient was encouraged to try and perform her home exercise program more regularly. Patient wishes to discharge from physical therapy at this time. Physical therapy will discharge patient from physical therapy.    PT Assessment  PT Assessment Results: Decreased strength, Decreased range of motion, Impaired balance, Decreased mobility, Obesity, Pain  Rehab Prognosis: Good  Evaluation/Treatment Tolerance: Patient tolerated treatment well    Plan: Continue with POC. Discharge physical therapy.  OP PT Plan  Treatment/Interventions: Education/ Instruction, Gait training, Neuromuscular re-education, Therapeutic activities, Therapeutic exercises  PT Plan: No Additional PT interventions required at this time  Onset Date: 10/08/24  Certification Period Start Date: 11/07/24  Certification Period End Date: 12/12/24  Number of Treatments Authorized: 3 of 5  Rehab Potential: " Good  Plan of Care Agreement: Patient    Goals:  Active       PT Problem       Patient will maintain Tandem Stance for at least 10 sec with no upper extremity support. (Not Progressing)       Start:  11/07/24    Expected End:  12/12/24            Patient will ambulate 1 mile distance using no device with independent without needing to sit and rest. (Not Progressing)       Start:  11/07/24    Expected End:  12/12/24            Patient will ascend and descend 1 flight of stairs with rail modified independent and reciprocal pattern. (Progressing)       Start:  11/07/24    Expected End:  12/12/24            Patient will achieve bilateral hip flexion strength of at least 4+/5 (Not Progressing)       Start:  11/07/24    Expected End:  12/12/24            Patient will achieve bilateral hip abduction strength of at least 4+/5 (Not Progressing)       Start:  11/07/24    Expected End:  12/12/24            Patient will demonstrate independence in home program for support of progression (Progressing)       Start:  11/07/24    Expected End:  12/12/24            Patient will report pain of no more than 2/10 demonstrating a reduction of overall pain (Met)       Start:  11/07/24    Expected End:  12/12/24    Resolved:  12/27/24         Patient will show a significant change in AM-PAC (26 to 36) patient reported outcome tool to demonstrate subjective imporovement (Met)       Start:  11/07/24    Expected End:  12/12/24    Resolved:  12/27/24              Avtar Schwab, PT

## 2024-12-31 ENCOUNTER — APPOINTMENT (OUTPATIENT)
Dept: PHYSICAL THERAPY | Facility: HOSPITAL | Age: 74
End: 2024-12-31
Payer: MEDICARE

## 2025-01-06 ENCOUNTER — APPOINTMENT (OUTPATIENT)
Dept: OTOLARYNGOLOGY | Facility: CLINIC | Age: 75
End: 2025-01-06
Payer: MEDICARE

## 2025-01-06 ENCOUNTER — APPOINTMENT (OUTPATIENT)
Dept: AUDIOLOGY | Facility: CLINIC | Age: 75
End: 2025-01-06
Payer: MEDICARE

## 2025-01-06 DIAGNOSIS — H91.13 PRESBYCUSIS OF BOTH EARS: Primary | ICD-10-CM

## 2025-01-06 DIAGNOSIS — H90.3 SENSORINEURAL HEARING LOSS (SNHL) OF BOTH EARS: Primary | ICD-10-CM

## 2025-01-06 DIAGNOSIS — H93.19 TINNITUS, UNSPECIFIED LATERALITY: ICD-10-CM

## 2025-01-06 DIAGNOSIS — H93.13 SUBJECTIVE TINNITUS OF BOTH EARS: ICD-10-CM

## 2025-01-06 PROCEDURE — 92557 COMPREHENSIVE HEARING TEST: CPT | Performed by: AUDIOLOGIST

## 2025-01-06 PROCEDURE — 1159F MED LIST DOCD IN RCRD: CPT | Performed by: OTOLARYNGOLOGY

## 2025-01-06 PROCEDURE — 92550 TYMPANOMETRY & REFLEX THRESH: CPT | Performed by: AUDIOLOGIST

## 2025-01-06 PROCEDURE — 99213 OFFICE O/P EST LOW 20 MIN: CPT | Performed by: OTOLARYNGOLOGY

## 2025-01-06 PROCEDURE — 1036F TOBACCO NON-USER: CPT | Performed by: OTOLARYNGOLOGY

## 2025-01-06 ASSESSMENT — PAIN - FUNCTIONAL ASSESSMENT: PAIN_FUNCTIONAL_ASSESSMENT: 0-10

## 2025-01-06 ASSESSMENT — PAIN SCALES - GENERAL: PAINLEVEL_OUTOF10: 0 - NO PAIN

## 2025-01-06 NOTE — PROGRESS NOTES
Chief Complaint   hearing check up     History of Present Illness    01.06.2025: She had a hearing test today There is essentially no significant change in her hearing compared to last year. She has bilateral presbyacusis. Tinnitus (+).    Recommendation:  1- consider lenire treatment  ______________________________________________________________________    11.08.2024: On September 19, 2024, she fell at home and hit her nose hard on the ground. A CT scan revealed minor fractures at the tip of her nose. I evaluated her CT scan, nasal septum is deviated to left and there is right alexandra bullosa. However, she is breathing well through her nose and is satisfied with its appearance. My impression, surgery is not indicated at this time.    Plan  Follow-up as needed.        Interpreted By: Jennifer Theodore,  STUDY:  CT FACIAL BONES WO IV CONTRAST 9/19/2024 4:26 pm  INDICATION:  Signs/Symptoms:Fall    COMPARISON:  None.  ACCESSION NUMBER(S):  [id number]  ORDERING CLINICIAN:  ABDI ALONSO  TECHNIQUE:  Thin cut axial CT images through the facial bones were obtained and  reconstructed in the coronal and sagittal plane. 3d reconstructions  were generated at a separate workstation.  FINDINGS:  Orbits: The bony orbits are intact. The orbital contents are  unremarkable.  Facial Bones: Comminuted fracture of the nasal bones is noted with  associated soft tissue air. Right-sided nasal fragments are depressed  to a maximum of about 3 mm. There is a about 1 mm leftward  displacement of left nasal bone fracture fragment.  Mandible/Temporomandibular Joints: Visualized portions of mandible  and bilateral temporomandibular joints are intact. Streak artifact is  present from dental metal.  Paranasal Sinuses/Mastoids: A posterior right ethmoid air cell is  opacified. Remainder of the paranasal sinuses are clear. There is  alexandra bullosa on the right. Nasal septum is deviated to the left.  Soft tissues: Soft tissue swelling is present over  the nasal fracture.    IMPRESSION:  Comminuted fracture of the nasal bones with right-sided fracture  fragments depressed maximum of 3 mm. About 1 mm leftward shift of the  left nasal bone fracture fragment.  MACRO:  None  Signed by: Jennifer Theodore 9/19/2024 4:41 PM  Dictation workstation: [id number]  ______________________________________________________________________    01.05.2023: She had her hearing test done.  It shows bilateral presbycusis.  Left ear is worse than the right ear at 6 and 8 kHz, 15 dB difference.    Recommendations:  1-follow-up in 1 year with hearing test  2-consider using hearing aids  _________________________________________________________________    11.03.2023: She has decreased hearing bilaterally, more on the left side. She has severely annoying tinnitus.     Plan:  1- Hearing test  2- information regarding lenire was provided  _________________________________________________________________    05.05.2023: Tinnitus makes her crazy at night. During the day she can manage it. It is like cricket sounds.  TMs look intact.     Recommendations:  1- hearing test  2- consider lenire device   ____________________________________________________________________________________________      03.08.2021: Ms. Cleveland is a 71 yo F. She is referred by Dr. John for tinnitus in ears. It has been going on for years.   Secondly, she has itching in her ear canals.   ____________________________________________________________________________________________     03.24.2021: She has bilateral presbyacusis and tinnitus. She had her hearing test today. Compared to her hearing test which was done ~3 years ago, there is not much progress in her hearing loss. It was explained to her that tinnitus is a benign condition and may happen with age related hearing loss. Stress and focusing on tinnitus can make it psychologically worse. Some of her current medications may have caused or may have increased her  tinnitus (aspirin, lisonopril and/or atorvastatin). I recommend to ask Dr. John if it is possible to stop/pause some of these medications for some time or not.      Recommendations:  1- avoid focusing on your tinnitus  2- follow up in 3 years       Physical Exam  (Old exam)  General appearance: Healthy-appearing, well-nourished, well groomed, in no acute distress.      Head and Face: Atraumatic with no masses, lesions, or scarring.      Facial strength: Normal strength and symmetry, no synkinesis or facial tic.      Eyes: Conjunctivas look non-hyperemic bilaterally     Ears: Ear canals look normal. Mild wax bilaterally. Tympanic membranes look intact, no hyperemia, fluid or retraction. Mild sclerosis at left posteroinferiorly. She may have had effusion when she was a little kid.     Nose: No purulent discharge. (previous exam)     Throat: No tonsil hypertrophy. No hyperemia. (previous exam)     Pulmonary: Normal respiratory effort.      Lymphatic No palpable pathologic lymph nodes at neck. (previous exam)     Neurological/Psychiatric Orientation to person, place, and time: Normal.   Mood and affect: Normal.      Extremities: No clubbing.      Skin: No skin lesions were noted at face or neck     Procedure    03.24.2021: Hearing Test  Appointment time: 9:10-9:40     Otoscopy revealed clear ear canals with visual inspection of the tympanic membranes bilaterally.     Behavioral hearing evaluation revealed slight asymmetry with the left ear being worse:  Right ear - normal hearing sensitivity 250-2000 Hz sloping to mild sensorineural hearing loss  Left ear - normal hearing sensitivity 250-2000 Hz sloping to moderate sensorineural hearing loss  *slight decrease bilaterally 9314-3647 Hz slince 2018     Speech reception thresholds obtained at a level consistent with pure tone thresholds bilaterally.     Word discrimination excellent bilaterally.     Tympanometry revealed:  Right ear - Type A, normal middle ear  function  Left ear - Type Ad, eardrum hypermobility with normal ear canal volume and middle ear pressure     Ipsilateral/contralateral acoustic reflexes present 500-4000 Hz bilaterally.        Recommendations:  1) Re-evaluate hearing annually, or sooner, if a change in hearing is noted  2) Hearing aids for both ears if struggling to hear  3) Use of fan or soothing noise during times of quiet (bed time) to help mask tinnitus     03.07.2018: Hearing test     Otoscopy revealed clear ear canals with visual inspection of the tympanic membranes bilaterally.     Behavioral hearing evaluation revealed slight asymmetry with the left ear being worse:  Right ear - normal hearing sensitivity 250-2000 Hz sloping to a mild high frequency sensorineural hearing loss  Left ear - normal hearing sensitivity 250-2000 Hz sloping to a moderate high frequency sensorineural hearing loss     Speech reception thresholds obtained at a level consistent with pure tone thresholds bilaterally.     Word discrimination excellent bilaterally.     Tympanometry revealed normal middle ear function bilaterally.     Ipsilateral acoustic reflexes present 500-4000 Hz bilaterally.    Diagnoses/Problems     · Tinnitus of both ears (388.30) (H93.13)     Assessment and Plan:     01.06.2025: She had a hearing test today There is essentially no significant change in her hearing compared to last year. She has bilateral presbyacusis. Tinnitus (+).    Recommendation:  1- consider lenire treatment  ______________________________________________________________________    11.08.2024 On September 19, 2024, she fell at home and hit her nose hard on the ground. A CT scan revealed minor fractures at the tip of her nose. I evaluated her CT scan, nasal septum is deviated to let and there is right alexandra bullosa. However, she is breathing well through her nose and is satisfied with its appearance. My impression is that surgery is not indicated at this  time.    Plan  Follow-up as needed.  _________________________________________________________________    01.05.2023: She had her hearing test done.  It shows bilateral presbycusis.  Left ear is worse than the right ear at 6 and 8 kHz, 15 dB difference.    Recommendations:  1-follow-up in 1 year with hearing test  2-consider using hearing aids  _________________________________________________________________    11.03.2023: She has decreased hearing bilaterally, more on the left side. She has severely annoying tinnitus.     Plan:  1- Hearing test  2- information regarding lenire was provided    _________________________________________________________________    05.05.2023: Tinnitus makes her crazy at night. During the day she can manage it. It is like cricket sounds.  TMs look intact.     Recommendations:  1- hearing test  2- consider lenire device     ____________________________________________________________________________________________     03.24.2021: She has bilateral presbyacusis and tinnitus. She had her hearing test today. Compared to her hearing test which was done ~3 years ago, there is not much progress in her hearing loss. It was explained to her that tinnitus is a benign condition and may happen with age related hearing loss. Stress and focusing on tinnitus can make it psychologically worse. Some of her current medications may have caused or may have increased her tinnitus (aspirin, lisonopril and/or atorvastatin). I recommend to ask Dr. John if it is possible to stop/pause some of these medications for some time or not.      Recommendations:  1- avoid focusing on your tinnitus  2- follow up in 3 years

## 2025-01-06 NOTE — PROGRESS NOTES
Soni Cleveland, age 74 years, is here today for a hearing evaluation.     Difficulty hearing - yes, both ears for the past several years  Tinnitus - yes, both ears  Excessive noise exposure - no  Chronic ear infections - yes, childhood   Ear pain - no  Ear drainage - no  Past ear surgery - no  Vertigo - not currently , last episode 30 years ago  Past hearing aid use - no  Family history - no    Appointment time: 11-12    Otoscopy revealed clear ear canals with visual inspection of the tympanic membranes bilaterally.    Behavioral hearing evaluation:  Right ear - normal hearing sensitivity 125-2000 Hz sloping to mild to moderate sensorineural hearing loss 7973-5344 Hz  Left ear - normal hearing sensitivity 125-2000 Hz sloping to mild to moderate sensorineural hearing loss 4628-4671 Hz  *stable since 12-    Speech reception thresholds obtained at a level consistent with pure tone thresholds bilaterally.    Word discrimination:  Right ear - excellent (100%)  Left ear - excellent (100%)    Tympanometry:  Right ear - Type A, normal middle ear function  Left ear - Type Ad, eardrum hypermobility with normal ear canal volume and normal middle ear pressure    Ipsilateral acoustic reflexes:  Probe right - present 500-4000 Hz  Probe left - present 500-4000 Hz    Contralateral acoustic reflexes:  Probe right - present 500-4000 Hz  Probe left - present 500-4000 Hz  The presence of acoustic reflexes within normal intensity limits is consistent with normal middle ear and brainstem function, and suggests that auditory sensitivity is not significantly impaired.     Recommendations:  1) Hearing aids for both ears if she feels like she is struggling  2) Re-evaluate hearing annually, to monitor, or sooner if a change in hearing is noted

## 2025-01-13 ENCOUNTER — APPOINTMENT (OUTPATIENT)
Dept: PHARMACY | Facility: HOSPITAL | Age: 75
End: 2025-01-13
Payer: MEDICARE

## 2025-01-13 ENCOUNTER — LAB (OUTPATIENT)
Dept: LAB | Facility: LAB | Age: 75
End: 2025-01-13
Payer: MEDICARE

## 2025-01-13 DIAGNOSIS — Z86.711 HISTORY OF PULMONARY EMBOLISM: ICD-10-CM

## 2025-01-13 DIAGNOSIS — M81.0 AGE-RELATED OSTEOPOROSIS WITHOUT CURRENT PATHOLOGICAL FRACTURE: ICD-10-CM

## 2025-01-13 LAB
ALBUMIN SERPL BCP-MCNC: 4.2 G/DL (ref 3.4–5)
ALP SERPL-CCNC: 94 U/L (ref 33–136)
ALT SERPL W P-5'-P-CCNC: 22 U/L (ref 7–45)
ANION GAP SERPL CALC-SCNC: 15 MMOL/L (ref 10–20)
AST SERPL W P-5'-P-CCNC: 23 U/L (ref 9–39)
BILIRUB SERPL-MCNC: 0.6 MG/DL (ref 0–1.2)
BUN SERPL-MCNC: 20 MG/DL (ref 6–23)
CALCIUM SERPL-MCNC: 9.4 MG/DL (ref 8.6–10.3)
CHLORIDE SERPL-SCNC: 102 MMOL/L (ref 98–107)
CO2 SERPL-SCNC: 27 MMOL/L (ref 21–32)
CREAT SERPL-MCNC: 1.45 MG/DL (ref 0.5–1.05)
EGFRCR SERPLBLD CKD-EPI 2021: 38 ML/MIN/1.73M*2
GLUCOSE SERPL-MCNC: 109 MG/DL (ref 74–99)
POTASSIUM SERPL-SCNC: 4.1 MMOL/L (ref 3.5–5.3)
PROT SERPL-MCNC: 7 G/DL (ref 6.4–8.2)
SODIUM SERPL-SCNC: 140 MMOL/L (ref 136–145)

## 2025-01-13 PROCEDURE — 82330 ASSAY OF CALCIUM: CPT

## 2025-01-13 PROCEDURE — 80053 COMPREHEN METABOLIC PANEL: CPT

## 2025-01-13 NOTE — PROGRESS NOTES
Clinical Pharmacy Appointment    Patient ID: Soni Cleveland is a 74 y.o. female who presents for Follow Up appointment.     Referring Provider: Vanessa Dsouza PA-C  PCP: Vanessa Dsouza PA-C   Last visit with PCP: 10/8/24   Next visit with PCP: 2/17/25    Subjective   ANTICOAGULATION  Does patient follow with cardiology? Yes    Date: 8/8/2025     Anticoagulation Indication: prevention of venous thromboembolism - hx of DVT/PE  Patient is projected to be on anticoagulation indefinitely.      Current anticoagulants include:  Apixaban 2.5 mg BID   Unprovoked B/L PE in July 2021  Heme/onc recommends lifelong anticoagulation with 2.5 mg BID    Adverse Effects: No significant side effects; some bruising.      Monitoring:  Date of last BMP: 10/1/24  Recent Hospitalizations: None   Recent Falls/Trauma: Yes, 9/19/24. Tripped on rug and hit face on floor. Presented to ED for evaluation. Rib and nose fracture. All bruising healed at this time.    Changes in Tobacco or Alcohol Intake: None      Medication Reconciliation:  No changes     Drug Interactions  No relevant drug interactions were noted.     Medication System Management  Patient's preferred pharmacy: [facility]   Adherence/Organization: No issues - using Eliquis 5 mg tablets and cutting in half (3 weeks remaining of current supply)  Affordability/Accessibility: Eliquis - around $100 per month  Screened for [facility] PAP   Income exceed 400% FPL   Free one month coupon - has 4 weeks remaining     Objective   Allergies   Allergen Reactions    Penicillin Hives and Rash     Prescribed ceftriaxone in July 2021     Social History     Social History Narrative    Not on file      Medication Review  Current Outpatient Medications   Medication Instructions    acetaminophen (TYLENOL) 650 mg, Every 6 hours PRN    apixaban (ELIQUIS) 2.5 mg, oral, 2 times daily    artificial tears, dextran-hypomel-glycerin, 0.1-0.3-0.2 % ophthalmic solution Administer into affected eye(s).     atorvastatin (LIPITOR) 40 mg, oral, Nightly    bisoprolol (ZEBETA) 2.5 mg, oral, Daily    cholecalciferol (VITAMIN D-3) 2,000 Units, Daily RT    denosumab (Prolia) 60 mg/mL syringe Prolia    diclofenac sodium (VOLTAREN ARTHRITIS PAIN) 4 g, Topical, 4 times daily PRN    escitalopram (LEXAPRO) 10 mg, oral, Daily    HAIR, SKIN AND NAILS, BIOTIN, ORAL Daily RT    ketoconazole (NIZOral) 2 % cream     lidocaine (Lidoderm) 5 % patch 1 patch, transdermal, Daily, Remove & discard patch within 12 hours or as directed by MD.    lisinopril 10 mg, oral, Daily    minoxidil (Rogaine) 2 % external solution 2 times daily    nitroglycerin (NITROSTAT) 0.4 mg, sublingual, Every 5 min PRN, DISSOLVE 1 TABLET UNDER THE TONGUE EVERY 5 MINUTES FOR UP TO 3 DOSES AS NEEDED FOR CHEST PAIN.CALL 911 IF PAIN PERSISTS.    sod sulf-pot chloride-mag sulf 1.479-0.188- 0.225 gram tablet Take 12 tablets by mouth the day before and 12 tablets by mouth the day of colonoscopy per included instructions. Finish prep per instructions.    triamcinolone (Kenalog) 0.1 % cream Topical, 2 times daily, Apply to affected area 1-2 times daily as needed. Avoid face and groin.      Vitals  BP Readings from Last 2 Encounters:   12/17/24 118/68   10/08/24 120/82     BMI Readings from Last 1 Encounters:   12/17/24 37.31 kg/m²      Labs  A1C  Lab Results   Component Value Date    HGBA1C 5.3 08/22/2023    HGBA1C 6.0 07/10/2021    HGBA1C 5.8 01/14/2019     BMP  Lab Results   Component Value Date    CALCIUM 10.1 10/01/2024     10/01/2024    K 3.9 10/01/2024    CO2 27 10/01/2024     10/01/2024    BUN 24 (H) 10/01/2024    CREATININE 1.51 (H) 10/01/2024    EGFR 37 (L) 10/01/2024     LFTs  Lab Results   Component Value Date    ALT 22 10/01/2024    AST 23 10/01/2024    ALKPHOS 112 10/01/2024    BILITOT 0.4 10/01/2024     FLP  Lab Results   Component Value Date    TRIG 175 (H) 06/01/2024    CHOL 184 06/01/2024    LDLF 76 03/18/2023    LDLCALC 88 06/01/2024    HDL 60.9  06/01/2024     Urine Microalbumin  Lab Results   Component Value Date    MICROALBCREA 9.8 03/18/2023     Weight Management  Wt Readings from Last 3 Encounters:   12/17/24 92.5 kg (204 lb)   10/22/24 95.3 kg (210 lb)   10/08/24 95 kg (209 lb 6.4 oz)      There is no height or weight on file to calculate BMI.     Assessment/Plan   Problem List Items Addressed This Visit       History of pulmonary embolism       Rationale for plan: Given PMH of PE, Heme/Onc recommends lifelong anticoagulation. At this time, patient denies any side effects or concerns with current anticoagulation regimen. Patient's lab work last completed on 1/13/25. Reports primary concern is related to copay associated with Eliquis therapy. Patient is NOT enrolled in  PAP for Eliquis due to income exceeding 400% FPL. Patient provided with free-trial voucher for one month fill - has full supply remaining. Test claim shows $47 copay for 2.5 mg tablets. Planning to fill through  for future fills. Will continue to follow.    Medication Changes:   CONTINUE Eliquis 2.5 mg BID      Monitoring and Education Discussed:  Counseled patient of side effects that are indicative of bleeding such as dark tarry stool, unexplainable bruising, or vomiting up a coffee ground like substance  Reminded patient of importance of anticoagulation therapy to prevent risk of heart attack or stroke  Counseled patient on MOA, expectations, duration of therapy, contraindications, administration, and monitoring parameters   Answered all patient questions and concerns    Clinical Pharmacist follow-up: 1/28/25 @ 1330, Telehealth visit    Continue all meds under the continuation of care with the referring provider and clinical pharmacy team.    Thank you,  Maite Jane, PharmD  Clinical Pharmacist  [phone]    Verbal consent to manage patient's drug therapy was obtained from the patient. They were informed they may decline to participate or withdraw from participation in  pharmacy services at any time.

## 2025-01-14 ENCOUNTER — INFUSION (OUTPATIENT)
Dept: HEMATOLOGY/ONCOLOGY | Facility: HOSPITAL | Age: 75
End: 2025-01-14
Payer: MEDICARE

## 2025-01-14 VITALS
HEIGHT: 62 IN | RESPIRATION RATE: 16 BRPM | BODY MASS INDEX: 37.73 KG/M2 | DIASTOLIC BLOOD PRESSURE: 72 MMHG | WEIGHT: 205.03 LBS | HEART RATE: 58 BPM | SYSTOLIC BLOOD PRESSURE: 122 MMHG | OXYGEN SATURATION: 94 %

## 2025-01-14 DIAGNOSIS — M81.0 AGE-RELATED OSTEOPOROSIS WITHOUT CURRENT PATHOLOGICAL FRACTURE: ICD-10-CM

## 2025-01-14 LAB — CA-I BLD-SCNC: 1.29 MMOL/L (ref 1.1–1.33)

## 2025-01-14 PROCEDURE — 2500000004 HC RX 250 GENERAL PHARMACY W/ HCPCS (ALT 636 FOR OP/ED): Mod: TB | Performed by: PHYSICIAN ASSISTANT

## 2025-01-14 PROCEDURE — 96372 THER/PROPH/DIAG INJ SC/IM: CPT

## 2025-01-14 RX ORDER — DIPHENHYDRAMINE HYDROCHLORIDE 50 MG/ML
50 INJECTION INTRAMUSCULAR; INTRAVENOUS AS NEEDED
OUTPATIENT
Start: 2025-07-12

## 2025-01-14 RX ORDER — FAMOTIDINE 10 MG/ML
20 INJECTION INTRAVENOUS ONCE AS NEEDED
OUTPATIENT
Start: 2025-07-12

## 2025-01-14 RX ORDER — EPINEPHRINE 0.3 MG/.3ML
0.3 INJECTION SUBCUTANEOUS EVERY 5 MIN PRN
OUTPATIENT
Start: 2025-07-12

## 2025-01-14 RX ORDER — ALBUTEROL SULFATE 0.83 MG/ML
3 SOLUTION RESPIRATORY (INHALATION) AS NEEDED
OUTPATIENT
Start: 2025-07-12

## 2025-01-14 RX ADMIN — DENOSUMAB 60 MG: 60 INJECTION SUBCUTANEOUS at 11:12

## 2025-01-14 ASSESSMENT — PATIENT HEALTH QUESTIONNAIRE - PHQ9
2. FEELING DOWN, DEPRESSED OR HOPELESS: SEVERAL DAYS
10. IF YOU CHECKED OFF ANY PROBLEMS, HOW DIFFICULT HAVE THESE PROBLEMS MADE IT FOR YOU TO DO YOUR WORK, TAKE CARE OF THINGS AT HOME, OR GET ALONG WITH OTHER PEOPLE: NOT DIFFICULT AT ALL
1. LITTLE INTEREST OR PLEASURE IN DOING THINGS: SEVERAL DAYS
SUM OF ALL RESPONSES TO PHQ9 QUESTIONS 1 & 2: 2

## 2025-01-14 ASSESSMENT — PAIN SCALES - GENERAL: PAINLEVEL_OUTOF10: 0-NO PAIN

## 2025-01-27 ENCOUNTER — APPOINTMENT (OUTPATIENT)
Dept: PREADMISSION TESTING | Facility: HOSPITAL | Age: 75
End: 2025-01-27
Payer: MEDICARE

## 2025-01-28 ENCOUNTER — APPOINTMENT (OUTPATIENT)
Dept: PHARMACY | Facility: HOSPITAL | Age: 75
End: 2025-01-28
Payer: MEDICARE

## 2025-01-28 PROCEDURE — RXMED WILLOW AMBULATORY MEDICATION CHARGE

## 2025-01-29 ENCOUNTER — PHARMACY VISIT (OUTPATIENT)
Dept: PHARMACY | Facility: CLINIC | Age: 75
End: 2025-01-29
Payer: COMMERCIAL

## 2025-02-17 ENCOUNTER — APPOINTMENT (OUTPATIENT)
Dept: PRIMARY CARE | Facility: CLINIC | Age: 75
End: 2025-02-17
Payer: MEDICARE

## 2025-02-19 ENCOUNTER — APPOINTMENT (OUTPATIENT)
Dept: GASTROENTEROLOGY | Facility: HOSPITAL | Age: 75
End: 2025-02-19
Payer: MEDICARE

## 2025-02-21 ENCOUNTER — PHARMACY VISIT (OUTPATIENT)
Dept: PHARMACY | Facility: CLINIC | Age: 75
End: 2025-02-21
Payer: COMMERCIAL

## 2025-02-21 PROCEDURE — RXMED WILLOW AMBULATORY MEDICATION CHARGE

## 2025-03-07 ENCOUNTER — TELEPHONE (OUTPATIENT)
Dept: HEMATOLOGY/ONCOLOGY | Facility: HOSPITAL | Age: 75
End: 2025-03-07
Payer: MEDICARE

## 2025-03-07 NOTE — TELEPHONE ENCOUNTER
Patient scheduled for MD follow up with Dr. Esteves on Thurs, 7/17/25. Due to a change in provider's schedule, this appointment needs to be rescheduled. Reached out to patient to reschedule. Patient agreeable. Rescheduled to Wednesday, 7/16/25 at 1:40 PM. Patient verbalized understanding and agreement regarding discussed information via verbal feedback.

## 2025-03-20 ENCOUNTER — APPOINTMENT (OUTPATIENT)
Dept: PRIMARY CARE | Facility: CLINIC | Age: 75
End: 2025-03-20
Payer: MEDICARE

## 2025-03-20 VITALS
TEMPERATURE: 96.7 F | SYSTOLIC BLOOD PRESSURE: 122 MMHG | HEIGHT: 62 IN | DIASTOLIC BLOOD PRESSURE: 68 MMHG | BODY MASS INDEX: 37.58 KG/M2 | HEART RATE: 69 BPM | OXYGEN SATURATION: 99 % | WEIGHT: 204.2 LBS

## 2025-03-20 DIAGNOSIS — Z00.00 MEDICARE ANNUAL WELLNESS VISIT, SUBSEQUENT: Primary | ICD-10-CM

## 2025-03-20 DIAGNOSIS — I27.20 PULMONARY HYPERTENSION (MULTI): ICD-10-CM

## 2025-03-20 DIAGNOSIS — Z78.0 ASYMPTOMATIC POSTMENOPAUSAL STATE: ICD-10-CM

## 2025-03-20 DIAGNOSIS — Z86.711 HISTORY OF PULMONARY EMBOLISM: ICD-10-CM

## 2025-03-20 DIAGNOSIS — M81.0 AGE-RELATED OSTEOPOROSIS WITHOUT CURRENT PATHOLOGICAL FRACTURE: ICD-10-CM

## 2025-03-20 DIAGNOSIS — J45.20 MILD INTERMITTENT ASTHMA WITHOUT COMPLICATION (HHS-HCC): ICD-10-CM

## 2025-03-20 DIAGNOSIS — F41.9 ANXIETY: ICD-10-CM

## 2025-03-20 DIAGNOSIS — N18.32 STAGE 3B CHRONIC KIDNEY DISEASE (MULTI): ICD-10-CM

## 2025-03-20 DIAGNOSIS — I77.810 MILD ASCENDING AORTA DILATATION (CMS-HCC): ICD-10-CM

## 2025-03-20 PROCEDURE — 3008F BODY MASS INDEX DOCD: CPT | Performed by: PHYSICIAN ASSISTANT

## 2025-03-20 PROCEDURE — G0439 PPPS, SUBSEQ VISIT: HCPCS | Performed by: PHYSICIAN ASSISTANT

## 2025-03-20 PROCEDURE — 1159F MED LIST DOCD IN RCRD: CPT | Performed by: PHYSICIAN ASSISTANT

## 2025-03-20 PROCEDURE — 1160F RVW MEDS BY RX/DR IN RCRD: CPT | Performed by: PHYSICIAN ASSISTANT

## 2025-03-20 PROCEDURE — 3074F SYST BP LT 130 MM HG: CPT | Performed by: PHYSICIAN ASSISTANT

## 2025-03-20 PROCEDURE — 3078F DIAST BP <80 MM HG: CPT | Performed by: PHYSICIAN ASSISTANT

## 2025-03-20 PROCEDURE — 1170F FXNL STATUS ASSESSED: CPT | Performed by: PHYSICIAN ASSISTANT

## 2025-03-20 PROCEDURE — 1036F TOBACCO NON-USER: CPT | Performed by: PHYSICIAN ASSISTANT

## 2025-03-20 RX ORDER — HYDROXYZINE HYDROCHLORIDE 10 MG/1
10 TABLET, FILM COATED ORAL EVERY 8 HOURS PRN
Qty: 30 TABLET | Refills: 0 | Status: SHIPPED | OUTPATIENT
Start: 2025-03-20

## 2025-03-20 ASSESSMENT — ACTIVITIES OF DAILY LIVING (ADL)
MANAGING_FINANCES: INDEPENDENT
GROCERY_SHOPPING: INDEPENDENT
TAKING_MEDICATION: INDEPENDENT
BATHING: INDEPENDENT
DRESSING: INDEPENDENT
DOING_HOUSEWORK: INDEPENDENT

## 2025-03-20 ASSESSMENT — PATIENT HEALTH QUESTIONNAIRE - PHQ9
1. LITTLE INTEREST OR PLEASURE IN DOING THINGS: SEVERAL DAYS
SUM OF ALL RESPONSES TO PHQ9 QUESTIONS 1 AND 2: 2
2. FEELING DOWN, DEPRESSED OR HOPELESS: SEVERAL DAYS
10. IF YOU CHECKED OFF ANY PROBLEMS, HOW DIFFICULT HAVE THESE PROBLEMS MADE IT FOR YOU TO DO YOUR WORK, TAKE CARE OF THINGS AT HOME, OR GET ALONG WITH OTHER PEOPLE: SOMEWHAT DIFFICULT

## 2025-03-20 NOTE — PROGRESS NOTES
Subjective   HPI   Soni Cleveland is a 74 y.o. year old female patient with presenting to clinic with concern for Medicare Visit     Chief Complaint   Patient presents with    Medicare Annual Wellness Visit Subsequent     Got Covid and Flu shot. Discuss PNE.       HTN  -bisoprolol  -lisinopril 10  BP Readings from Last 5 Encounters:   03/20/25 122/68   01/14/25 122/72   12/17/24 118/68   10/08/24 120/82   10/01/24 112/58      HLD  -atorvastatin 40    ASCVD  -NTG SL PRN    Depression & Anxiety  -lexapro    Osteoporosis  -prolia  -Calcium  -Vit D  DEXA due 3/2025 ordered    Ataxia  Hasn't felt off balance, but has noted gait change in the past few years. Has been more careful of foot placement. I recommend physical therapy, done.        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     CHUCK  Noncompliant     Anxious & depression  -Lexapro  -start hydroxyzine      Pneumonvax 20 cannot be done until next year  Patient Care Team:  Vanessa Dsouza PA-C as PCP - General (Family Medicine)  Vanessa Dsouza PA-C as PCP - [facility] Attributed Provider  Bibiana An MD as Consulting Physician (Hematology and Oncology)  Miles Lubin MD as Medical Oncologist (Hematology and Oncology)     Specialists    Past Medical, Surgical, and Family History reviewed and updated in chart.    Reviewed all medications by prescribing practitioner or clinical pharmacist (such as prescriptions, OTCs, herbal therapies and supplements) and documented in the medical record.     Preventative Health   Health Maintenance Due   Topic Date Due    Hepatitis A Vaccine (1 of 2 - Risk 2-dose series) Never done    Hepatitis B Vaccine (1 of 3 - Risk 3-dose series) Never done    Screening for Diabetes  03/18/2024    Bone density scan  03/24/2025            Topic Date Due    Hepatitis A Vaccines (1 of 2 - Risk 2-dose series) Never done    Hepatitis B Vaccines (1 of 3 - Risk 3-dose series)  Never done        Immunizations Reviewed  Immunization History   Administered Date(s) Administered    Flu vaccine, quadrivalent, high-dose, preservative free, age 65y+ (FLUZONE) 12/02/2021    Flu vaccine, trivalent, preservative free, HIGH-DOSE, age 65y+ (Fluzone) 10/09/2019, 11/15/2024    Flu vaccine, trivalent, preservative free, age 6 months and greater (Fluarix/Fluzone/Flulaval) 10/01/2014    Influenza, Seasonal, Quadrivalent, Adjuvanted 11/05/2020, 09/12/2022, 10/19/2023    Influenza, seasonal, injectable 09/12/2022    Moderna COVID-19 vaccine, 12 years and older (50mcg/0.5mL)(Spikevax) 09/21/2023, 11/15/2024    Moderna COVID-19 vaccine, bivalent, blue cap/gray label *Check age/dose* 10/08/2022    Moderna SARS-CoV-2 Vaccination 02/05/2021, 03/05/2021, 11/18/2021, 04/05/2022, 10/08/2022    Pneumococcal conjugate vaccine, 13-valent (PREVNAR 13) 07/18/2017    Pneumococcal polysaccharide vaccine, 23-valent, age 2 years and older (PNEUMOVAX 23) 10/17/2014, 01/14/2021    Pneumococcal, Unspecified 01/14/2021    RSV, 60 Years And Older (AREXVY) 09/21/2023    Tdap vaccine, age 7 year and older (BOOSTRIX, ADACEL) 10/19/2023    Zoster vaccine, recombinant, adult (SHINGRIX) 01/24/2021, 12/15/2021    Zoster, live 10/01/2014        Problem List Reviewed  Patient Active Problem List   Diagnosis    Abnormal EKG    Abnormally compliant left middle ear system with type AD tympanogram curve    Adrenal nodule    Agatston coronary artery calcium score less than 100    Age-related nuclear cataract, bilateral    Allergic reaction    Angina pectoris    Asymmetric SNHL (sensorineural hearing loss)    Primary hypertension    Chronic fatigue    Class 2 severe obesity due to excess calories with serious comorbidity and body mass index (BMI) of 39.0 to 39.9 in adult    Depression with anxiety    Dry eyes    Dyslipidemia    Dysphagia    Gait abnormality    Heart palpitations    Mild intermittent asthma without complication ([facility])    Neck  pain    Obstructive sleep apnea    Posterior vitreous detachment of both eyes    Presbyacusis    Renal cyst    Restless legs syndrome    Stage 3b chronic kidney disease (Multi)    Stress incontinence in female    Tinnitus of both ears    Tubular adenoma of colon    Vitamin D deficiency    Vitreous floaters    Decreased hearing    Chronic left-sided low back pain with left-sided sciatica    Mild ascending aorta dilatation (CMS-HCC)    Age-related osteoporosis without current pathological fracture    Gastrointestinal hemorrhage    Bright red rectal bleeding    Shortness of breath    Adrenal mass 1 cm to 4 cm in diameter (Multi)    Vertigo    Posterior vitreous detachment    Osteopenia    Nuclear senile cataract    Migraine    Metabolic syndrome    Memory impairment    History of pulmonary embolism    Gastrointestinal hemorrhage with melena    Elevated cholesterol    Diastolic heart failure    Deep vein thrombosis (DVT) (Multi)    Cough    Nonscarring hair loss, unspecified    Alopecia    Adenoma of right adrenal gland    Pulmonary hypertension (Multi)    Polyneuropathy, unspecified    Asymptomatic postmenopausal state    Cervical dysphagia    Cystic kidney disease, unspecified    Deficiency of other specified B group vitamins    Iron deficiency anemia, unspecified    Polyp of colon    Disorder of phosphorus metabolism, unspecified    Hypertensive heart and chronic kidney disease with heart failure and stage 1 through stage 4 chronic kidney disease, or unspecified chronic kidney disease    Fatty (change of) liver, not elsewhere classified    H/O high risk medication treatment    Ataxia       Past Medical History:   Diagnosis Date    Acute pulmonary embolism (Multi) 12/15/2023    Acute pulmonary embolism without acute cor pulmonale (Multi) 12/15/2023    Anxiety     Asymmetrical sensorineural hearing loss 03/09/2018    Asymmetrical left sensorineural hearing loss    Chronic deep vein thrombosis (DVT) of lower extremity  (Multi) 2023    Chronic fatigue 2021    History of chronic fatigue    Chronic kidney disease     Chronic kidney disease, stage 3a (Multi) 2021    now Stage 3B CKD  Aug 2023    Clotting disorder (Multi) 2019    Depression     Fall 2023    Subsequent encounter    GI bleeding 2023    Gross hematuria 2023    Hyperlipidemia     Hypertension     Impacted cerumen, right ear 2018    Impacted cerumen of right ear    Pulmonary embolism     Sleep apnea     No CPAP    Tinnitus, bilateral 2020    Tinnitus, bilateral    Urinary tract infection 2021    Visual impairment       Past Surgical History:   Procedure Laterality Date    APPENDECTOMY  2015    Appendectomy    CARDIAC CATHETERIZATION  2018    HYSTERECTOMY  2015    Hysterectomy    MR HEAD ANGIO WO IV CONTRAST  2012    MR HEAD ANGIO WO IV CONTRAST [facility]    MR NECK ANGIO WO IV CONTRAST  2012    MR NECK ANGIO WO IV CONTRAST [facility]    TONSILLECTOMY  2015    Tonsillectomy      Family History   Problem Relation Name Age of Onset    Cirrhosis Mother          NIEVES    Stroke Father Johan         CVA    Heart failure Father Johan         Congestive    Gout Father Johan     Heart attack Father Johan         Myocardial infarction    Heart disease Father Johan     Colon cancer Sister Miguel (colon cancer)     Cancer Sister Miguel (colon cancer)     Multiple sclerosis Sister Danelle     COPD Brother Jayesh       Social History     Tobacco Use    Smoking status: Former     Current packs/day: 0.00     Average packs/day: 1 pack/day for 10.0 years (10.0 ttl pk-yrs)     Types: Cigarettes     Start date: 1970     Quit date: 1980     Years since quittin.8    Smokeless tobacco: Never    Tobacco comments:     Was a smoker for about 10 yrs   Substance Use Topics    Alcohol use: Not Currently        Medications Reviewed  Current Outpatient Medications on File Prior  to Visit   Medication Sig Dispense Refill    acetaminophen (Tylenol) 325 mg tablet Take 2 tablets (650 mg) by mouth every 6 hours if needed.      apixaban (Eliquis) 2.5 mg tablet Take 1 tablet (2.5 mg) by mouth 2 times a day. 60 tablet 11    artificial tears, dextran-hypomel-glycerin, 0.1-0.3-0.2 % ophthalmic solution Administer into affected eye(s).      atorvastatin (Lipitor) 40 mg tablet Take 1 tablet (40 mg) by mouth once daily at bedtime. 90 tablet 3    bisoprolol (Zebeta) 5 mg tablet TAKE 1/2 TABLET BY MOUTH DAILY 45 tablet 2    cholecalciferol (Vitamin D-3) 25 MCG (1000 UT) tablet Take 2 tablets (2,000 Units) by mouth once daily.      denosumab (Prolia) 60 mg/mL syringe Prolia      diclofenac sodium (Voltaren Arthritis Pain) 1 % gel Apply 4.5 inches (4 g) topically 4 times a day as needed (pain). 100 g 0    escitalopram (Lexapro) 10 mg tablet TAKE 1 TABLET BY MOUTH EVERY DAY 90 tablet 1    HAIR, SKIN AND NAILS, BIOTIN, ORAL Take by mouth once daily.      ketoconazole (NIZOral) 2 % cream       lidocaine (Lidoderm) 5 % patch Place 1 patch over 12 hours on the skin once daily. Remove & discard patch within 12 hours or as directed by MD. 12 patch 0    lisinopril 10 mg tablet Take 1 tablet (10 mg) by mouth once daily. 90 tablet 3    minoxidil (Rogaine) 2 % external solution Apply topically 2 times a day.      nitroglycerin (Nitrostat) 0.4 mg SL tablet Place 1 tablet (0.4 mg) under the tongue every 5 minutes if needed for chest pain. DISSOLVE 1 TABLET UNDER THE TONGUE EVERY 5 MINUTES FOR UP TO 3 DOSES AS NEEDED FOR CHEST PAIN.CALL 911 IF PAIN PERSISTS. 90 tablet 1    sod sulf-pot chloride-mag sulf 1.479-0.188- 0.225 gram tablet Take 12 tablets by mouth the day before and 12 tablets by mouth the day of colonoscopy per included instructions. Finish prep per instructions. 24 tablet 0    triamcinolone (Kenalog) 0.1 % cream Apply topically 2 times a day. Apply to affected area 1-2 times daily as needed. Avoid face and  "groin. 30 g 0     No current facility-administered medications on file prior to visit.        Review of Systems  Constitutional: Denies fever  HEENT: Denies ST, earache  CVS: Denies Chest pain  Pulmonary: Denies wheezing, SOB  GI: Denies N/V  : Denies dysuria  Musculoskeletal:  Denies myalgia  Neuro: Denies focal weakness or numbness.  Skin: Denies Rashes.  *Review of Systems is negative unless otherwise mentioned in HPI or ROS above.      @OBJECTIVEBEGIN  /68   Pulse 69   Temp 35.9 °C (96.7 °F)   Ht 1.562 m (5' 1.5\")   Wt 92.6 kg (204 lb 3.2 oz)   SpO2 99%   BMI 37.96 kg/m²  reviewed Body mass index is 37.96 kg/m².     Physical Exam  Constitutional: NAD. Afebrile. Resting comfortably.  ENT: Nasal mucosa and oropharynx: moist oral mucosa. Posterior oropharynx nonerythematous. No posterior pharyngeal streaking.  Eyes: PERRLA. EOM intact. No vertical or circular nystagmus.  Lymph: No anterior cervical chain or submandibular lymphadenopathy. No sentinel lymph nodes.  Cardiac: Regular rate & rhythm. No murmur, gallops, or rubs.  Pulmonary: Lungs clear to auscultation bilaterally with good aeration. No wheezes, rhonchi, or rales. Normal WOB.  GI: Soft, Nontender, nondistended. No guarding. Normal BS x4.  : No suprapubic tenderness. No CVA tenderness to percussion.   Musculoskeletal: No peripheral edema.   Skin: No evidence of trauma. No rashes  Neuro: No focal neuro deficits. Normal gait without assistive devices.  Psych: Intact judgement and insight.    MDM        .Assessment/Plan   Problem List Items Addressed This Visit             ICD-10-CM    Mild intermittent asthma without complication ([facility]) J45.20    Stage 3b chronic kidney disease (Multi) N18.32    Mild ascending aorta dilatation ([facility]) I77.810    Age-related osteoporosis without current pathological fracture M81.0    History of pulmonary embolism Z86.711    Pulmonary hypertension (Multi) I27.20    Asymptomatic postmenopausal state Z78.0 "    Relevant Orders    XR DEXA bone density     Other Visit Diagnoses         Codes    Medicare annual wellness visit, subsequent    -  Primary Z00.00    Anxiety     F41.9    Relevant Medications    hydrOXYzine HCL (Atarax) 10 mg tablet

## 2025-03-24 ENCOUNTER — PHARMACY VISIT (OUTPATIENT)
Dept: PHARMACY | Facility: CLINIC | Age: 75
End: 2025-03-24
Payer: COMMERCIAL

## 2025-03-24 PROCEDURE — RXMED WILLOW AMBULATORY MEDICATION CHARGE

## 2025-04-15 ENCOUNTER — APPOINTMENT (OUTPATIENT)
Dept: RADIOLOGY | Facility: CLINIC | Age: 75
End: 2025-04-15
Payer: MEDICARE

## 2025-04-15 ENCOUNTER — HOSPITAL ENCOUNTER (OUTPATIENT)
Dept: RADIOLOGY | Facility: HOSPITAL | Age: 75
Discharge: HOME | End: 2025-04-15
Payer: MEDICARE

## 2025-04-15 DIAGNOSIS — Z78.0 ASYMPTOMATIC POSTMENOPAUSAL STATE: ICD-10-CM

## 2025-04-15 PROCEDURE — 77080 DXA BONE DENSITY AXIAL: CPT | Performed by: RADIOLOGY

## 2025-04-15 PROCEDURE — 77080 DXA BONE DENSITY AXIAL: CPT

## 2025-04-22 ENCOUNTER — PHARMACY VISIT (OUTPATIENT)
Dept: PHARMACY | Facility: CLINIC | Age: 75
End: 2025-04-22
Payer: COMMERCIAL

## 2025-04-22 PROCEDURE — RXMED WILLOW AMBULATORY MEDICATION CHARGE

## 2025-05-18 DIAGNOSIS — I10 PRIMARY HYPERTENSION: ICD-10-CM

## 2025-05-19 RX ORDER — BISOPROLOL FUMARATE 5 MG/1
2.5 TABLET, FILM COATED ORAL DAILY
Qty: 45 TABLET | Refills: 3 | Status: SHIPPED | OUTPATIENT
Start: 2025-05-19

## 2025-06-03 PROCEDURE — RXMED WILLOW AMBULATORY MEDICATION CHARGE

## 2025-06-05 ENCOUNTER — PHARMACY VISIT (OUTPATIENT)
Dept: PHARMACY | Facility: CLINIC | Age: 75
End: 2025-06-05
Payer: COMMERCIAL

## 2025-06-28 DIAGNOSIS — F32.A ANXIETY AND DEPRESSION: ICD-10-CM

## 2025-06-28 DIAGNOSIS — F41.9 ANXIETY AND DEPRESSION: ICD-10-CM

## 2025-06-28 RX ORDER — ESCITALOPRAM OXALATE 10 MG/1
10 TABLET ORAL DAILY
Qty: 90 TABLET | Refills: 0 | Status: SHIPPED | OUTPATIENT
Start: 2025-06-28

## 2025-06-30 ENCOUNTER — TELEPHONE (OUTPATIENT)
Dept: PRIMARY CARE | Facility: CLINIC | Age: 75
End: 2025-06-30
Payer: MEDICARE

## 2025-06-30 DIAGNOSIS — E78.5 DYSLIPIDEMIA: ICD-10-CM

## 2025-06-30 DIAGNOSIS — I10 PRIMARY HYPERTENSION: ICD-10-CM

## 2025-06-30 PROCEDURE — RXMED WILLOW AMBULATORY MEDICATION CHARGE

## 2025-06-30 RX ORDER — ATORVASTATIN CALCIUM 40 MG/1
40 TABLET, FILM COATED ORAL NIGHTLY
Qty: 90 TABLET | Refills: 3 | Status: SHIPPED | OUTPATIENT
Start: 2025-06-30 | End: 2025-07-01 | Stop reason: SDUPTHER

## 2025-06-30 RX ORDER — LISINOPRIL 10 MG/1
10 TABLET ORAL DAILY
Qty: 90 TABLET | Refills: 3 | Status: SHIPPED | OUTPATIENT
Start: 2025-06-30 | End: 2025-07-01 | Stop reason: SDUPTHER

## 2025-07-01 DIAGNOSIS — E78.5 DYSLIPIDEMIA: ICD-10-CM

## 2025-07-01 DIAGNOSIS — I10 PRIMARY HYPERTENSION: ICD-10-CM

## 2025-07-01 DIAGNOSIS — F41.9 ANXIETY AND DEPRESSION: ICD-10-CM

## 2025-07-01 DIAGNOSIS — F32.A ANXIETY AND DEPRESSION: ICD-10-CM

## 2025-07-01 RX ORDER — ATORVASTATIN CALCIUM 40 MG/1
40 TABLET, FILM COATED ORAL NIGHTLY
Qty: 90 TABLET | Refills: 3 | Status: SHIPPED | OUTPATIENT
Start: 2025-07-01 | End: 2026-07-01

## 2025-07-01 RX ORDER — ESCITALOPRAM OXALATE 10 MG/1
10 TABLET ORAL DAILY
Qty: 90 TABLET | Refills: 3 | Status: SHIPPED | OUTPATIENT
Start: 2025-07-01

## 2025-07-01 RX ORDER — LISINOPRIL 10 MG/1
10 TABLET ORAL DAILY
Qty: 90 TABLET | Refills: 3 | Status: SHIPPED | OUTPATIENT
Start: 2025-07-01 | End: 2026-07-01

## 2025-07-02 ENCOUNTER — PHARMACY VISIT (OUTPATIENT)
Dept: PHARMACY | Facility: CLINIC | Age: 75
End: 2025-07-02
Payer: COMMERCIAL

## 2025-07-11 ENCOUNTER — APPOINTMENT (OUTPATIENT)
Dept: LAB | Facility: HOSPITAL | Age: 75
End: 2025-07-11
Payer: MEDICARE

## 2025-07-11 ENCOUNTER — LAB (OUTPATIENT)
Dept: LAB | Facility: HOSPITAL | Age: 75
End: 2025-07-11
Payer: MEDICARE

## 2025-07-11 DIAGNOSIS — M81.0 AGE-RELATED OSTEOPOROSIS WITHOUT CURRENT PATHOLOGICAL FRACTURE: Primary | ICD-10-CM

## 2025-07-11 DIAGNOSIS — H91.13 PRESBYCUSIS, BILATERAL: ICD-10-CM

## 2025-07-11 DIAGNOSIS — D50.9 IRON DEFICIENCY ANEMIA, UNSPECIFIED: ICD-10-CM

## 2025-07-11 LAB
ALBUMIN SERPL BCP-MCNC: 4.2 G/DL (ref 3.4–5)
ALP SERPL-CCNC: 82 U/L (ref 33–136)
ALT SERPL W P-5'-P-CCNC: 20 U/L (ref 7–45)
ANION GAP SERPL CALC-SCNC: 12 MMOL/L (ref 10–20)
AST SERPL W P-5'-P-CCNC: 21 U/L (ref 9–39)
BASOPHILS # BLD AUTO: 0.03 X10*3/UL (ref 0–0.1)
BASOPHILS NFR BLD AUTO: 0.4 %
BILIRUB SERPL-MCNC: 0.4 MG/DL (ref 0–1.2)
BUN SERPL-MCNC: 24 MG/DL (ref 6–23)
CALCIUM SERPL-MCNC: 10 MG/DL (ref 8.6–10.3)
CHLORIDE SERPL-SCNC: 104 MMOL/L (ref 98–107)
CO2 SERPL-SCNC: 28 MMOL/L (ref 21–32)
CREAT SERPL-MCNC: 1.25 MG/DL (ref 0.5–1.05)
EGFRCR SERPLBLD CKD-EPI 2021: 46 ML/MIN/1.73M*2
EOSINOPHIL # BLD AUTO: 0.22 X10*3/UL (ref 0–0.4)
EOSINOPHIL NFR BLD AUTO: 3 %
ERYTHROCYTE [DISTWIDTH] IN BLOOD BY AUTOMATED COUNT: 13.6 % (ref 11.5–14.5)
FERRITIN SERPL-MCNC: 166 NG/ML (ref 8–150)
GLUCOSE SERPL-MCNC: 127 MG/DL (ref 74–99)
HCT VFR BLD AUTO: 40.8 % (ref 36–46)
HGB BLD-MCNC: 13 G/DL (ref 12–16)
HOLD SPECIMEN: NORMAL
IMM GRANULOCYTES # BLD AUTO: 0.01 X10*3/UL (ref 0–0.5)
IMM GRANULOCYTES NFR BLD AUTO: 0.1 % (ref 0–0.9)
IRON SATN MFR SERPL: 25 % (ref 25–45)
IRON SERPL-MCNC: 88 UG/DL (ref 35–150)
LYMPHOCYTES # BLD AUTO: 1.44 X10*3/UL (ref 0.8–3)
LYMPHOCYTES NFR BLD AUTO: 19.4 %
MCH RBC QN AUTO: 28.6 PG (ref 26–34)
MCHC RBC AUTO-ENTMCNC: 31.9 G/DL (ref 32–36)
MCV RBC AUTO: 90 FL (ref 80–100)
MONOCYTES # BLD AUTO: 0.67 X10*3/UL (ref 0.05–0.8)
MONOCYTES NFR BLD AUTO: 9 %
NEUTROPHILS # BLD AUTO: 5.07 X10*3/UL (ref 1.6–5.5)
NEUTROPHILS NFR BLD AUTO: 68.1 %
NRBC BLD-RTO: 0 /100 WBCS (ref 0–0)
PLATELET # BLD AUTO: 212 X10*3/UL (ref 150–450)
POTASSIUM SERPL-SCNC: 4.3 MMOL/L (ref 3.5–5.3)
PROT SERPL-MCNC: 6.9 G/DL (ref 6.4–8.2)
RBC # BLD AUTO: 4.55 X10*6/UL (ref 4–5.2)
SODIUM SERPL-SCNC: 140 MMOL/L (ref 136–145)
TIBC SERPL-MCNC: 359 UG/DL (ref 240–445)
UIBC SERPL-MCNC: 271 UG/DL (ref 110–370)
WBC # BLD AUTO: 7.4 X10*3/UL (ref 4.4–11.3)

## 2025-07-11 PROCEDURE — 80053 COMPREHEN METABOLIC PANEL: CPT

## 2025-07-11 PROCEDURE — 83550 IRON BINDING TEST: CPT

## 2025-07-11 PROCEDURE — 36415 COLL VENOUS BLD VENIPUNCTURE: CPT

## 2025-07-11 PROCEDURE — 82728 ASSAY OF FERRITIN: CPT

## 2025-07-11 PROCEDURE — 83540 ASSAY OF IRON: CPT

## 2025-07-11 PROCEDURE — 85025 COMPLETE CBC W/AUTO DIFF WBC: CPT

## 2025-07-11 PROCEDURE — 82330 ASSAY OF CALCIUM: CPT

## 2025-07-12 LAB — CA-I BLD-SCNC: 1.28 MMOL/L (ref 1.1–1.33)

## 2025-07-14 LAB — HOLD SPECIMEN: NORMAL

## 2025-07-14 ASSESSMENT — ENCOUNTER SYMPTOMS
PALPITATIONS: 0
DIAPHORESIS: 0
FATIGUE: 1
BOWEL INCONTINENCE: 1
LIGHT-HEADEDNESS: 1
MEMORY LOSS: 1
HEADACHES: 0
CLUMSINESS: 1
CONFUSION: 1
SHORTNESS OF BREATH: 1
LOSS OF BALANCE: 1
AURA: 0
NEUROLOGIC COMPLAINT: 1
VERTIGO: 0
NAUSEA: 0
VOMITING: 0
FEVER: 0
ABDOMINAL PAIN: 0
NECK PAIN: 0

## 2025-07-16 ENCOUNTER — APPOINTMENT (OUTPATIENT)
Dept: HEMATOLOGY/ONCOLOGY | Facility: HOSPITAL | Age: 75
End: 2025-07-16
Payer: MEDICARE

## 2025-07-16 ENCOUNTER — OFFICE VISIT (OUTPATIENT)
Dept: HEMATOLOGY/ONCOLOGY | Facility: HOSPITAL | Age: 75
End: 2025-07-16
Payer: MEDICARE

## 2025-07-16 ENCOUNTER — INFUSION (OUTPATIENT)
Dept: HEMATOLOGY/ONCOLOGY | Facility: HOSPITAL | Age: 75
End: 2025-07-16
Payer: MEDICARE

## 2025-07-16 VITALS
SYSTOLIC BLOOD PRESSURE: 111 MMHG | TEMPERATURE: 95.4 F | BODY MASS INDEX: 37.65 KG/M2 | RESPIRATION RATE: 16 BRPM | HEART RATE: 53 BPM | OXYGEN SATURATION: 94 % | HEIGHT: 62 IN | WEIGHT: 204.59 LBS | DIASTOLIC BLOOD PRESSURE: 75 MMHG

## 2025-07-16 DIAGNOSIS — D50.9 IRON DEFICIENCY ANEMIA, UNSPECIFIED IRON DEFICIENCY ANEMIA TYPE: Primary | ICD-10-CM

## 2025-07-16 DIAGNOSIS — M81.0 AGE-RELATED OSTEOPOROSIS WITHOUT CURRENT PATHOLOGICAL FRACTURE: ICD-10-CM

## 2025-07-16 DIAGNOSIS — H91.13 PRESBYCUSIS OF BOTH EARS: ICD-10-CM

## 2025-07-16 PROCEDURE — 2500000004 HC RX 250 GENERAL PHARMACY W/ HCPCS (ALT 636 FOR OP/ED): Mod: JZ,TB | Performed by: PHYSICIAN ASSISTANT

## 2025-07-16 PROCEDURE — 3078F DIAST BP <80 MM HG: CPT | Performed by: INTERNAL MEDICINE

## 2025-07-16 PROCEDURE — 96372 THER/PROPH/DIAG INJ SC/IM: CPT

## 2025-07-16 PROCEDURE — 99214 OFFICE O/P EST MOD 30 MIN: CPT | Performed by: INTERNAL MEDICINE

## 2025-07-16 PROCEDURE — 3008F BODY MASS INDEX DOCD: CPT | Performed by: INTERNAL MEDICINE

## 2025-07-16 PROCEDURE — 1159F MED LIST DOCD IN RCRD: CPT | Performed by: INTERNAL MEDICINE

## 2025-07-16 PROCEDURE — 3074F SYST BP LT 130 MM HG: CPT | Performed by: INTERNAL MEDICINE

## 2025-07-16 PROCEDURE — 1126F AMNT PAIN NOTED NONE PRSNT: CPT | Performed by: INTERNAL MEDICINE

## 2025-07-16 RX ORDER — ALBUTEROL SULFATE 0.83 MG/ML
3 SOLUTION RESPIRATORY (INHALATION) AS NEEDED
OUTPATIENT
Start: 2026-01-08

## 2025-07-16 RX ORDER — FAMOTIDINE 10 MG/ML
20 INJECTION, SOLUTION INTRAVENOUS ONCE AS NEEDED
OUTPATIENT
Start: 2026-01-08

## 2025-07-16 RX ORDER — EPINEPHRINE 0.3 MG/.3ML
0.3 INJECTION SUBCUTANEOUS EVERY 5 MIN PRN
OUTPATIENT
Start: 2026-01-08

## 2025-07-16 RX ORDER — DIPHENHYDRAMINE HYDROCHLORIDE 50 MG/ML
50 INJECTION, SOLUTION INTRAMUSCULAR; INTRAVENOUS AS NEEDED
OUTPATIENT
Start: 2026-01-08

## 2025-07-16 RX ADMIN — DENOSUMAB 60 MG: 60 INJECTION SUBCUTANEOUS at 14:04

## 2025-07-16 ASSESSMENT — PATIENT HEALTH QUESTIONNAIRE - PHQ9
SUM OF ALL RESPONSES TO PHQ QUESTIONS 1-9: 0
SUM OF ALL RESPONSES TO PHQ QUESTIONS 1-9: 9
9. THOUGHTS THAT YOU WOULD BE BETTER OFF DEAD, OR OF HURTING YOURSELF: NOT AT ALL
3. TROUBLE FALLING OR STAYING ASLEEP OR SLEEPING TOO MUCH: NEARLY EVERY DAY
6. FEELING BAD ABOUT YOURSELF - OR THAT YOU ARE A FAILURE OR HAVE LET YOURSELF OR YOUR FAMILY DOWN: NOT AT ALL
2. FEELING DOWN, DEPRESSED OR HOPELESS: SEVERAL DAYS
5. POOR APPETITE OR OVEREATING: NOT AT ALL
2. FEELING DOWN, DEPRESSED OR HOPELESS: NOT AT ALL
10. IF YOU CHECKED OFF ANY PROBLEMS, HOW DIFFICULT HAVE THESE PROBLEMS MADE IT FOR YOU TO DO YOUR WORK, TAKE CARE OF THINGS AT HOME, OR GET ALONG WITH OTHER PEOPLE: NOT DIFFICULT AT ALL
4. FEELING TIRED OR HAVING LITTLE ENERGY: NOT AT ALL
SUM OF ALL RESPONSES TO PHQ9 QUESTIONS 1 & 2: 0
4. FEELING TIRED OR HAVING LITTLE ENERGY: SEVERAL DAYS
7. TROUBLE CONCENTRATING ON THINGS, SUCH AS READING THE NEWSPAPER OR WATCHING TELEVISION: NOT AT ALL
8. MOVING OR SPEAKING SO SLOWLY THAT OTHER PEOPLE COULD HAVE NOTICED. OR THE OPPOSITE, BEING SO FIGETY OR RESTLESS THAT YOU HAVE BEEN MOVING AROUND A LOT MORE THAN USUAL: NOT AT ALL
SUM OF ALL RESPONSES TO PHQ9 QUESTIONS 1 AND 2: 2
6. FEELING BAD ABOUT YOURSELF - OR THAT YOU ARE A FAILURE OR HAVE LET YOURSELF OR YOUR FAMILY DOWN: SEVERAL DAYS
1. LITTLE INTEREST OR PLEASURE IN DOING THINGS: NOT AT ALL
9. THOUGHTS THAT YOU WOULD BE BETTER OFF DEAD, OR OF HURTING YOURSELF: NOT AT ALL
3. TROUBLE FALLING OR STAYING ASLEEP: NOT AT ALL
7. TROUBLE CONCENTRATING ON THINGS, SUCH AS READING THE NEWSPAPER OR WATCHING TELEVISION: NOT AT ALL
8. MOVING OR SPEAKING SO SLOWLY THAT OTHER PEOPLE COULD HAVE NOTICED. OR THE OPPOSITE, BEING SO FIGETY OR RESTLESS THAT YOU HAVE BEEN MOVING AROUND A LOT MORE THAN USUAL: NOT AT ALL
5. POOR APPETITE OR OVEREATING: MORE THAN HALF THE DAYS
1. LITTLE INTEREST OR PLEASURE IN DOING THINGS: SEVERAL DAYS

## 2025-07-16 ASSESSMENT — ENCOUNTER SYMPTOMS
LOSS OF SENSATION IN FEET: 0
OCCASIONAL FEELINGS OF UNSTEADINESS: 1
CARDIOVASCULAR NEGATIVE: 1
GASTROINTESTINAL NEGATIVE: 1
CONSTITUTIONAL NEGATIVE: 1
DEPRESSION: 0
DEPRESSION: 1
LOSS OF SENSATION IN FEET: 0
OCCASIONAL FEELINGS OF UNSTEADINESS: 0

## 2025-07-16 ASSESSMENT — PAIN SCALES - GENERAL: PAINLEVEL_OUTOF10: 0-NO PAIN

## 2025-07-16 NOTE — PROGRESS NOTES
"Patient ID: Soni Cleveland is a 75 y.o. female.    Subjective:  Returns for follow up for pulmonary emboli and osteoporosis.   No new complaints.\ Denies blood in urine or stool. Denies dyspnea or chest pain.     Assessment/Plan:  ? Pulmoanry emboli: B/L in Jul 2021. Unprovoked. Needs lifelong anti-coagulation. Continue Eliquis at 2.5 mg po bid. Pt concurs.     ? Anemia: Was mild with macrocytosis. But now resolved. Last Hb is 12.7 No need for work-up.     We could discharge her from our clinic but she received Prolia every 6 months for osteopenia => We will keep seeing her annually.     Review Of Systems:  Review of Systems   Constitutional: Negative.    HENT:  Negative.     Cardiovascular: Negative.    Gastrointestinal: Negative.        Physical Exam:  /75 (BP Location: Left arm, Patient Position: Sitting, BP Cuff Size: Adult)   Pulse 53   Temp 35.2 °C (95.4 °F) (Temporal)   Resp 16   Ht 1.562 m (5' 1.5\")   Wt 92.8 kg (204 lb 9.4 oz)   SpO2 94%   BMI 38.03 kg/m²   BSA: 2.01 meters squared  Performance Status: Symptomatic; fully ambulatory  Physical Exam  Constitutional:       Appearance: Normal appearance.   HENT:      Head: Normocephalic.     Cardiovascular:      Rate and Rhythm: Normal rate and regular rhythm.   Pulmonary:      Effort: Pulmonary effort is normal.   Abdominal:      General: Abdomen is flat.     Skin:     Coloration: Skin is not jaundiced.     Neurological:      Mental Status: She is alert.         Results:  Diagnostic Results   Lab Results   Component Value Date    WBC 7.4 07/11/2025    HGB 13.0 07/11/2025    HCT 40.8 07/11/2025    MCV 90 07/11/2025     07/11/2025     Lab Results   Component Value Date    CALCIUM 10.0 07/11/2025     07/11/2025    K 4.3 07/11/2025    CO2 28 07/11/2025     07/11/2025    BUN 24 (H) 07/11/2025    CREATININE 1.25 (H) 07/11/2025    ALT 20 07/11/2025    AST 21 07/11/2025       Current Outpatient Medications:     acetaminophen (Tylenol) " 325 mg tablet, Take 2 tablets (650 mg) by mouth every 6 hours if needed., Disp: , Rfl:     apixaban (Eliquis) 2.5 mg tablet, Take 1 tablet (2.5 mg) by mouth 2 times a day., Disp: 60 tablet, Rfl: 11    artificial tears, dextran-hypomel-glycerin, 0.1-0.3-0.2 % ophthalmic solution, Administer into affected eye(s)., Disp: , Rfl:     atorvastatin (Lipitor) 40 mg tablet, Take 1 tablet (40 mg) by mouth once daily at bedtime., Disp: 90 tablet, Rfl: 3    bisoprolol (Zebeta) 5 mg tablet, TAKE 1/2 TABLET BY MOUTH DAILY, Disp: 45 tablet, Rfl: 3    cholecalciferol (Vitamin D-3) 25 MCG (1000 UT) tablet, Take 2 tablets (2,000 Units) by mouth once daily., Disp: , Rfl:     denosumab (Prolia) 60 mg/mL syringe, Prolia, Disp: , Rfl:     diclofenac sodium (Voltaren Arthritis Pain) 1 % gel, Apply 4.5 inches (4 g) topically 4 times a day as needed (pain)., Disp: 100 g, Rfl: 0    escitalopram (Lexapro) 10 mg tablet, Take 1 tablet (10 mg) by mouth once daily., Disp: 90 tablet, Rfl: 3    HAIR, SKIN AND NAILS, BIOTIN, ORAL, Take by mouth once daily., Disp: , Rfl:     hydrOXYzine HCL (Atarax) 10 mg tablet, Take 1 tablet (10 mg) by mouth every 8 hours if needed for itching., Disp: 30 tablet, Rfl: 0    ketoconazole (NIZOral) 2 % cream, , Disp: , Rfl:     lidocaine (Lidoderm) 5 % patch, Place 1 patch over 12 hours on the skin once daily. Remove & discard patch within 12 hours or as directed by MD., Disp: 12 patch, Rfl: 0    lisinopril 10 mg tablet, Take 1 tablet (10 mg) by mouth once daily., Disp: 90 tablet, Rfl: 3    minoxidil (Rogaine) 2 % external solution, Apply topically 2 times a day., Disp: , Rfl:     nitroglycerin (Nitrostat) 0.4 mg SL tablet, Place 1 tablet (0.4 mg) under the tongue every 5 minutes if needed for chest pain. DISSOLVE 1 TABLET UNDER THE TONGUE EVERY 5 MINUTES FOR UP TO 3 DOSES AS NEEDED FOR CHEST PAIN.CALL 911 IF PAIN PERSISTS., Disp: 90 tablet, Rfl: 1    triamcinolone (Kenalog) 0.1 % cream, Apply topically 2 times a day.  Apply to affected area 1-2 times daily as needed. Avoid face and groin., Disp: 30 g, Rfl: 0    sod sulf-pot chloride-mag sulf 1.479-0.188- 0.225 gram tablet, Take 12 tablets by mouth the day before and 12 tablets by mouth the day of colonoscopy per included instructions. Finish prep per instructions. (Patient not taking: Reported on 7/16/2025), Disp: 24 tablet, Rfl: 0  No current facility-administered medications for this visit.    Facility-Administered Medications Ordered in Other Visits:     denosumab (Prolia) injection 60 mg, 60 mg, subcutaneous, Once, Vanessa Dsouza PA-C     Past Surgical History:   Procedure Laterality Date    APPENDECTOMY  03/19/2015    Appendectomy    CARDIAC CATHETERIZATION  December 2018    HYSTERECTOMY  03/19/2015    Hysterectomy    MR HEAD ANGIO WO IV CONTRAST  02/21/2012    MR HEAD ANGIO WO IV CONTRAST [facility]    MR NECK ANGIO WO IV CONTRAST  02/21/2012    MR NECK ANGIO WO IV CONTRAST [facility]    TONSILLECTOMY  03/19/2015    Tonsillectomy     Family History   Problem Relation Name Age of Onset    Cirrhosis Mother          NIEVES    Stroke Father Johan         CVA    Heart failure Father Johan         Congestive    Gout Father Johan     Heart attack Father Johan         Myocardial infarction    Heart disease Father Johan     Colon cancer Sister Miguel (colon cancer)     Cancer Sister Miguel (colon cancer)     Multiple sclerosis Sister Danelle     COPD Brother Jayesh     Asthma Brother Jayesh     Multiple sclerosis Sister [place]       reports that she quit smoking about 45 years ago. Her smoking use included cigarettes. She started smoking about 55 years ago. She has a 10 pack-year smoking history. She has never used smokeless tobacco.    Diagnoses and all orders for this visit:  Iron deficiency anemia, unspecified iron deficiency anemia type  -     Clinic Appointment Request  Presbycusis of both ears  -     Clinic Appointment Request       Miles Lubin  MD

## 2025-07-17 ENCOUNTER — APPOINTMENT (OUTPATIENT)
Dept: HEMATOLOGY/ONCOLOGY | Facility: HOSPITAL | Age: 75
End: 2025-07-17
Payer: MEDICARE

## 2025-07-21 ENCOUNTER — APPOINTMENT (OUTPATIENT)
Dept: PRIMARY CARE | Facility: CLINIC | Age: 75
End: 2025-07-21
Payer: MEDICARE

## 2025-07-21 VITALS
WEIGHT: 204.4 LBS | HEART RATE: 65 BPM | HEIGHT: 62 IN | SYSTOLIC BLOOD PRESSURE: 111 MMHG | BODY MASS INDEX: 37.61 KG/M2 | TEMPERATURE: 97.2 F | OXYGEN SATURATION: 95 % | DIASTOLIC BLOOD PRESSURE: 64 MMHG

## 2025-07-21 DIAGNOSIS — E78.5 BORDERLINE HYPERLIPIDEMIA: ICD-10-CM

## 2025-07-21 DIAGNOSIS — E55.9 VITAMIN D INSUFFICIENCY: ICD-10-CM

## 2025-07-21 DIAGNOSIS — Z12.31 SCREENING MAMMOGRAM FOR BREAST CANCER: Primary | ICD-10-CM

## 2025-07-21 PROBLEM — E66.01 CLASS 2 SEVERE OBESITY DUE TO EXCESS CALORIES WITH SERIOUS COMORBIDITY AND BODY MASS INDEX (BMI) OF 39.0 TO 39.9 IN ADULT: Status: RESOLVED | Noted: 2023-01-25 | Resolved: 2025-07-21

## 2025-07-21 PROBLEM — E66.812 CLASS 2 SEVERE OBESITY DUE TO EXCESS CALORIES WITH SERIOUS COMORBIDITY AND BODY MASS INDEX (BMI) OF 39.0 TO 39.9 IN ADULT: Status: RESOLVED | Noted: 2023-01-25 | Resolved: 2025-07-21

## 2025-07-21 PROCEDURE — 3074F SYST BP LT 130 MM HG: CPT | Performed by: PHYSICIAN ASSISTANT

## 2025-07-21 PROCEDURE — 1160F RVW MEDS BY RX/DR IN RCRD: CPT | Performed by: PHYSICIAN ASSISTANT

## 2025-07-21 PROCEDURE — 1159F MED LIST DOCD IN RCRD: CPT | Performed by: PHYSICIAN ASSISTANT

## 2025-07-21 PROCEDURE — 3078F DIAST BP <80 MM HG: CPT | Performed by: PHYSICIAN ASSISTANT

## 2025-07-21 PROCEDURE — 99214 OFFICE O/P EST MOD 30 MIN: CPT | Performed by: PHYSICIAN ASSISTANT

## 2025-07-21 PROCEDURE — 3008F BODY MASS INDEX DOCD: CPT | Performed by: PHYSICIAN ASSISTANT

## 2025-07-21 ASSESSMENT — PATIENT HEALTH QUESTIONNAIRE - PHQ9
1. LITTLE INTEREST OR PLEASURE IN DOING THINGS: SEVERAL DAYS
SUM OF ALL RESPONSES TO PHQ9 QUESTIONS 1 AND 2: 1
2. FEELING DOWN, DEPRESSED OR HOPELESS: NOT AT ALL

## 2025-07-21 NOTE — PROGRESS NOTES
Answers submitted by the patient for this visit:  Neurological Problem Questionnaire (Submitted on 7/14/2025)  Chief Complaint: Neurologic complaint  clumsiness: Yes  loss of balance: Yes  memory loss: Yes  Chronicity: recurrent  Onset: more than 1 year ago  Onset quality: gradually  Progression since onset: gradually worsening  Focality: no focality noted  abdominal pain: No  aura: No  bladder incontinence: Yes  bowel incontinence: Yes  confusion: Yes  fatigue: Yes  vertigo: No  fever: No  headaches: No  auditory change: Yes  light-headedness: Yes  nausea: No  neck pain: No  palpitations: No  shortness of breath: Yes  diaphoresis: No  vomiting: No  Treatments tried: sleep  Improvement on treatment: moderate  Subjective     HPI   Soni Cleveland is a 75 y.o. year old female patient with presenting to clinic with concern for   Chief Complaint   Patient presents with    Follow-up     Double check vaccinations, refills,         Feet and hands burning, itching, concern for neuropathy. Not interested in gabapentin d/t memory concerns. Hands are worse, recommended discussion with hand specialist for possible carpal tunnel vs cubital tunnel?, seems worse in morning    Forgetfulness for names. During conversation, interrupts at times so that she doesn't forget what she wants to say.   MiniCog today    Noticing floaters. Difficulty deciphering some images at times.         HTN  -bisoprolol  -lisinopril 10  BP Readings from Last 5 Encounters:   07/21/25 111/64   07/16/25 111/75   03/20/25 122/68   01/14/25 122/72   12/17/24 118/68     Pulse Readings from Last 5 Encounters:   07/21/25 65   07/16/25 53   03/20/25 69   01/14/25 58   10/08/24 60       HLD  -atorvastatin 40  Lab Results   Component Value Date    LDLCALC 88 06/01/2024       ASCVD  -NTG SL PRN     Depression & Anxiety  -lexapro     Osteoporosis  -prolia  -Calcium  -Vit D  DEXA due 3/2025 ordered     Ataxia  Hasn't felt off balance, but has noted gait change in the  "past few years. Has been more careful of foot placement. I recommend physical therapy, done.        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      CHUCK  Noncompliant     Anxious & depression  -Lexapro  -start hydroxyzine          Problem List[1]    Medical History[2]   Surgical History[3]   Family History[4]   Social History     Tobacco Use    Smoking status: Former     Current packs/day: 0.00     Average packs/day: 1 pack/day for 10.0 years (10.0 ttl pk-yrs)     Types: Cigarettes     Start date: 1970     Quit date: 1980     Years since quittin.1    Smokeless tobacco: Never    Tobacco comments:     Was a smoker for about 10 yrs   Substance Use Topics    Alcohol use: Not Currently      Current Medications[5]     Review of Systems  Constitutional: Denies fever  HEENT: Denies ST, earache  CVS: Denies Chest pain  Pulmonary: Denies wheezing, SOB  GI: Denies N/V  : Denies dysuria  Musculoskeletal:  Denies myalgia  Neuro: Denies focal weakness or numbness.  Skin: Denies Rashes.  *Review of Systems is negative unless otherwise mentioned in HPI or ROS above.    Objective   /64   Pulse 65   Temp 36.2 °C (97.2 °F)   Ht 1.562 m (5' 1.5\")   Wt 92.7 kg (204 lb 6.4 oz)   SpO2 95%   BMI 38.00 kg/m²  reviewed Body mass index is 38 kg/m².     Physical Exam  Constitutional: NAD.  Resting comfortably.  Head: Atraumatic, normocephalic.  ENT: Moist oral mucosa. Nasal mucosa wnl.   Cardiac: Regular rate & rhythm.   Pulmonary: Lungs clear bilat  GI: Soft, Nontender, nondistended.   Musculoskeletal: No peripheral edema.   Skin: No evidence of trauma. No rashes  Psych: Intact judgement and insight.    See optho for vision concerns.   Perfect score on minicog.    .Assessment/Plan   Problem List Items Addressed This Visit    None  Visit Diagnoses         Codes      Screening mammogram for breast cancer    -  Primary Z12.31    Relevant Orders    BI mammo bilateral screening " tomosynthesis      Borderline hyperlipidemia     E78.5    Relevant Orders    TSH with reflex to Free T4 if abnormal    Lipid Panel      Vitamin D insufficiency     E55.9    Relevant Orders    Vitamin D 25-Hydroxy,Total (for eval of Vitamin D levels)                 [1]   Patient Active Problem List  Diagnosis    Abnormal EKG    Abnormally compliant left middle ear system with type AD tympanogram curve    Adrenal nodule    Agatston coronary artery calcium score less than 100    Age-related nuclear cataract, bilateral    Allergic reaction    Angina pectoris    Asymmetric SNHL (sensorineural hearing loss)    Primary hypertension    Chronic fatigue    Depression with anxiety    Dry eyes    Dyslipidemia    Dysphagia    Gait abnormality    Heart palpitations    Mild intermittent asthma without complication ([facility])    Neck pain    Obstructive sleep apnea    Posterior vitreous detachment of both eyes    Presbyacusis    Renal cyst    Restless legs syndrome    Stage 3b chronic kidney disease (Multi)    Stress incontinence in female    Tinnitus of both ears    Tubular adenoma of colon    Vitamin D deficiency    Vitreous floaters    Decreased hearing    Chronic left-sided low back pain with left-sided sciatica    Mild ascending aorta dilatation    Age-related osteoporosis without current pathological fracture    Gastrointestinal hemorrhage    Bright red rectal bleeding    Shortness of breath    Adrenal mass 1 cm to 4 cm in diameter    Vertigo    Posterior vitreous detachment    Osteopenia    Nuclear senile cataract    Migraine    Metabolic syndrome    Memory impairment    History of pulmonary embolism    Gastrointestinal hemorrhage with melena    Elevated cholesterol    Diastolic heart failure    Deep vein thrombosis (DVT) (Multi)    Cough    Nonscarring hair loss, unspecified    Alopecia    Adenoma of right adrenal gland    Pulmonary hypertension (Multi)    Polyneuropathy, unspecified    Asymptomatic postmenopausal state     Cervical dysphagia    Cystic kidney disease, unspecified    Deficiency of other specified B group vitamins    Iron deficiency anemia, unspecified    Polyp of colon    Disorder of phosphorus metabolism, unspecified    Hypertensive heart and chronic kidney disease with heart failure and stage 1 through stage 4 chronic kidney disease, or unspecified chronic kidney disease    Fatty (change of) liver, not elsewhere classified    H/O high risk medication treatment    Ataxia   [2]   Past Medical History:  Diagnosis Date    Acute pulmonary embolism (Multi) 12/15/2023    Acute pulmonary embolism without acute cor pulmonale (Multi) 12/15/2023    Anxiety     Asymmetrical sensorineural hearing loss 03/09/2018    Asymmetrical left sensorineural hearing loss    Chronic deep vein thrombosis (DVT) of lower extremity 01/25/2023    Chronic fatigue 03/24/2021    History of chronic fatigue    Chronic kidney disease     Chronic kidney disease, stage 3a (Multi) 02/19/2021    now Stage 3B CKD  Aug 2023    Clotting disorder (Multi) 5/07/2019    Depression     Fall 01/25/2023    Subsequent encounter    GI bleeding 01/25/2023    Gross hematuria 01/25/2023    Hyperlipidemia     Hypertension     Impacted cerumen, right ear 02/27/2018    Impacted cerumen of right ear    Pulmonary embolism     Sleep apnea     No CPAP    Tinnitus, bilateral 08/18/2020    Tinnitus, bilateral    Urinary tract infection 07/08/2021    Visual impairment    [3]   Past Surgical History:  Procedure Laterality Date    APPENDECTOMY  Sept 2000    Appendectomy    CARDIAC CATHETERIZATION  December 2018    HYSTERECTOMY  09/18/2000    Hysterectomy    MR HEAD ANGIO WO IV CONTRAST  02/21/2012    MR HEAD ANGIO WO IV CONTRAST [facility]    MR NECK ANGIO WO IV CONTRAST  02/21/2012    MR NECK ANGIO WO IV CONTRAST [facility]    TONSILLECTOMY  1958    Tonsillectomy   [4]   Family History  Problem Relation Name Age of Onset    Cirrhosis Mother          NIEVES    Stroke  Father Johan         CVA    Heart failure Father Johan         Congestive    Gout Father Johan     Heart attack Father Johan         Myocardial infarction    Heart disease Father Johan     Colon cancer Sister Miguel (colon cancer)     Cancer Sister Miguel (colon cancer)     Multiple sclerosis Sister Danelle     COPD Brother Jayesh     Asthma Brother Jayesh     Multiple sclerosis Sister [place]    [5]   Current Outpatient Medications:     acetaminophen (Tylenol) 325 mg tablet, Take 2 tablets (650 mg) by mouth every 6 hours if needed., Disp: , Rfl:     apixaban (Eliquis) 2.5 mg tablet, Take 1 tablet (2.5 mg) by mouth 2 times a day., Disp: 60 tablet, Rfl: 11    artificial tears, dextran-hypomel-glycerin, 0.1-0.3-0.2 % ophthalmic solution, Administer into affected eye(s)., Disp: , Rfl:     atorvastatin (Lipitor) 40 mg tablet, Take 1 tablet (40 mg) by mouth once daily at bedtime., Disp: 90 tablet, Rfl: 3    bisoprolol (Zebeta) 5 mg tablet, TAKE 1/2 TABLET BY MOUTH DAILY, Disp: 45 tablet, Rfl: 3    cholecalciferol (Vitamin D-3) 25 MCG (1000 UT) tablet, Take 2 tablets (2,000 Units) by mouth once daily., Disp: , Rfl:     denosumab (Prolia) 60 mg/mL syringe, Prolia, Disp: , Rfl:     diclofenac sodium (Voltaren Arthritis Pain) 1 % gel, Apply 4.5 inches (4 g) topically 4 times a day as needed (pain)., Disp: 100 g, Rfl: 0    escitalopram (Lexapro) 10 mg tablet, Take 1 tablet (10 mg) by mouth once daily., Disp: 90 tablet, Rfl: 3    HAIR, SKIN AND NAILS, BIOTIN, ORAL, Take by mouth once daily., Disp: , Rfl:     hydrOXYzine HCL (Atarax) 10 mg tablet, Take 1 tablet (10 mg) by mouth every 8 hours if needed for itching., Disp: 30 tablet, Rfl: 0    ketoconazole (NIZOral) 2 % cream, , Disp: , Rfl:     lidocaine (Lidoderm) 5 % patch, Place 1 patch over 12 hours on the skin once daily. Remove & discard patch within 12 hours or as directed by MD., Disp: 12 patch, Rfl: 0    lisinopril 10 mg tablet, Take 1 tablet (10 mg) by mouth once  daily., Disp: 90 tablet, Rfl: 3    minoxidil (Rogaine) 2 % external solution, Apply topically 2 times a day., Disp: , Rfl:     nitroglycerin (Nitrostat) 0.4 mg SL tablet, Place 1 tablet (0.4 mg) under the tongue every 5 minutes if needed for chest pain. DISSOLVE 1 TABLET UNDER THE TONGUE EVERY 5 MINUTES FOR UP TO 3 DOSES AS NEEDED FOR CHEST PAIN.CALL 911 IF PAIN PERSISTS., Disp: 90 tablet, Rfl: 1    sod sulf-pot chloride-mag sulf 1.479-0.188- 0.225 gram tablet, Take 12 tablets by mouth the day before and 12 tablets by mouth the day of colonoscopy per included instructions. Finish prep per instructions., Disp: 24 tablet, Rfl: 0    triamcinolone (Kenalog) 0.1 % cream, Apply topically 2 times a day. Apply to affected area 1-2 times daily as needed. Avoid face and groin., Disp: 30 g, Rfl: 0

## 2025-07-28 PROCEDURE — RXMED WILLOW AMBULATORY MEDICATION CHARGE

## 2025-07-30 ENCOUNTER — PHARMACY VISIT (OUTPATIENT)
Dept: PHARMACY | Facility: CLINIC | Age: 75
End: 2025-07-30
Payer: COMMERCIAL

## 2025-08-08 ENCOUNTER — APPOINTMENT (OUTPATIENT)
Dept: CARDIOLOGY | Facility: CLINIC | Age: 75
End: 2025-08-08
Payer: MEDICARE

## 2025-08-08 DIAGNOSIS — I10 PRIMARY HYPERTENSION: ICD-10-CM

## 2025-08-08 RX ORDER — BISOPROLOL FUMARATE 5 MG/1
2.5 TABLET, FILM COATED ORAL DAILY
Qty: 45 TABLET | Refills: 3 | Status: SHIPPED | OUTPATIENT
Start: 2025-08-08

## 2025-08-11 DIAGNOSIS — I10 PRIMARY HYPERTENSION: ICD-10-CM

## 2025-08-11 DIAGNOSIS — E78.5 DYSLIPIDEMIA: ICD-10-CM

## 2025-08-11 RX ORDER — ATORVASTATIN CALCIUM 40 MG/1
40 TABLET, FILM COATED ORAL NIGHTLY
Qty: 90 TABLET | Refills: 3 | Status: SHIPPED | OUTPATIENT
Start: 2025-08-11 | End: 2026-08-11

## 2025-08-11 RX ORDER — LISINOPRIL 10 MG/1
10 TABLET ORAL DAILY
Qty: 90 TABLET | Refills: 3 | Status: SHIPPED | OUTPATIENT
Start: 2025-08-11 | End: 2026-08-11

## 2025-08-11 RX ORDER — BISOPROLOL FUMARATE 5 MG/1
2.5 TABLET, FILM COATED ORAL DAILY
Qty: 45 TABLET | Refills: 3 | Status: SHIPPED | OUTPATIENT
Start: 2025-08-11

## 2025-08-20 DIAGNOSIS — F41.9 ANXIETY AND DEPRESSION: ICD-10-CM

## 2025-08-20 DIAGNOSIS — F32.A ANXIETY AND DEPRESSION: ICD-10-CM

## 2025-08-20 RX ORDER — ESCITALOPRAM OXALATE 10 MG/1
10 TABLET ORAL DAILY
Qty: 90 TABLET | Refills: 3 | Status: SHIPPED | OUTPATIENT
Start: 2025-08-20

## 2025-08-29 PROCEDURE — RXMED WILLOW AMBULATORY MEDICATION CHARGE

## 2025-09-02 ENCOUNTER — PHARMACY VISIT (OUTPATIENT)
Dept: PHARMACY | Facility: CLINIC | Age: 75
End: 2025-09-02
Payer: COMMERCIAL

## 2025-10-15 ENCOUNTER — APPOINTMENT (OUTPATIENT)
Dept: CARDIOLOGY | Facility: CLINIC | Age: 75
End: 2025-10-15
Payer: MEDICARE

## 2025-10-24 ENCOUNTER — APPOINTMENT (OUTPATIENT)
Dept: RADIOLOGY | Facility: HOSPITAL | Age: 75
End: 2025-10-24
Payer: MEDICARE

## 2025-12-01 ENCOUNTER — APPOINTMENT (OUTPATIENT)
Dept: PRIMARY CARE | Facility: CLINIC | Age: 75
End: 2025-12-01
Payer: MEDICARE